# Patient Record
Sex: MALE | Race: BLACK OR AFRICAN AMERICAN | Employment: OTHER | URBAN - METROPOLITAN AREA
[De-identification: names, ages, dates, MRNs, and addresses within clinical notes are randomized per-mention and may not be internally consistent; named-entity substitution may affect disease eponyms.]

---

## 2017-01-05 ENCOUNTER — OFFICE VISIT (OUTPATIENT)
Dept: CARDIOLOGY CLINIC | Age: 62
End: 2017-01-05

## 2017-01-05 VITALS
SYSTOLIC BLOOD PRESSURE: 138 MMHG | HEART RATE: 68 BPM | BODY MASS INDEX: 30.34 KG/M2 | DIASTOLIC BLOOD PRESSURE: 92 MMHG | WEIGHT: 224 LBS | HEIGHT: 72 IN

## 2017-01-05 DIAGNOSIS — Z98.61 HISTORY OF PTCA: Primary | ICD-10-CM

## 2017-01-05 PROCEDURE — 99214 OFFICE O/P EST MOD 30 MIN: CPT | Performed by: INTERNAL MEDICINE

## 2017-01-05 RX ORDER — CLOPIDOGREL BISULFATE 75 MG/1
75 TABLET ORAL DAILY
COMMUNITY
End: 2017-08-07 | Stop reason: SDUPTHER

## 2017-01-05 RX ORDER — METOPROLOL SUCCINATE 25 MG/1
25 TABLET, EXTENDED RELEASE ORAL 2 TIMES DAILY
Status: ON HOLD | COMMUNITY
End: 2018-02-07 | Stop reason: CLARIF

## 2017-01-05 RX ORDER — ATORVASTATIN CALCIUM 80 MG/1
80 TABLET, FILM COATED ORAL DAILY
COMMUNITY
End: 2017-07-26 | Stop reason: SDUPTHER

## 2017-07-26 RX ORDER — ATORVASTATIN CALCIUM 80 MG/1
80 TABLET, FILM COATED ORAL DAILY
Qty: 90 TABLET | Refills: 3 | Status: SHIPPED | OUTPATIENT
Start: 2017-07-26

## 2017-07-26 RX ORDER — ATORVASTATIN CALCIUM 80 MG/1
80 TABLET, FILM COATED ORAL DAILY
Qty: 90 TABLET | Refills: 3 | Status: SHIPPED | OUTPATIENT
Start: 2017-07-26 | End: 2017-07-26 | Stop reason: SDUPTHER

## 2017-08-07 RX ORDER — CLOPIDOGREL BISULFATE 75 MG/1
75 TABLET ORAL DAILY
Qty: 30 TABLET | Refills: 3 | Status: SHIPPED | OUTPATIENT
Start: 2017-08-07 | End: 2018-01-30 | Stop reason: SDUPTHER

## 2017-10-12 ENCOUNTER — OFFICE VISIT (OUTPATIENT)
Dept: CARDIOLOGY CLINIC | Age: 62
End: 2017-10-12

## 2017-10-12 VITALS
HEIGHT: 72 IN | HEART RATE: 68 BPM | DIASTOLIC BLOOD PRESSURE: 80 MMHG | BODY MASS INDEX: 28.44 KG/M2 | SYSTOLIC BLOOD PRESSURE: 112 MMHG | WEIGHT: 210 LBS

## 2017-10-12 DIAGNOSIS — Z98.61 HISTORY OF PTCA: Primary | ICD-10-CM

## 2017-10-12 DIAGNOSIS — I25.119 CORONARY ARTERY DISEASE INVOLVING NATIVE CORONARY ARTERY OF NATIVE HEART WITH ANGINA PECTORIS (HCC): ICD-10-CM

## 2017-10-12 PROCEDURE — G8598 ASA/ANTIPLAT THER USED: HCPCS | Performed by: INTERNAL MEDICINE

## 2017-10-12 PROCEDURE — 99214 OFFICE O/P EST MOD 30 MIN: CPT | Performed by: INTERNAL MEDICINE

## 2017-10-12 PROCEDURE — 3017F COLORECTAL CA SCREEN DOC REV: CPT | Performed by: INTERNAL MEDICINE

## 2017-10-12 PROCEDURE — 1036F TOBACCO NON-USER: CPT | Performed by: INTERNAL MEDICINE

## 2017-10-12 PROCEDURE — G8484 FLU IMMUNIZE NO ADMIN: HCPCS | Performed by: INTERNAL MEDICINE

## 2017-10-12 PROCEDURE — G8427 DOCREV CUR MEDS BY ELIG CLIN: HCPCS | Performed by: INTERNAL MEDICINE

## 2017-10-12 PROCEDURE — G8419 CALC BMI OUT NRM PARAM NOF/U: HCPCS | Performed by: INTERNAL MEDICINE

## 2017-10-12 NOTE — PATIENT INSTRUCTIONS
Please remember to bring all medication bottles or a medication list with you to your appointment. If you have any questions, please call our office at [phone].

## 2017-10-12 NOTE — PROGRESS NOTES
allergies     H/O Doppler ultrasound 07/11/2016    carotid - normal    HTN (hypertension)     Hyperlipidemia     IBS (irritable bowel syndrome)     Mental disability     OCD (obsessive compulsive disorder)     S/P PTCA (percutaneous transluminal coronary angioplasty) 06/16/2016    Cx OM stent    Seizures ([address]      Past Surgical History:   Procedure Laterality Date    JOINT REPLACEMENT      hip replacement left    PTCA  06/16/2016    Cx OM stent     History reviewed. No pertinent family history. Social History   Substance Use Topics    Smoking status: Never Smoker    Smokeless tobacco: Never Used    Alcohol use No        Review of Systems:   All 14 systems reviewed, all are negative   Objective:      Physical Exam:  /80   Pulse 68   Ht 6' (1.829 m)   Wt 210 lb (95.3 kg)   BMI 28.48 kg/m²   Wt Readings from Last 3 Encounters:   10/12/17 210 lb (95.3 kg)   01/05/17 224 lb (101.6 kg)   10/14/16 215 lb (97.5 kg)     Body mass index is 28.48 kg/m². GENERAL - Alert, oriented, pleasant, in no apparent distress. Head unremarkable  Eyes  Not injected conjunctiva  ENT  normal mucosa  Neck - Supple. No jugular venous distention noted. No carotid bruits. Cardiovascular  Normal S1 and S2 without obvious murmur or gallop. Extremities - No cyanosis, clubbing, or significant edema. Pulmonary  No respiratory distress. No wheezes or rales. Pulses: Bilateral radial and pedal pulses normal  Abdomen  no tenderness  Musculoskeletal  normal strength  Neurologic    There are  no gross focal neurologic abnormalities.   Skin-  No rash  Affect; normal mood    DATA:  No results found for: CKTOTAL, CKMB, CKMBINDEX, TROPONINI  BNP:  No results found for: BNP  PT/INR:  No results found for: PTINR  Lab Results   Component Value Date    LABA1C 6.3 06/17/2016     Lab Results   Component Value Date    CHOL 181 04/04/2014    TRIG 103 04/04/2014    HDL 44 (L) 04/04/2014    LDLCALC 116 (H) 04/04/2014 LDLDIRECT 166 (H) 02/28/2011     Lab Results   Component Value Date    ALT 23 07/10/2016    AST 19 07/10/2016     TSH:  No results found for: TSH      QUALITY MEASURES:  CAD:  Yes   CHOL LOWERING:  Yes- if No Why  ANTIPLATELET:  Yes - if No why  BETA BLOCKER    yes,   IF  NO WHY  SMOKING HISTORY no COUNSELLED no  ATRIAL FIBRILLATIONno ANTICOAG: no    Assessment/ Plan:     Patient seen , interviewed and examined                 -     CORONARY ARTERY DISEASE: Patient  currently as per anginal classification has   No symptoms           - changes in  treatment:   no  All CAD tests available in records reviewed. -  LIPID MANAGEMENT:  Available lipid  lab data reviewed  and patient was given dietary advice. NCEP- ATP III guidelines reviewed with patient. -   Changes  in medicines made: No            -  Hypertension: Patients blood pressure is stable. Patient is advised about low sodium diet. Present medical regimen will not be changed.

## 2018-01-24 ENCOUNTER — HOSPITAL ENCOUNTER (OUTPATIENT)
Dept: GENERAL RADIOLOGY | Age: 63
Discharge: HOME | End: 2018-01-24
Attending: PSYCHIATRY & NEUROLOGY | Admitting: PSYCHIATRY & NEUROLOGY

## 2018-01-24 LAB
BACTERIA: ABNORMAL /HPF
BILIRUBIN URINE: NEGATIVE MG/DL
BLOOD, URINE: NEGATIVE
CLARITY: CLEAR
COLOR: ABNORMAL
GLUCOSE, URINE: NEGATIVE MG/DL
KETONES, URINE: NEGATIVE MG/DL
LEUKOCYTE ESTERASE, URINE: NEGATIVE
MUCUS: ABNORMAL HPF
NITRITE URINE, QUANTITATIVE: NEGATIVE
PH, URINE: 5 (ref 5–8)
PROTEIN UA: NEGATIVE MG/DL
RBC URINE: <1 /HPF (ref 0–3)
SPECIFIC GRAVITY UA: 1.01 (ref 1–1.03)
SQUAMOUS EPITHELIAL: <1 /HPF
TRICHOMONAS: ABNORMAL /HPF
UROBILINOGEN, URINE: NORMAL MG/DL (ref 0.2–1)
WBC UA: 1 /HPF (ref 0–2)

## 2018-01-26 LAB
CULTURE: NORMAL
REPORT STATUS: NORMAL
REQUEST PROBLEM: NORMAL
SPECIMEN: NORMAL
TOTAL COLONY COUNT: NORMAL

## 2018-01-31 RX ORDER — CLOPIDOGREL BISULFATE 75 MG/1
75 TABLET ORAL DAILY
Qty: 30 TABLET | Refills: 6 | Status: ON HOLD | OUTPATIENT
Start: 2018-01-31 | End: 2018-03-05 | Stop reason: HOSPADM

## 2018-02-04 PROBLEM — R27.0 ATAXIA: Status: ACTIVE | Noted: 2018-02-04

## 2018-02-06 PROBLEM — I69.90 LATE EFFECTS OF CVA (CEREBROVASCULAR ACCIDENT): Status: ACTIVE | Noted: 2018-02-06

## 2018-02-07 PROBLEM — K59.04 CHRONIC IDIOPATHIC CONSTIPATION: Status: ACTIVE | Noted: 2018-02-07

## 2018-02-07 PROBLEM — R26.9 GAIT DISTURBANCE: Status: ACTIVE | Noted: 2018-02-07

## 2018-02-07 PROBLEM — I69.90 LATE EFFECTS OF CVA (CEREBROVASCULAR ACCIDENT): Status: ACTIVE | Noted: 2018-02-07

## 2018-02-07 PROBLEM — G40.909 SEIZURE DISORDER (HCC): Status: ACTIVE | Noted: 2018-02-07

## 2018-02-20 PROBLEM — R47.1 DYSARTHRIA: Status: ACTIVE | Noted: 2018-02-20

## 2018-02-21 PROBLEM — G62.1 ALCOHOL-INDUCED POLYNEUROPATHY (HCC): Status: ACTIVE | Noted: 2018-02-21

## 2018-03-02 PROBLEM — R27.8 DYSMETRIA: Status: ACTIVE | Noted: 2018-03-02

## 2018-03-02 PROBLEM — E11.649 HYPOGLYCEMIA ASSOCIATED WITH DIABETES (HCC): Status: ACTIVE | Noted: 2018-03-02

## 2018-03-02 PROBLEM — G93.41 ACUTE METABOLIC ENCEPHALOPATHY: Status: ACTIVE | Noted: 2018-02-20

## 2018-07-17 ENCOUNTER — OFFICE VISIT (OUTPATIENT)
Dept: CARDIOLOGY CLINIC | Age: 63
End: 2018-07-17

## 2018-07-17 VITALS
WEIGHT: 215 LBS | HEIGHT: 72 IN | BODY MASS INDEX: 29.12 KG/M2 | HEART RATE: 60 BPM | SYSTOLIC BLOOD PRESSURE: 118 MMHG | DIASTOLIC BLOOD PRESSURE: 76 MMHG

## 2018-07-17 DIAGNOSIS — I10 ESSENTIAL HYPERTENSION: ICD-10-CM

## 2018-07-17 DIAGNOSIS — E78.5 DYSLIPIDEMIA: ICD-10-CM

## 2018-07-17 DIAGNOSIS — I25.110 CORONARY ARTERY DISEASE INVOLVING NATIVE CORONARY ARTERY OF NATIVE HEART WITH UNSTABLE ANGINA PECTORIS (HCC): Primary | ICD-10-CM

## 2018-07-17 PROCEDURE — 99213 OFFICE O/P EST LOW 20 MIN: CPT | Performed by: NURSE PRACTITIONER

## 2018-07-17 NOTE — PROGRESS NOTES
 PTCA  06/16/2016    Cx OM stent     Family History   Problem Relation Age of Onset   [facility] Cancer Mother     No Known Problems Father         strange to father    Cancer Sister      Social History   Substance Use Topics    Smoking status: Never Smoker    Smokeless tobacco: Never Used    Alcohol use No        Review of Systems:   · Constitutional: No Fever or Weight Loss   · Eyes: No Decreased Vision  · ENT: No Headaches, Hearing Loss or Vertigo  · Cardiovascular: as per note above   · Respiratory: No cough or wheezing and as per note above. · Gastrointestinal: No abdominal pain, appetite loss, blood in stools, constipation, diarrhea or heartburn  · Genitourinary: No dysuria, trouble voiding, or hematuria  · Musculoskeletal:  None  · Integumentary: No rash or pruritis  · Neurological: No TIA or stroke symptoms  · Psychiatric: No anxiety or depression  · Endocrine: No malaise, fatigue or temperature intolerance  · Hematologic/Lymphatic: No bleeding problems, blood clots or swollen lymph nodes  · Allergic/Immunologic: No nasal congestion or hives    Objective:       /76   Pulse 60   Ht 6' (1.829 m)   Wt 215 lb (97.5 kg)   BMI 29.16 kg/m²   Wt Readings from Last 3 Encounters:   07/17/18 215 lb (97.5 kg)   03/05/18 200 lb 14.4 oz (91.1 kg)   02/21/18 200 lb (90.7 kg)       General Appearance:  No distress, conversant  Constitutional:  Well developed, Well nourished, No acute distress, Non-toxic appearance. HENT:  Normocephalic, Atraumatic, Bilateral external ears normal, Oropharynx moist, No oral exudates, Nose normal. Neck- Normal range of motion, No tenderness, Supple, No stridor,no apical-carotid delay  Eyes:  PERRL, EOMI, Conjunctiva normal, No discharge. Respiratory:  Normal breath sounds, No respiratory distress, No wheezing, No chest tenderness. ,no use of accessory muscles, NO crackles  Cardiovascular: (PMI) apex non displaced,no lifts no thrills, S1 S2  No murmur   GI:  Bowel sounds normal,

## 2019-03-06 ENCOUNTER — HOSPITAL ENCOUNTER (OUTPATIENT)
Age: 64
Discharge: HOME | End: 2019-03-06
Payer: MEDICARE

## 2019-03-06 ENCOUNTER — HOSPITAL ENCOUNTER (OUTPATIENT)
Dept: GENERAL RADIOLOGY | Age: 64
Discharge: HOME | End: 2019-03-06
Payer: MEDICARE

## 2019-03-06 DIAGNOSIS — M25.511 RIGHT SHOULDER PAIN, UNSPECIFIED CHRONICITY: ICD-10-CM

## 2019-03-06 PROCEDURE — 73030 X-RAY EXAM OF SHOULDER: CPT

## 2019-03-21 ENCOUNTER — HOSPITAL ENCOUNTER (OUTPATIENT)
Age: 64
Setting detail: SPECIMEN
Discharge: HOME | End: 2019-03-21
Payer: MEDICARE

## 2019-03-21 LAB
ALBUMIN SERPL-MCNC: 4.8 GM/DL (ref 3.4–5)
ALP BLD-CCNC: 39 IU/L (ref 40–128)
ALT SERPL-CCNC: 51 U/L (ref 10–40)
ANION GAP SERPL CALCULATED.3IONS-SCNC: 11 MMOL/L (ref 4–16)
AST SERPL-CCNC: 33 IU/L (ref 15–37)
BILIRUB SERPL-MCNC: 0.4 MG/DL (ref 0–1)
BUN BLDV-MCNC: 10 MG/DL (ref 6–23)
CALCIUM SERPL-MCNC: 9.7 MG/DL (ref 8.3–10.6)
CHLORIDE BLD-SCNC: 99 MMOL/L (ref 99–110)
CHOLESTEROL: 110 MG/DL
CO2: 29 MMOL/L (ref 21–32)
CREAT SERPL-MCNC: 0.9 MG/DL (ref 0.9–1.3)
CREATININE URINE: 155.2 MG/DL (ref 39–259)
ESTIMATED AVERAGE GLUCOSE: 243 MG/DL
GFR AFRICAN AMERICAN: >60 ML/MIN/1.73M2
GFR NON-AFRICAN AMERICAN: >60 ML/MIN/1.73M2
GLUCOSE BLD-MCNC: 196 MG/DL (ref 70–99)
HBA1C MFR BLD: 10.1 % (ref 4.2–6.3)
HDLC SERPL-MCNC: 43 MG/DL
LDL CHOLESTEROL DIRECT: 60 MG/DL
MICROALBUMIN/CREAT 24H UR: 3.8 MG/DL
MICROALBUMIN/CREAT UR-RTO: 24.5 MG/G CREAT (ref 0–30)
POTASSIUM SERPL-SCNC: 4.9 MMOL/L (ref 3.5–5.1)
SODIUM BLD-SCNC: 139 MMOL/L (ref 135–145)
TOTAL PROTEIN: 6.5 GM/DL (ref 6.4–8.2)
TRIGL SERPL-MCNC: 111 MG/DL

## 2019-03-21 PROCEDURE — 83036 HEMOGLOBIN GLYCOSYLATED A1C: CPT

## 2019-03-21 PROCEDURE — 80053 COMPREHEN METABOLIC PANEL: CPT

## 2019-03-21 PROCEDURE — 80061 LIPID PANEL: CPT

## 2019-03-21 PROCEDURE — 82043 UR ALBUMIN QUANTITATIVE: CPT

## 2019-03-21 PROCEDURE — 83721 ASSAY OF BLOOD LIPOPROTEIN: CPT

## 2019-03-21 PROCEDURE — 82570 ASSAY OF URINE CREATININE: CPT

## 2019-05-09 ENCOUNTER — OFFICE VISIT (OUTPATIENT)
Dept: CARDIOLOGY CLINIC | Age: 64
End: 2019-05-09
Payer: MEDICARE

## 2019-05-09 VITALS
HEART RATE: 90 BPM | WEIGHT: 205.2 LBS | SYSTOLIC BLOOD PRESSURE: 124 MMHG | HEIGHT: 75 IN | DIASTOLIC BLOOD PRESSURE: 82 MMHG | BODY MASS INDEX: 25.51 KG/M2

## 2019-05-09 DIAGNOSIS — I10 ESSENTIAL HYPERTENSION: ICD-10-CM

## 2019-05-09 DIAGNOSIS — I25.110 CORONARY ARTERY DISEASE INVOLVING NATIVE CORONARY ARTERY OF NATIVE HEART WITH UNSTABLE ANGINA PECTORIS (HCC): Primary | ICD-10-CM

## 2019-05-09 DIAGNOSIS — E78.5 DYSLIPIDEMIA: ICD-10-CM

## 2019-05-09 PROCEDURE — 99213 OFFICE O/P EST LOW 20 MIN: CPT | Performed by: NURSE PRACTITIONER

## 2019-05-09 PROCEDURE — 3017F COLORECTAL CA SCREEN DOC REV: CPT | Performed by: NURSE PRACTITIONER

## 2019-05-09 PROCEDURE — G8419 CALC BMI OUT NRM PARAM NOF/U: HCPCS | Performed by: NURSE PRACTITIONER

## 2019-05-09 PROCEDURE — G8598 ASA/ANTIPLAT THER USED: HCPCS | Performed by: NURSE PRACTITIONER

## 2019-05-09 PROCEDURE — G8427 DOCREV CUR MEDS BY ELIG CLIN: HCPCS | Performed by: NURSE PRACTITIONER

## 2019-05-09 PROCEDURE — 1036F TOBACCO NON-USER: CPT | Performed by: NURSE PRACTITIONER

## 2019-05-09 RX ORDER — LACOSAMIDE 200 MG/1
200 TABLET ORAL
COMMUNITY
End: 2019-06-28

## 2019-05-09 RX ORDER — LOPERAMIDE HYDROCHLORIDE 2 MG/1
2 CAPSULE ORAL EVERY 6 HOURS PRN
Status: ON HOLD | COMMUNITY
End: 2020-02-25 | Stop reason: HOSPADM

## 2019-05-09 NOTE — PROGRESS NOTES
daily      atorvastatin (LIPITOR) 80 MG tablet Take 1 tablet by mouth daily 90 tablet 3    OLANZapine (ZYPREXA) 7.5 MG tablet Take 7.5 mg by mouth nightly      ranitidine (ZANTAC) 150 MG tablet Take 150 mg by mouth nightly      lacosamide (VIMPAT) 200 MG tablet Take 1 tablet by mouth 2 times daily for 60 days. 60 tablet 1     No current facility-administered medications for this visit. Allergies: Patient has no known allergies.   Past Medical History:   Diagnosis Date    Alcohol-induced polyneuropathy ([address] 2/21/2018    CAD (coronary artery disease)     Dementia     DM2 (diabetes mellitus, type 2) (HCC) unknown    Environmental allergies     H/O Doppler ultrasound 07/11/2016    carotid - normal    HTN (hypertension)     Hyperlipidemia     IBS (irritable bowel syndrome)     Mental disability     OCD (obsessive compulsive disorder)     S/P PTCA (percutaneous transluminal coronary angioplasty) 06/16/2016    Cx OM stent    Seizures (HCC)      Past Surgical History:   Procedure Laterality Date    CARDIAC SURGERY      JOINT REPLACEMENT      hip replacement left    PTCA  06/16/2016    Cx OM stent     Family History   Problem Relation Age of Onset    Cancer Mother     No Known Problems Father         strange to father    Cancer Sister      Social History     Tobacco Use    Smoking status: Never Smoker    Smokeless tobacco: Never Used   Substance Use Topics    Alcohol use: No        Review of Systems - History obtained from the patient  General: negative for - fatigue, malaise, night sweats, weight gain  Psychological: negative for - anxiety, depression, sleep disturbances  Ophthalmic: negative for - blurry vision, loss of vision  ENT: negative for - headaches, vertigo, visual changes  Hematological and Lymphatic: negative for - bleeding problems, blood clots, bruising, fatigue or pallor  Endocrine: negative for - malaise/lethargy, palpitations, unexpected weight changes  Respiratory: negative Component Value Date    CHOL 110 03/21/2019    TRIG 111 03/21/2019    HDL 43 03/21/2019    LDLCALC 116 (H) 04/04/2014    LDLDIRECT 60 03/21/2019     Lab Results   Component Value Date    ALT 51 (H) 03/21/2019    AST 33 03/21/2019     TSH:  No results found for: TSH      Assessment/ Plan:     CAD     Stable    continue with medications    Last stress test 7/8/2016 stent LAD    HTN     Controlled   To cont same medications   advised low salt diet    Last echo 2/6/2018 showed Left ventricular function is normal , EF is estimated at 50 %.  Mild concentric left ventricular hypertrophy.   Right ventricular systolic pressure of 19 mm Hg.   No significant valvular regurgitation noted.   No evidence of any pericardial effusion. Hyperlipidemia  Lipid panel reviewed  Patient is at goal   Patient is on a Statin      Patient seen, interviewed and examined. Testing was reviewed. Patient is encouraged to exercise even a brisk walk for 30 minutes at least 3 to 4 times a week. Lifestyle and risk factor modificatons discussed. Various goals are discussed and questions answered. Continue current medications. Appropriate prescriptions are addressed. Questions answered and patient verbalizes understanding. Call for any problems, questions, or concerns.     Pt is to follow up in 9 months for Cardiac management    Electronically signed by JOSE Hernandez CNP on 5/9/2019 at 9:55 AM

## 2019-05-20 ENCOUNTER — OFFICE VISIT (OUTPATIENT)
Dept: FAMILY MEDICINE CLINIC | Age: 64
End: 2019-05-20
Payer: MEDICARE

## 2019-05-20 ENCOUNTER — TELEPHONE (OUTPATIENT)
Dept: FAMILY MEDICINE CLINIC | Age: 64
End: 2019-05-20

## 2019-05-20 VITALS
WEIGHT: 201.2 LBS | HEART RATE: 90 BPM | DIASTOLIC BLOOD PRESSURE: 60 MMHG | OXYGEN SATURATION: 97 % | SYSTOLIC BLOOD PRESSURE: 102 MMHG | BODY MASS INDEX: 25.15 KG/M2

## 2019-05-20 DIAGNOSIS — H61.21 RIGHT EAR IMPACTED CERUMEN: Primary | ICD-10-CM

## 2019-05-20 PROCEDURE — G8419 CALC BMI OUT NRM PARAM NOF/U: HCPCS | Performed by: NURSE PRACTITIONER

## 2019-05-20 PROCEDURE — G8598 ASA/ANTIPLAT THER USED: HCPCS | Performed by: NURSE PRACTITIONER

## 2019-05-20 PROCEDURE — G8427 DOCREV CUR MEDS BY ELIG CLIN: HCPCS | Performed by: NURSE PRACTITIONER

## 2019-05-20 PROCEDURE — 3017F COLORECTAL CA SCREEN DOC REV: CPT | Performed by: NURSE PRACTITIONER

## 2019-05-20 PROCEDURE — 99212 OFFICE O/P EST SF 10 MIN: CPT | Performed by: NURSE PRACTITIONER

## 2019-05-20 PROCEDURE — 1036F TOBACCO NON-USER: CPT | Performed by: NURSE PRACTITIONER

## 2019-05-20 RX ORDER — IBUPROFEN 200 MG
200 TABLET ORAL EVERY 6 HOURS PRN
Status: ON HOLD | COMMUNITY
End: 2020-01-31 | Stop reason: HOSPADM

## 2019-05-20 ASSESSMENT — PATIENT HEALTH QUESTIONNAIRE - PHQ9: DEPRESSION UNABLE TO ASSESS: FUNCTIONAL CAPACITY MOTIVATION LIMITS ACCURACY

## 2019-05-20 ASSESSMENT — ENCOUNTER SYMPTOMS
SORE THROAT: 0
ABDOMINAL PAIN: 0
COUGH: 0
RHINORRHEA: 0
VOMITING: 0
DIARRHEA: 0

## 2019-05-20 NOTE — PROGRESS NOTES
Marc San   60 y.o.  male  [id number]      Chief Complaint   Patient presents with    Otalgia     Right one, patient states it also needs irrigated. Subjective:  64 y.o.male is here for a follow up. He has the following chronic/acute medical problems:  Patient Active Problem List   Diagnosis    Obsessive-compulsive disorder    IBS (irritable bowel syndrome)    Seizures ([address]    Mental disability    Uncontrolled type 2 diabetes mellitus with diabetic polyneuropathy, without long-term current use of insulin ([address]    Coronary artery disease involving native coronary artery of native heart with unstable angina pectoris ([address]    Essential hypertension    Dyslipidemia    Ataxia    Late effects of CVA (cerebrovascular accident)    Chronic idiopathic constipation    Gait disturbance    Late effects of CVA (cerebrovascular accident)    Seizure disorder ([address]    Acute metabolic encephalopathy    Alcohol-induced polyneuropathy ([address]    Peripheral polyneuropathy    Hypoglycemia associated with diabetes ([address]    Dysmetria       Otalgia    There is pain in the right ear. This is a new problem. The current episode started today. The problem occurs constantly. The problem has been unchanged. There has been no fever. The pain is moderate. Pertinent negatives include no abdominal pain, coughing, diarrhea, ear discharge, headaches, hearing loss, neck pain, rash, rhinorrhea, sore throat or vomiting. He has tried nothing for the symptoms. Review of Systems   Constitutional: Negative for appetite change, chills, fatigue and fever. HENT: Positive for ear pain. Negative for congestion, ear discharge, hearing loss, postnasal drip, rhinorrhea, sinus pressure, sinus pain, sneezing and sore throat. Respiratory: Negative for cough, chest tightness, shortness of breath and wheezing. Cardiovascular: Negative for chest pain and palpitations.    Gastrointestinal: Negative for abdominal pain, diarrhea, nausea family,surgical history reviewed today. Objective:  /60   Pulse 90   Wt 201 lb 3.2 oz (91.3 kg)   SpO2 97%   BMI 25.15 kg/m²   BP Readings from Last 3 Encounters:   05/20/19 102/60   05/09/19 124/82   07/17/18 118/76     Wt Readings from Last 3 Encounters:   05/20/19 201 lb 3.2 oz (91.3 kg)   05/09/19 205 lb 3.2 oz (93.1 kg)   07/17/18 215 lb (97.5 kg)         Physical Exam   Constitutional: He is oriented to person, place, and time. He appears well-developed and well-nourished. HENT:   Head: Normocephalic. Left Ear: Tympanic membrane, external ear and ear canal normal.   Cerumen impaction noted in left ear. Neck: Neck supple. Cardiovascular: Normal rate, regular rhythm and normal heart sounds. Pulmonary/Chest: Effort normal and breath sounds normal.   Neurological: He is alert and oriented to person, place, and time. Skin: Skin is warm and dry. Psychiatric: He has a normal mood and affect.  His behavior is normal.       Lab Results   Component Value Date    WBC 4.4 03/03/2018    HGB 15.5 03/03/2018    HCT 46.6 03/03/2018    MCV 91.9 03/03/2018     03/03/2018     Lab Results   Component Value Date     03/21/2019    K 4.9 03/21/2019    CL 99 03/21/2019    CO2 29 03/21/2019    BUN 10 03/21/2019    CREATININE 0.9 03/21/2019    GLUCOSE 196 (H) 03/21/2019    CALCIUM 9.7 03/21/2019    PROT 6.5 03/21/2019    LABALBU 4.8 03/21/2019    BILITOT 0.4 03/21/2019    ALKPHOS 39 (L) 03/21/2019    AST 33 03/21/2019    ALT 51 (H) 03/21/2019    LABGLOM >60 03/21/2019    GFRAA >60 03/21/2019     Lab Results   Component Value Date    CHOL 110 03/21/2019    CHOL 105 02/21/2018    CHOL 94 02/05/2018     Lab Results   Component Value Date    TRIG 111 03/21/2019    TRIG 89 02/21/2018    TRIG 83 02/05/2018     Lab Results   Component Value Date    HDL 43 03/21/2019    HDL 41 02/21/2018    HDL 40 (L) 02/05/2018     Lab Results   Component Value Date    LDLCALC 116 (H) 04/04/2014    LDLCALC 124 (H) 02/18/2012     Lab Results   Component Value Date    LABA1C 10.1 (H) 03/21/2019     Lab Results   Component Value Date    TSHHS 4.900 (H) 02/20/2018         ASSESSMENT/PLAN:      1. Right ear impacted cerumen  Right ear irrigated. Free of cerumen. Tolerated well. Will prescribe cortisporin to be used in the next three days to reduce the swelling and redness of the right ear. - EAR CERUMEN REMOVAL  - neomycin-polymyxin-hydrocortisone (CORTISPORIN) 3.5-13194-2 otic solution; Place 3 drops in ear(s) 3 times daily for 3 days  Dispense: 1 each; Refill: 0            Medications Discontinued During This Encounter   Medication Reason    hydrocortisone 2.5 % cream Therapy completed           Care discussed with patient. Questions answered. Patient verbalizes understanding and agrees with plan. After visit summary provided. Advised to call for any problems, questions, or concerns. Return if symptoms worsen or fail to improve.                                              Signed:  JOSE Grimm CNP  05/21/19  7:20 PM

## 2019-05-21 ENCOUNTER — TELEPHONE (OUTPATIENT)
Dept: FAMILY MEDICINE CLINIC | Age: 64
End: 2019-05-21

## 2019-05-21 ASSESSMENT — ENCOUNTER SYMPTOMS
SINUS PAIN: 0
NAUSEA: 0
CHEST TIGHTNESS: 0
WHEEZING: 0
SINUS PRESSURE: 0
SHORTNESS OF BREATH: 0

## 2019-05-21 NOTE — TELEPHONE ENCOUNTER
Left VM for Jose Noriega at [phone] from [facility] that signed form is ready for p/u. It's in the folder at the . Also scanned to media mgr.

## 2019-06-05 DIAGNOSIS — N39.41 URGE INCONTINENCE OF URINE: ICD-10-CM

## 2019-06-05 DIAGNOSIS — F70 MILD MENTAL HANDICAP: ICD-10-CM

## 2019-06-05 DIAGNOSIS — R42 DIZZINESS: ICD-10-CM

## 2019-06-05 DIAGNOSIS — M87.00 AVASCULAR NECROSIS OF BONE (HCC): ICD-10-CM

## 2019-06-05 DIAGNOSIS — R29.6 RECURRENT FALLS: ICD-10-CM

## 2019-06-05 RX ORDER — LORATADINE 10 MG/1
10 TABLET ORAL
COMMUNITY
End: 2019-06-28

## 2019-06-05 RX ORDER — MONTELUKAST SODIUM 10 MG/1
10 TABLET ORAL
COMMUNITY
End: 2019-06-28

## 2019-06-05 RX ORDER — OXYBUTYNIN CHLORIDE 5 MG/1
5 TABLET ORAL 3 TIMES DAILY
COMMUNITY
End: 2019-06-28

## 2019-06-05 RX ORDER — MECLIZINE HCL 12.5 MG/1
12.5 TABLET ORAL 2 TIMES DAILY
COMMUNITY
End: 2019-12-17

## 2019-06-24 ENCOUNTER — TELEPHONE (OUTPATIENT)
Dept: FAMILY MEDICINE CLINIC | Age: 64
End: 2019-06-24

## 2019-06-26 ENCOUNTER — TELEPHONE (OUTPATIENT)
Dept: FAMILY MEDICINE CLINIC | Age: 64
End: 2019-06-26

## 2019-06-26 NOTE — TELEPHONE ENCOUNTER
Called and spoke with home manager and informed her per Sussy Hong I put Coleman Montgomery in tomorrow 6/26 @ 2:15pm.

## 2019-06-26 NOTE — TELEPHONE ENCOUNTER
----- Message from Andreia Numbers sent at 6/26/2019 11:31 AM EDT -----  Contact: Raymon Robert - home manager [phone]  Kristal Armendariz missed his appt on Monday because they took him to the wrong doctor. Is there anyway they can bring him with John Numbers on Thursday?  He has an appt at 1:00

## 2019-06-27 ENCOUNTER — TELEPHONE (OUTPATIENT)
Dept: FAMILY MEDICINE CLINIC | Age: 64
End: 2019-06-27

## 2019-06-27 NOTE — TELEPHONE ENCOUNTER
Spoke with home manager zohra . Re: med not rx'd by dr Gladys Bowen.  Zohra stated they have a new pharmacy & she will take care of this

## 2019-06-27 NOTE — TELEPHONE ENCOUNTER
----- Message from Akila Starkey sent at 6/27/2019  1:51 PM EDT -----  Contact: [facility]  VIMPAT 200 MG 1 TAB BID  [facility]  [phone]

## 2019-06-28 ENCOUNTER — OFFICE VISIT (OUTPATIENT)
Dept: FAMILY MEDICINE CLINIC | Age: 64
End: 2019-06-28
Payer: MEDICARE

## 2019-06-28 VITALS
HEART RATE: 84 BPM | DIASTOLIC BLOOD PRESSURE: 62 MMHG | WEIGHT: 214 LBS | HEIGHT: 71 IN | BODY MASS INDEX: 29.96 KG/M2 | SYSTOLIC BLOOD PRESSURE: 100 MMHG

## 2019-06-28 DIAGNOSIS — I10 ESSENTIAL HYPERTENSION: ICD-10-CM

## 2019-06-28 DIAGNOSIS — R63.5 WEIGHT GAIN: ICD-10-CM

## 2019-06-28 DIAGNOSIS — G31.9 CEREBRAL ATROPHY (HCC): ICD-10-CM

## 2019-06-28 DIAGNOSIS — G40.909 SEIZURE DISORDER (HCC): ICD-10-CM

## 2019-06-28 DIAGNOSIS — E11.9 TYPE 2 DIABETES MELLITUS WITHOUT COMPLICATION, WITHOUT LONG-TERM CURRENT USE OF INSULIN (HCC): ICD-10-CM

## 2019-06-28 LAB
A/G RATIO: 2.5 (ref 1.1–2.2)
ALBUMIN SERPL-MCNC: 5 G/DL (ref 3.4–5)
ALP BLD-CCNC: 39 U/L (ref 40–129)
ALT SERPL-CCNC: 53 U/L (ref 10–40)
ANION GAP SERPL CALCULATED.3IONS-SCNC: 15 MMOL/L (ref 3–16)
AST SERPL-CCNC: 41 U/L (ref 15–37)
BILIRUB SERPL-MCNC: 0.4 MG/DL (ref 0–1)
BUN BLDV-MCNC: 8 MG/DL (ref 7–20)
CALCIUM SERPL-MCNC: 9.9 MG/DL (ref 8.3–10.6)
CHLORIDE BLD-SCNC: 103 MMOL/L (ref 99–110)
CO2: 23 MMOL/L (ref 21–32)
CREAT SERPL-MCNC: 0.8 MG/DL (ref 0.8–1.3)
GFR AFRICAN AMERICAN: >60
GFR NON-AFRICAN AMERICAN: >60
GLOBULIN: 2 G/DL
GLUCOSE BLD-MCNC: 147 MG/DL (ref 70–99)
POTASSIUM SERPL-SCNC: 4.3 MMOL/L (ref 3.5–5.1)
SODIUM BLD-SCNC: 141 MMOL/L (ref 136–145)
TOTAL PROTEIN: 7 G/DL (ref 6.4–8.2)

## 2019-06-28 PROCEDURE — 99214 OFFICE O/P EST MOD 30 MIN: CPT | Performed by: FAMILY MEDICINE

## 2019-06-28 ASSESSMENT — ENCOUNTER SYMPTOMS
CHEST TIGHTNESS: 0
CONSTIPATION: 0
RESPIRATORY NEGATIVE: 1
SORE THROAT: 0
RHINORRHEA: 0
EYES NEGATIVE: 1
DIARRHEA: 0
ABDOMINAL PAIN: 0
COUGH: 0
SHORTNESS OF BREATH: 0
GASTROINTESTINAL NEGATIVE: 1
SINUS PRESSURE: 0
ALLERGIC/IMMUNOLOGIC NEGATIVE: 1

## 2019-06-28 NOTE — PROGRESS NOTES
6/28/2019    Shemar Farzana    Chief Complaint   Patient presents with    3 Month Follow-Up     - no c/o       HPI  History was obtained from patient & caregiver Libby Jane. Vane Saenz is a 60 y.o. male who presents today to follow up after 3 months. Blood pressure was a little low today, he is very tired in office, actually dozed off a bit during conversation. He quickly snaps back when spoken to. Blood sugar log not brought today, caregiver forgot it but states his home readings have been 120-160. His last A1C was at 10.1 which is above goal.    Weight gain of 13 lbs in the last 3-4 months. He states he sleeps good at night time, Libby Franciscodonta says he is usually in bed by 10 pm. But he falls asleep all throughout the daytime. He does experience dizziness often. Vane Serum complain of this again. Vane Saenz complains of this at every office visit. It does not stick with him that this is a chronic illness that unfortunately will never improve. Denies any seizures recently, last one was the end of April while getting off the bus walking into the house. He was able to sit, rest and come out of it fine. Otherwise doing pretty well. Goes to [facility] daily until 3:15 pm.    He needs a new toilet seat at home, it had broken yesterday and could be hazardous. Refill of test strips sent today. REVIEW OF SYMPTOMS    Review of Systems   Constitutional: Positive for fatigue. Negative for chills and fever. HENT: Negative. Negative for rhinorrhea, sinus pressure and sore throat. Eyes: Negative. Respiratory: Negative. Negative for cough, chest tightness and shortness of breath. Cardiovascular: Negative. Gastrointestinal: Negative. Negative for abdominal pain, constipation and diarrhea. Endocrine: Negative. Genitourinary: Negative. Negative for dysuria and frequency. Musculoskeletal: Negative. Negative for myalgias. Skin: Negative. Allergic/Immunologic: Negative. Neurological: Negative. Hematological: Negative. Psychiatric/Behavioral: Negative.         PAST MEDICAL HISTORY  Past Medical History:   Diagnosis Date    Avascular necrosis of bone (HCC)     right hip    CAD (coronary artery disease)     Coronary atherosclerosis     Dementia     Dementia     Dizziness     cerebelar atrophy    DM2 (diabetes mellitus, type 2) ([facility]) unknown    Environmental allergies     Essential hypertension     H/O Doppler ultrasound 07/11/2016    carotid - normal    HTN (hypertension)     Hyperlipidemia     IBS (irritable bowel syndrome)     IBS (irritable bowel syndrome)     Mental disability     Mild mental handicap     OCD (obsessive compulsive disorder)     Recurrent falls     S/P PTCA (percutaneous transluminal coronary angioplasty) 06/16/2016    Cx OM stent    Seizure disorder ([facility])     Seizures ([address]     Urge incontinence of urine     Wrist joint pain     chronic left       FAMILY HISTORY  Family History   Problem Relation Age of Onset    Cancer Mother     No Known Problems Father         strange to father    Cancer Sister        SOCIAL HISTORY  Social History     Socioeconomic History    Marital status: Single     Spouse name: None    Number of children: None    Years of education: None    Highest education level: None   Occupational History    None   Social Needs    Financial resource strain: None    Food insecurity:     Worry: None     Inability: None    Transportation needs:     Medical: None     Non-medical: None   Tobacco Use    Smoking status: Never Smoker    Smokeless tobacco: Never Used   Substance and Sexual Activity    Alcohol use: No    Drug use: No    Sexual activity: Not Currently     Partners: Female   Lifestyle    Physical activity:     Days per week: None     Minutes per session: None    Stress: None   Relationships    Social connections:     Talks on phone: None     Gets together: None     Attends Caodaism service: None     Active member of club or organization: None     Attends meetings of clubs or organizations: None     Relationship status: None    Intimate partner violence:     Fear of current or ex partner: None     Emotionally abused: None     Physically abused: None     Forced sexual activity: None   Other Topics Concern    None   Social History Narrative    None        SURGICAL HISTORY  Past Surgical History:   Procedure Laterality Date    CARDIAC SURGERY      JOINT REPLACEMENT      hip replacement left    ORIF FEMUR DECOMPRESSION  06/2007    left     PTCA  06/16/2016    Cx OM stent       CURRENT MEDICATIONS  Current Outpatient Medications   Medication Sig Dispense Refill    meclizine (ANTIVERT) 12.5 MG tablet Take 12.5 mg by mouth 2 times daily      lamoTRIgine (LAMICTAL) 200 MG tablet Take 200 mg by mouth daily       hydrocortisone 2.5 % ointment Apply topically 2 times daily Apply topically 2 times daily.  ibuprofen (ADVIL;MOTRIN) 200 MG tablet Take 200 mg by mouth every 6 hours as needed for Pain      aspirin 81 MG tablet Take 81 mg by mouth daily      metFORMIN (GLUCOPHAGE) 500 MG tablet Take 500 mg by mouth 2 times daily (with meals)      loperamide (IMODIUM) 2 MG capsule Take 2 mg by mouth 4 times daily as needed for Diarrhea      midodrine (PROAMATINE) 10 MG tablet Take 1 tablet by mouth 2 times daily (with meals) 90 tablet 3    lacosamide (VIMPAT) 200 MG tablet Take 1 tablet by mouth 2 times daily for 60 days.  60 tablet 1    lamoTRIgine (LAMICTAL) 25 MG tablet Take 10 tablets by mouth 2 times daily (Patient taking differently: 300 mg Take 1 tab by mouth at bedtime) 30 tablet 3    dipyridamole-aspirin (AGGRENOX)  MG per extended release capsule Take 1 capsule by mouth 2 times daily 60 capsule 3    acetaminophen (TYLENOL) 500 MG tablet Take 1 tablet by mouth every 4 hours as needed for Pain or Fever 120 tablet 3    docusate sodium (COLACE) 100 MG capsule Take 200 mg by mouth daily       atorvastatin (LIPITOR) 80 MG visit. Otherwise controlled. Continue same meds. 4. Cerebral atrophy  The etiology to his chronic dizziness. Whether from cerebral palsy or prior trauma is unknown known. 5. Weight gain  Staff states it is due to overfeeding. Discussed that she work with the staff members to feed appropriately. Started weekly weights. Goal of losing 10 pounds in 3 months. Return in about 3 months (around 9/28/2019). Electronically signed by Nathaniel Daly on 6/28/2019      Scribe Authentication Statement  Mini GARZA, scribed portions of this documentation for and in the presence of Shaista Keating MD on 6/28/19 at 8:55 AM.     IShaista MD, personally performed the service described in this documentation as scribed by Mini Brand MA in my presence and it is both accurate and complete.

## 2019-06-29 LAB
ESTIMATED AVERAGE GLUCOSE: 174.3 MG/DL
HBA1C MFR BLD: 7.7 %

## 2019-07-01 ENCOUNTER — TELEPHONE (OUTPATIENT)
Dept: FAMILY MEDICINE CLINIC | Age: 64
End: 2019-07-01

## 2019-07-01 NOTE — TELEPHONE ENCOUNTER
----- Message from Myron Block MD sent at 7/1/2019  9:27 AM EDT -----  Call CSS staff. Good job although [employer] was up, he has improved his diabetes control from an A1c of 10 down to 7.7. This is still above goal.  Damon Toledo needs to do better but wanted to let you know of the improvement.

## 2019-07-01 NOTE — TELEPHONE ENCOUNTER
----- Message from Kelly Hernandes MD sent at 7/1/2019  9:27 AM EDT -----  Call CSS staff. Good job although [employer] was up, he has improved his diabetes control from an A1c of 10 down to 7.7. This is still above goal.  Kristen Gamez needs to do better but wanted to let you know of the improvement.

## 2019-07-01 NOTE — TELEPHONE ENCOUNTER
----- Message from Brooke Raymond sent at 7/1/2019  8:37 AM EDT -----  Contact: [address] [place] [address]    [phone]  FOR DR. BACK-    REPORTNG :      PATIENT HAD A 30 SECOND SEIZURE YESTERDAY MORNING

## 2019-07-03 ENCOUNTER — TELEPHONE (OUTPATIENT)
Dept: FAMILY MEDICINE CLINIC | Age: 64
End: 2019-07-03

## 2019-07-03 NOTE — TELEPHONE ENCOUNTER
----- Message from Janene Yarbrough sent at 7/2/2019  4:10 PM EDT -----  Contact: JACQUELIN ALVAREZING - [place] [facility]      [phone]  [employer]ANCE [employer] REQUIRES THE OFFICE VISIT NOTES REGARDING THE RAISED TOILET SEAT. PLEASE FAX TO:  [phone]. ([employer])    PLEASE CALL HER WHEN THIS IS DONE.

## 2019-07-24 ENCOUNTER — TELEPHONE (OUTPATIENT)
Dept: FAMILY MEDICINE CLINIC | Age: 64
End: 2019-07-24

## 2019-08-07 ENCOUNTER — TELEPHONE (OUTPATIENT)
Dept: FAMILY MEDICINE CLINIC | Age: 64
End: 2019-08-07

## 2019-08-07 NOTE — TELEPHONE ENCOUNTER
----- Message from Celestino Disla sent at 8/6/2019  8:20 AM EDT -----  Contact: heydi andersony [phone]  Metformin 500 mg 2 bid needs to be sent to [facility] please do no send to [facility]

## 2019-08-15 DIAGNOSIS — G40.909 SEIZURE DISORDER (HCC): Primary | ICD-10-CM

## 2019-08-15 RX ORDER — LACOSAMIDE 200 MG/1
200 TABLET ORAL 2 TIMES DAILY
Qty: 60 TABLET | Refills: 2 | Status: SHIPPED | OUTPATIENT
Start: 2019-08-15 | End: 2019-08-28 | Stop reason: SDUPTHER

## 2019-08-28 ENCOUNTER — TELEPHONE (OUTPATIENT)
Dept: FAMILY MEDICINE CLINIC | Age: 64
End: 2019-08-28

## 2019-08-28 DIAGNOSIS — G40.909 SEIZURE DISORDER (HCC): ICD-10-CM

## 2019-08-28 RX ORDER — LACOSAMIDE 200 MG/1
200 TABLET ORAL 2 TIMES DAILY
Qty: 62 TABLET | Refills: 2 | Status: SHIPPED | OUTPATIENT
Start: 2019-08-28 | End: 2020-01-09

## 2019-08-28 NOTE — TELEPHONE ENCOUNTER
----- Message from Jeaneth Chan sent at 8/27/2019  1:15 PM EDT -----  Contact: Forrest Jones - [facility]     [phone]  LAST MONTH, PATIENT  STARTED RECEIVING PRESCRIPTIONS FROM A NEW PHARMACY---PATIENT HAS BEEN SHORTED MEDICINE AMOUNT---THE SCRIPT WAS  WRITTEN FOR 30 DAYS, SO IF A MONTH HAS 31 DAYS IN IT, THE PRESCRIPTION RUNS OUT. PLEASE HELP STRAIGHTEN THIS OUT WITH PHARMACY.     MED REFILL:    VIMPAT 200 MG    PHARMACY:  [facility]                          [phone]

## 2019-08-30 ENCOUNTER — TELEPHONE (OUTPATIENT)
Dept: SLEEP CENTER | Age: 64
End: 2019-08-30

## 2019-09-03 ENCOUNTER — HOSPITAL ENCOUNTER (OUTPATIENT)
Dept: CT IMAGING | Age: 64
Discharge: HOME | End: 2019-09-03
Payer: MEDICARE

## 2019-09-03 DIAGNOSIS — F70 MILD INTELLECTUAL DISABILITIES: ICD-10-CM

## 2019-09-03 DIAGNOSIS — G31.89 INFANTILE NEUROAXONAL DYSTROPHY (HCC): ICD-10-CM

## 2019-09-03 PROCEDURE — 70450 CT HEAD/BRAIN W/O DYE: CPT

## 2019-09-05 ENCOUNTER — OFFICE VISIT (OUTPATIENT)
Dept: FAMILY MEDICINE CLINIC | Age: 64
End: 2019-09-05
Payer: MEDICARE

## 2019-09-05 VITALS
DIASTOLIC BLOOD PRESSURE: 68 MMHG | HEART RATE: 95 BPM | WEIGHT: 218.8 LBS | BODY MASS INDEX: 30.63 KG/M2 | HEIGHT: 71 IN | OXYGEN SATURATION: 97 % | TEMPERATURE: 97.5 F | SYSTOLIC BLOOD PRESSURE: 126 MMHG

## 2019-09-05 DIAGNOSIS — L73.9 FOLLICULITIS: Primary | ICD-10-CM

## 2019-09-05 PROCEDURE — G8598 ASA/ANTIPLAT THER USED: HCPCS | Performed by: PHYSICIAN ASSISTANT

## 2019-09-05 PROCEDURE — 3017F COLORECTAL CA SCREEN DOC REV: CPT | Performed by: PHYSICIAN ASSISTANT

## 2019-09-05 PROCEDURE — 99213 OFFICE O/P EST LOW 20 MIN: CPT | Performed by: PHYSICIAN ASSISTANT

## 2019-09-05 PROCEDURE — 1036F TOBACCO NON-USER: CPT | Performed by: PHYSICIAN ASSISTANT

## 2019-09-05 PROCEDURE — G8417 CALC BMI ABV UP PARAM F/U: HCPCS | Performed by: PHYSICIAN ASSISTANT

## 2019-09-05 PROCEDURE — G8427 DOCREV CUR MEDS BY ELIG CLIN: HCPCS | Performed by: PHYSICIAN ASSISTANT

## 2019-09-05 RX ORDER — MUPIROCIN CALCIUM 20 MG/G
CREAM TOPICAL
Qty: 1 TUBE | Refills: 1 | Status: SHIPPED | OUTPATIENT
Start: 2019-09-05 | End: 2019-10-05

## 2019-09-09 ENCOUNTER — TELEPHONE (OUTPATIENT)
Dept: FAMILY MEDICINE CLINIC | Age: 64
End: 2019-09-09

## 2019-09-09 NOTE — TELEPHONE ENCOUNTER
PREV MESSAGE. SPOKE WITH [facility] TO ADVISE BIB TALLEY TO USE OINTMENT. WG/SPARKLE (E) BACTROBAN 2% OINTMENT TID TO GROIN X 2 WEEKS #1/1.     Electronically signed by Monica Morton MA on 9/9/2019 at 2:25 PM

## 2019-09-25 ENCOUNTER — HOSPITAL ENCOUNTER (OUTPATIENT)
Dept: SLEEP CENTER | Age: 64
Discharge: HOME | End: 2019-09-25
Payer: MEDICARE

## 2019-09-25 DIAGNOSIS — G47.33 OBSTRUCTIVE SLEEP APNEA (ADULT) (PEDIATRIC): Primary | ICD-10-CM

## 2019-09-25 DIAGNOSIS — G47.10 HYPERSOMNIA, UNSPECIFIED: ICD-10-CM

## 2019-09-25 PROCEDURE — 95811 POLYSOM 6/>YRS CPAP 4/> PARM: CPT

## 2019-10-04 LAB — STATUS: NORMAL

## 2019-10-18 ENCOUNTER — OFFICE VISIT (OUTPATIENT)
Dept: FAMILY MEDICINE CLINIC | Age: 64
End: 2019-10-18
Payer: MEDICARE

## 2019-10-18 VITALS
HEART RATE: 88 BPM | WEIGHT: 224 LBS | HEIGHT: 71 IN | SYSTOLIC BLOOD PRESSURE: 120 MMHG | BODY MASS INDEX: 31.36 KG/M2 | DIASTOLIC BLOOD PRESSURE: 86 MMHG

## 2019-10-18 DIAGNOSIS — I10 ESSENTIAL HYPERTENSION: ICD-10-CM

## 2019-10-18 DIAGNOSIS — G40.909 SEIZURE DISORDER (HCC): ICD-10-CM

## 2019-10-18 DIAGNOSIS — E78.5 DYSLIPIDEMIA: ICD-10-CM

## 2019-10-18 LAB
A/G RATIO: 3.7 (ref 1.1–2.2)
ALBUMIN SERPL-MCNC: 5.6 G/DL (ref 3.4–5)
ALP BLD-CCNC: 43 U/L (ref 40–129)
ALT SERPL-CCNC: 65 U/L (ref 10–40)
ANION GAP SERPL CALCULATED.3IONS-SCNC: 19 MMOL/L (ref 3–16)
AST SERPL-CCNC: 55 U/L (ref 15–37)
BASOPHILS ABSOLUTE: 0 K/UL (ref 0–0.2)
BASOPHILS RELATIVE PERCENT: 0.5 %
BILIRUB SERPL-MCNC: 0.3 MG/DL (ref 0–1)
BUN BLDV-MCNC: 10 MG/DL (ref 7–20)
CALCIUM SERPL-MCNC: 10.4 MG/DL (ref 8.3–10.6)
CHLORIDE BLD-SCNC: 101 MMOL/L (ref 99–110)
CO2: 23 MMOL/L (ref 21–32)
CREAT SERPL-MCNC: 0.9 MG/DL (ref 0.8–1.3)
EOSINOPHILS ABSOLUTE: 0 K/UL (ref 0–0.6)
EOSINOPHILS RELATIVE PERCENT: 0.3 %
GFR AFRICAN AMERICAN: >60
GFR NON-AFRICAN AMERICAN: >60
GLOBULIN: 1.5 G/DL
GLUCOSE BLD-MCNC: 128 MG/DL (ref 70–99)
HCT VFR BLD CALC: 46.8 % (ref 40.5–52.5)
HEMOGLOBIN: 15.8 G/DL (ref 13.5–17.5)
LYMPHOCYTES ABSOLUTE: 1.4 K/UL (ref 1–5.1)
LYMPHOCYTES RELATIVE PERCENT: 25.4 %
MCH RBC QN AUTO: 31.5 PG (ref 26–34)
MCHC RBC AUTO-ENTMCNC: 33.7 G/DL (ref 31–36)
MCV RBC AUTO: 93.3 FL (ref 80–100)
MONOCYTES ABSOLUTE: 0.5 K/UL (ref 0–1.3)
MONOCYTES RELATIVE PERCENT: 9.7 %
NEUTROPHILS ABSOLUTE: 3.6 K/UL (ref 1.7–7.7)
NEUTROPHILS RELATIVE PERCENT: 64.1 %
PDW BLD-RTO: 14 % (ref 12.4–15.4)
PLATELET # BLD: 214 K/UL (ref 135–450)
PMV BLD AUTO: 10.4 FL (ref 5–10.5)
POTASSIUM SERPL-SCNC: 5.2 MMOL/L (ref 3.5–5.1)
RBC # BLD: 5.02 M/UL (ref 4.2–5.9)
SODIUM BLD-SCNC: 143 MMOL/L (ref 136–145)
TOTAL PROTEIN: 7.1 G/DL (ref 6.4–8.2)
WBC # BLD: 5.5 K/UL (ref 4–11)

## 2019-10-18 PROCEDURE — G8598 ASA/ANTIPLAT THER USED: HCPCS | Performed by: FAMILY MEDICINE

## 2019-10-18 PROCEDURE — G8417 CALC BMI ABV UP PARAM F/U: HCPCS | Performed by: FAMILY MEDICINE

## 2019-10-18 PROCEDURE — 3017F COLORECTAL CA SCREEN DOC REV: CPT | Performed by: FAMILY MEDICINE

## 2019-10-18 PROCEDURE — 1036F TOBACCO NON-USER: CPT | Performed by: FAMILY MEDICINE

## 2019-10-18 PROCEDURE — G8484 FLU IMMUNIZE NO ADMIN: HCPCS | Performed by: FAMILY MEDICINE

## 2019-10-18 PROCEDURE — 2022F DILAT RTA XM EVC RTNOPTHY: CPT | Performed by: FAMILY MEDICINE

## 2019-10-18 PROCEDURE — 99214 OFFICE O/P EST MOD 30 MIN: CPT | Performed by: FAMILY MEDICINE

## 2019-10-18 PROCEDURE — G8427 DOCREV CUR MEDS BY ELIG CLIN: HCPCS | Performed by: FAMILY MEDICINE

## 2019-10-18 ASSESSMENT — ENCOUNTER SYMPTOMS
DIARRHEA: 0
RHINORRHEA: 0
ABDOMINAL PAIN: 0
CHEST TIGHTNESS: 0
SORE THROAT: 0
COUGH: 0
SHORTNESS OF BREATH: 0
CONSTIPATION: 0
SINUS PRESSURE: 0

## 2019-10-18 ASSESSMENT — PATIENT HEALTH QUESTIONNAIRE - PHQ9
1. LITTLE INTEREST OR PLEASURE IN DOING THINGS: 0
SUM OF ALL RESPONSES TO PHQ QUESTIONS 1-9: 0
2. FEELING DOWN, DEPRESSED OR HOPELESS: 0
SUM OF ALL RESPONSES TO PHQ QUESTIONS 1-9: 0
SUM OF ALL RESPONSES TO PHQ9 QUESTIONS 1 & 2: 0

## 2019-10-19 LAB
ESTIMATED AVERAGE GLUCOSE: 177.2 MG/DL
HBA1C MFR BLD: 7.8 %

## 2019-10-21 DIAGNOSIS — E87.5 HYPERKALEMIA: ICD-10-CM

## 2019-10-21 DIAGNOSIS — R79.89 ELEVATED LFTS: Primary | ICD-10-CM

## 2019-10-28 ENCOUNTER — TELEPHONE (OUTPATIENT)
Dept: FAMILY MEDICINE CLINIC | Age: 64
End: 2019-10-28

## 2019-11-07 ENCOUNTER — HOSPITAL ENCOUNTER (OUTPATIENT)
Age: 64
Setting detail: SPECIMEN
Discharge: HOME | End: 2019-11-07
Payer: MEDICARE

## 2019-11-07 DIAGNOSIS — R79.89 ELEVATED LFTS: Primary | ICD-10-CM

## 2019-11-07 LAB
ALBUMIN SERPL-MCNC: 4.7 GM/DL (ref 3.4–5)
ALP BLD-CCNC: 46 IU/L (ref 40–128)
ALT SERPL-CCNC: 70 U/L (ref 10–40)
ANION GAP SERPL CALCULATED.3IONS-SCNC: 15 MMOL/L (ref 4–16)
AST SERPL-CCNC: 69 IU/L (ref 15–37)
BILIRUB SERPL-MCNC: 0.4 MG/DL (ref 0–1)
BUN BLDV-MCNC: 8 MG/DL (ref 6–23)
CALCIUM SERPL-MCNC: 10.2 MG/DL (ref 8.3–10.6)
CHLORIDE BLD-SCNC: 97 MMOL/L (ref 99–110)
CO2: 28 MMOL/L (ref 21–32)
CREAT SERPL-MCNC: 1 MG/DL (ref 0.9–1.3)
GFR AFRICAN AMERICAN: >60 ML/MIN/1.73M2
GFR NON-AFRICAN AMERICAN: >60 ML/MIN/1.73M2
GLUCOSE BLD-MCNC: 159 MG/DL (ref 70–99)
POTASSIUM SERPL-SCNC: 4.8 MMOL/L (ref 3.5–5.1)
SODIUM BLD-SCNC: 140 MMOL/L (ref 135–145)
TOTAL PROTEIN: 6.6 GM/DL (ref 6.4–8.2)

## 2019-11-07 PROCEDURE — 80053 COMPREHEN METABOLIC PANEL: CPT

## 2019-12-05 ENCOUNTER — HOSPITAL ENCOUNTER (OUTPATIENT)
Age: 64
Setting detail: SPECIMEN
Discharge: HOME | End: 2019-12-05
Payer: MEDICARE

## 2019-12-05 LAB
ALBUMIN SERPL-MCNC: 4.7 GM/DL (ref 3.4–5)
ALP BLD-CCNC: 42 IU/L (ref 40–128)
ALT SERPL-CCNC: 77 U/L (ref 10–40)
ANION GAP SERPL CALCULATED.3IONS-SCNC: 17 MMOL/L (ref 4–16)
AST SERPL-CCNC: 55 IU/L (ref 15–37)
BILIRUB SERPL-MCNC: 0.4 MG/DL (ref 0–1)
BUN BLDV-MCNC: 8 MG/DL (ref 6–23)
CALCIUM SERPL-MCNC: 10 MG/DL (ref 8.3–10.6)
CHLORIDE BLD-SCNC: 97 MMOL/L (ref 99–110)
CO2: 22 MMOL/L (ref 21–32)
CREAT SERPL-MCNC: 0.9 MG/DL (ref 0.9–1.3)
GFR AFRICAN AMERICAN: >60 ML/MIN/1.73M2
GFR NON-AFRICAN AMERICAN: >60 ML/MIN/1.73M2
GLUCOSE BLD-MCNC: 280 MG/DL (ref 70–99)
POTASSIUM SERPL-SCNC: 4.9 MMOL/L (ref 3.5–5.1)
SODIUM BLD-SCNC: 136 MMOL/L (ref 135–145)
TOTAL PROTEIN: 6.8 GM/DL (ref 6.4–8.2)

## 2019-12-05 PROCEDURE — 80053 COMPREHEN METABOLIC PANEL: CPT

## 2019-12-11 ENCOUNTER — TELEPHONE (OUTPATIENT)
Dept: FAMILY MEDICINE CLINIC | Age: 64
End: 2019-12-11

## 2019-12-11 RX ORDER — RANITIDINE 150 MG/1
150 TABLET ORAL NIGHTLY
Qty: 60 TABLET | Refills: 3 | Status: SHIPPED | OUTPATIENT
Start: 2019-12-11 | End: 2019-12-13

## 2019-12-13 RX ORDER — FAMOTIDINE 20 MG/1
20 TABLET, FILM COATED ORAL NIGHTLY
Qty: 30 TABLET | Refills: 3 | Status: SHIPPED | OUTPATIENT
Start: 2019-12-13 | End: 2019-12-17

## 2019-12-13 NOTE — TELEPHONE ENCOUNTER
Requested Prescriptions     Signed Prescriptions Disp Refills    metFORMIN (GLUCOPHAGE) 500 MG tablet 120 tablet 3     Sig: Take 2 tablets by mouth 2 times daily (with meals)     Authorizing Provider: Jojo Henry     Ordering User: ISRA Saravia    famotidine (PEPCID) 20 MG tablet 30 tablet 3     Sig: Take 1 tablet by mouth nightly     Authorizing Provider: Jojo Henry     Ordering User: ISRA SAXENA     Please advise for neck pain. Spoke with  kentrell. Not necessarily looking for a narcotic. If possible stronger topical pain reliever. Please return to [facility].

## 2019-12-13 NOTE — TELEPHONE ENCOUNTER
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 Can they get an order to use something other than bengay. (it doesn't work)    [phone]    [employer]

## 2019-12-17 ENCOUNTER — APPOINTMENT (OUTPATIENT)
Dept: CT IMAGING | Age: 64
DRG: 637 | End: 2019-12-17
Payer: MEDICARE

## 2019-12-17 ENCOUNTER — APPOINTMENT (OUTPATIENT)
Dept: GENERAL RADIOLOGY | Age: 64
DRG: 637 | End: 2019-12-17
Payer: MEDICARE

## 2019-12-17 ENCOUNTER — HOSPITAL ENCOUNTER (INPATIENT)
Age: 64
LOS: 5 days | Discharge: HOME HEALTH | DRG: 637 | End: 2019-12-22
Attending: EMERGENCY MEDICINE | Admitting: STUDENT IN AN ORGANIZED HEALTH CARE EDUCATION/TRAINING PROGRAM
Payer: MEDICARE

## 2019-12-17 DIAGNOSIS — R79.89 ELEVATED LACTIC ACID LEVEL: ICD-10-CM

## 2019-12-17 DIAGNOSIS — R41.82 ALTERED MENTAL STATUS, UNSPECIFIED ALTERED MENTAL STATUS TYPE: Primary | ICD-10-CM

## 2019-12-17 LAB
ALBUMIN SERPL-MCNC: 4.7 GM/DL (ref 3.4–5)
ALP BLD-CCNC: 42 IU/L (ref 40–129)
ALT SERPL-CCNC: 92 U/L (ref 10–40)
AMMONIA: 52 UMOL/L (ref 16–60)
ANION GAP SERPL CALCULATED.3IONS-SCNC: 16 MMOL/L (ref 4–16)
APTT: 26.7 SECONDS (ref 25.1–37.1)
AST SERPL-CCNC: 60 IU/L (ref 15–37)
BACTERIA: NEGATIVE /HPF
BASOPHILS ABSOLUTE: 0 K/CU MM
BASOPHILS RELATIVE PERCENT: 0.2 % (ref 0–1)
BILIRUB SERPL-MCNC: 0.5 MG/DL (ref 0–1)
BILIRUBIN URINE: NEGATIVE MG/DL
BLOOD, URINE: ABNORMAL
BUN BLDV-MCNC: 9 MG/DL (ref 6–23)
CALCIUM SERPL-MCNC: 9.6 MG/DL (ref 8.3–10.6)
CHLORIDE BLD-SCNC: 96 MMOL/L (ref 99–110)
CHP ED QC CHECK: NORMAL
CLARITY: CLEAR
CO2: 23 MMOL/L (ref 21–32)
COLOR: YELLOW
CREAT SERPL-MCNC: 0.9 MG/DL (ref 0.9–1.3)
DIFFERENTIAL TYPE: ABNORMAL
EOSINOPHILS ABSOLUTE: 0 K/CU MM
EOSINOPHILS RELATIVE PERCENT: 0 % (ref 0–3)
GFR AFRICAN AMERICAN: >60 ML/MIN/1.73M2
GFR NON-AFRICAN AMERICAN: >60 ML/MIN/1.73M2
GLUCOSE BLD-MCNC: 149 MG/DL (ref 70–99)
GLUCOSE BLD-MCNC: 169 MG/DL (ref 70–99)
GLUCOSE BLD-MCNC: 172 MG/DL
GLUCOSE BLD-MCNC: 172 MG/DL (ref 70–99)
GLUCOSE BLD-MCNC: 192 MG/DL (ref 70–99)
GLUCOSE, URINE: NEGATIVE MG/DL
HCT VFR BLD CALC: 52.3 % (ref 42–52)
HEMOGLOBIN: 17.3 GM/DL (ref 13.5–18)
IMMATURE NEUTROPHIL %: 0.3 % (ref 0–0.43)
INR BLD: 0.98 INDEX
KETONES, URINE: ABNORMAL MG/DL
LACTATE: 3 MMOL/L (ref 0.4–2)
LACTATE: ABNORMAL MMOL/L (ref 0.4–2)
LEUKOCYTE ESTERASE, URINE: ABNORMAL
LYMPHOCYTES ABSOLUTE: 1.1 K/CU MM
LYMPHOCYTES RELATIVE PERCENT: 17.6 % (ref 24–44)
MCH RBC QN AUTO: 31.1 PG (ref 27–31)
MCHC RBC AUTO-ENTMCNC: 33.1 % (ref 32–36)
MCV RBC AUTO: 93.9 FL (ref 78–100)
MONOCYTES ABSOLUTE: 0.6 K/CU MM
MONOCYTES RELATIVE PERCENT: 9.4 % (ref 0–4)
MUCUS: ABNORMAL HPF
NITRITE URINE, QUANTITATIVE: NEGATIVE
NON SQUAM EPI CELLS: <1 /HPF
NUCLEATED RBC %: 0 %
PDW BLD-RTO: 12.4 % (ref 11.7–14.9)
PH, URINE: 6 (ref 5–8)
PLATELET # BLD: 258 K/CU MM (ref 140–440)
PMV BLD AUTO: 10.8 FL (ref 7.5–11.1)
POTASSIUM SERPL-SCNC: 4.3 MMOL/L (ref 3.5–5.1)
PROTEIN UA: 30 MG/DL
PROTHROMBIN TIME: 11.8 SECONDS (ref 11.7–14.5)
RBC # BLD: 5.57 M/CU MM (ref 4.6–6.2)
RBC URINE: 23 /HPF (ref 0–3)
REASON FOR REJECTION: NORMAL
REJECTED TEST: NORMAL
SEGMENTED NEUTROPHILS ABSOLUTE COUNT: 4.5 K/CU MM
SEGMENTED NEUTROPHILS RELATIVE PERCENT: 72.5 % (ref 36–66)
SODIUM BLD-SCNC: 135 MMOL/L (ref 135–145)
SPECIFIC GRAVITY UA: 1.05 (ref 1–1.03)
SPECIFIC GRAVITY UA: ABNORMAL (ref 1–1.03)
TOTAL IMMATURE NEUTOROPHIL: 0.02 K/CU MM
TOTAL NUCLEATED RBC: 0 K/CU MM
TOTAL PROTEIN: 7.6 GM/DL (ref 6.4–8.2)
TRICHOMONAS: ABNORMAL /HPF
TROPONIN T: <0.01 NG/ML
UROBILINOGEN, URINE: 1 MG/DL (ref 0.2–1)
WBC # BLD: 6.2 K/CU MM (ref 4–10.5)
WBC UA: 2 /HPF (ref 0–2)

## 2019-12-17 PROCEDURE — 2580000003 HC RX 258: Performed by: NURSE PRACTITIONER

## 2019-12-17 PROCEDURE — 85025 COMPLETE CBC W/AUTO DIFF WBC: CPT

## 2019-12-17 PROCEDURE — 70450 CT HEAD/BRAIN W/O DYE: CPT

## 2019-12-17 PROCEDURE — 6370000000 HC RX 637 (ALT 250 FOR IP): Performed by: NURSE PRACTITIONER

## 2019-12-17 PROCEDURE — 82962 GLUCOSE BLOOD TEST: CPT

## 2019-12-17 PROCEDURE — 6360000002 HC RX W HCPCS: Performed by: EMERGENCY MEDICINE

## 2019-12-17 PROCEDURE — 71045 X-RAY EXAM CHEST 1 VIEW: CPT

## 2019-12-17 PROCEDURE — 1200000000 HC SEMI PRIVATE

## 2019-12-17 PROCEDURE — 99285 EMERGENCY DEPT VISIT HI MDM: CPT

## 2019-12-17 PROCEDURE — 2580000003 HC RX 258: Performed by: EMERGENCY MEDICINE

## 2019-12-17 PROCEDURE — 82140 ASSAY OF AMMONIA: CPT

## 2019-12-17 PROCEDURE — 70496 CT ANGIOGRAPHY HEAD: CPT

## 2019-12-17 PROCEDURE — 6360000004 HC RX CONTRAST MEDICATION: Performed by: EMERGENCY MEDICINE

## 2019-12-17 PROCEDURE — 81001 URINALYSIS AUTO W/SCOPE: CPT

## 2019-12-17 PROCEDURE — 83605 ASSAY OF LACTIC ACID: CPT

## 2019-12-17 PROCEDURE — 96365 THER/PROPH/DIAG IV INF INIT: CPT

## 2019-12-17 PROCEDURE — 6360000002 HC RX W HCPCS: Performed by: NURSE PRACTITIONER

## 2019-12-17 PROCEDURE — 80053 COMPREHEN METABOLIC PANEL: CPT

## 2019-12-17 PROCEDURE — 93005 ELECTROCARDIOGRAM TRACING: CPT | Performed by: EMERGENCY MEDICINE

## 2019-12-17 PROCEDURE — 85610 PROTHROMBIN TIME: CPT

## 2019-12-17 PROCEDURE — 36415 COLL VENOUS BLD VENIPUNCTURE: CPT

## 2019-12-17 PROCEDURE — 85730 THROMBOPLASTIN TIME PARTIAL: CPT

## 2019-12-17 PROCEDURE — 84484 ASSAY OF TROPONIN QUANT: CPT

## 2019-12-17 RX ORDER — ASPIRIN 81 MG/1
81 TABLET ORAL DAILY
Status: DISCONTINUED | OUTPATIENT
Start: 2019-12-17 | End: 2019-12-22 | Stop reason: HOSPADM

## 2019-12-17 RX ORDER — DOCUSATE SODIUM 100 MG/1
200 CAPSULE, LIQUID FILLED ORAL DAILY
Status: DISCONTINUED | OUTPATIENT
Start: 2019-12-17 | End: 2019-12-22 | Stop reason: HOSPADM

## 2019-12-17 RX ORDER — RANITIDINE 150 MG/1
150 TABLET ORAL 2 TIMES DAILY
COMMUNITY
End: 2020-01-13 | Stop reason: SINTOL

## 2019-12-17 RX ORDER — ATORVASTATIN CALCIUM 40 MG/1
80 TABLET, FILM COATED ORAL DAILY
Status: DISCONTINUED | OUTPATIENT
Start: 2019-12-17 | End: 2019-12-22 | Stop reason: HOSPADM

## 2019-12-17 RX ORDER — LAMOTRIGINE 100 MG/1
250 TABLET ORAL 2 TIMES DAILY
Status: DISCONTINUED | OUTPATIENT
Start: 2019-12-17 | End: 2019-12-22 | Stop reason: HOSPADM

## 2019-12-17 RX ORDER — MIDODRINE HYDROCHLORIDE 5 MG/1
5 TABLET ORAL 2 TIMES DAILY
Status: ON HOLD | COMMUNITY
End: 2020-01-31 | Stop reason: HOSPADM

## 2019-12-17 RX ORDER — ASPIRIN AND DIPYRIDAMOLE 25; 200 MG/1; MG/1
1 CAPSULE, EXTENDED RELEASE ORAL NIGHTLY
COMMUNITY

## 2019-12-17 RX ORDER — PANTOPRAZOLE SODIUM 40 MG/1
40 TABLET, DELAYED RELEASE ORAL
Status: DISCONTINUED | OUTPATIENT
Start: 2019-12-18 | End: 2019-12-22 | Stop reason: HOSPADM

## 2019-12-17 RX ORDER — SODIUM CHLORIDE 0.9 % (FLUSH) 0.9 %
10 SYRINGE (ML) INJECTION PRN
Status: DISCONTINUED | OUTPATIENT
Start: 2019-12-17 | End: 2019-12-17 | Stop reason: SDUPTHER

## 2019-12-17 RX ORDER — 0.9 % SODIUM CHLORIDE 0.9 %
1000 INTRAVENOUS SOLUTION INTRAVENOUS ONCE
Status: COMPLETED | OUTPATIENT
Start: 2019-12-17 | End: 2019-12-17

## 2019-12-17 RX ORDER — DEXTROSE MONOHYDRATE 50 MG/ML
100 INJECTION, SOLUTION INTRAVENOUS PRN
Status: DISCONTINUED | OUTPATIENT
Start: 2019-12-17 | End: 2019-12-22 | Stop reason: HOSPADM

## 2019-12-17 RX ORDER — ASPIRIN AND DIPYRIDAMOLE 25; 200 MG/1; MG/1
1 CAPSULE, EXTENDED RELEASE ORAL DAILY
Status: DISCONTINUED | OUTPATIENT
Start: 2019-12-17 | End: 2019-12-20

## 2019-12-17 RX ORDER — ACETAMINOPHEN 500 MG
1000 TABLET ORAL EVERY 4 HOURS PRN
Status: ON HOLD | COMMUNITY
End: 2020-02-25 | Stop reason: HOSPADM

## 2019-12-17 RX ORDER — 0.9 % SODIUM CHLORIDE 0.9 %
10 VIAL (ML) INJECTION
Status: COMPLETED | OUTPATIENT
Start: 2019-12-17 | End: 2019-12-17

## 2019-12-17 RX ORDER — DEXTROSE MONOHYDRATE 25 G/50ML
12.5 INJECTION, SOLUTION INTRAVENOUS PRN
Status: DISCONTINUED | OUTPATIENT
Start: 2019-12-17 | End: 2019-12-22 | Stop reason: HOSPADM

## 2019-12-17 RX ORDER — SODIUM CHLORIDE 0.9 % (FLUSH) 0.9 %
10 SYRINGE (ML) INJECTION PRN
Status: DISCONTINUED | OUTPATIENT
Start: 2019-12-17 | End: 2019-12-22 | Stop reason: HOSPADM

## 2019-12-17 RX ORDER — SODIUM CHLORIDE 0.9 % (FLUSH) 0.9 %
10 SYRINGE (ML) INJECTION EVERY 12 HOURS SCHEDULED
Status: DISCONTINUED | OUTPATIENT
Start: 2019-12-17 | End: 2019-12-22 | Stop reason: HOSPADM

## 2019-12-17 RX ORDER — NICOTINE POLACRILEX 4 MG
15 LOZENGE BUCCAL PRN
Status: DISCONTINUED | OUTPATIENT
Start: 2019-12-17 | End: 2019-12-22 | Stop reason: HOSPADM

## 2019-12-17 RX ORDER — SODIUM CHLORIDE 0.9 % (FLUSH) 0.9 %
10 SYRINGE (ML) INJECTION EVERY 12 HOURS SCHEDULED
Status: DISCONTINUED | OUTPATIENT
Start: 2019-12-17 | End: 2019-12-17 | Stop reason: SDUPTHER

## 2019-12-17 RX ORDER — LAMOTRIGINE 100 MG/1
250 TABLET ORAL 2 TIMES DAILY
Status: ON HOLD | COMMUNITY
End: 2020-02-25 | Stop reason: HOSPADM

## 2019-12-17 RX ORDER — LACOSAMIDE 100 MG/1
200 TABLET ORAL 2 TIMES DAILY
Status: DISCONTINUED | OUTPATIENT
Start: 2019-12-17 | End: 2019-12-22 | Stop reason: HOSPADM

## 2019-12-17 RX ORDER — MIDODRINE HYDROCHLORIDE 5 MG/1
5 TABLET ORAL 2 TIMES DAILY
Status: DISCONTINUED | OUTPATIENT
Start: 2019-12-17 | End: 2019-12-22 | Stop reason: HOSPADM

## 2019-12-17 RX ORDER — ONDANSETRON 2 MG/ML
4 INJECTION INTRAMUSCULAR; INTRAVENOUS EVERY 6 HOURS PRN
Status: DISCONTINUED | OUTPATIENT
Start: 2019-12-17 | End: 2019-12-22 | Stop reason: HOSPADM

## 2019-12-17 RX ORDER — SODIUM CHLORIDE 9 MG/ML
INJECTION, SOLUTION INTRAVENOUS CONTINUOUS
Status: DISPENSED | OUTPATIENT
Start: 2019-12-17 | End: 2019-12-18

## 2019-12-17 RX ORDER — ONDANSETRON 2 MG/ML
4 INJECTION INTRAMUSCULAR; INTRAVENOUS EVERY 6 HOURS PRN
Status: DISCONTINUED | OUTPATIENT
Start: 2019-12-17 | End: 2019-12-17 | Stop reason: SDUPTHER

## 2019-12-17 RX ADMIN — VANCOMYCIN HYDROCHLORIDE 2000 MG: 1 INJECTION, POWDER, LYOPHILIZED, FOR SOLUTION INTRAVENOUS at 15:24

## 2019-12-17 RX ADMIN — MIDODRINE HYDROCHLORIDE 5 MG: 5 TABLET ORAL at 22:41

## 2019-12-17 RX ADMIN — SODIUM CHLORIDE 1000 ML: 9 INJECTION, SOLUTION INTRAVENOUS at 14:53

## 2019-12-17 RX ADMIN — ENOXAPARIN SODIUM 40 MG: 40 INJECTION SUBCUTANEOUS at 22:41

## 2019-12-17 RX ADMIN — SODIUM CHLORIDE: 9 INJECTION, SOLUTION INTRAVENOUS at 18:31

## 2019-12-17 RX ADMIN — LACOSAMIDE 200 MG: 100 TABLET, FILM COATED ORAL at 22:40

## 2019-12-17 RX ADMIN — Medication 10 ML: at 10:58

## 2019-12-17 RX ADMIN — Medication 10 ML: at 22:42

## 2019-12-17 RX ADMIN — LAMOTRIGINE 250 MG: 100 TABLET ORAL at 22:41

## 2019-12-17 RX ADMIN — CEFEPIME HYDROCHLORIDE 2 G: 2 INJECTION, POWDER, FOR SOLUTION INTRAVENOUS at 13:24

## 2019-12-17 RX ADMIN — IOPAMIDOL 75 ML: 755 INJECTION, SOLUTION INTRAVENOUS at 10:58

## 2019-12-18 ENCOUNTER — APPOINTMENT (OUTPATIENT)
Dept: MRI IMAGING | Age: 64
DRG: 637 | End: 2019-12-18
Payer: MEDICARE

## 2019-12-18 LAB
ALBUMIN SERPL-MCNC: 4.5 GM/DL (ref 3.4–5)
ALP BLD-CCNC: 35 IU/L (ref 40–129)
ALT SERPL-CCNC: 89 U/L (ref 10–40)
ANION GAP SERPL CALCULATED.3IONS-SCNC: 11 MMOL/L (ref 4–16)
AST SERPL-CCNC: 69 IU/L (ref 15–37)
BASOPHILS ABSOLUTE: 0 K/CU MM
BASOPHILS RELATIVE PERCENT: 0 % (ref 0–1)
BILIRUB SERPL-MCNC: 0.6 MG/DL (ref 0–1)
BILIRUBIN DIRECT: 0.2 MG/DL (ref 0–0.3)
BILIRUBIN, INDIRECT: 0.4 MG/DL (ref 0–0.7)
BUN BLDV-MCNC: 8 MG/DL (ref 6–23)
CALCIUM SERPL-MCNC: 9.1 MG/DL (ref 8.3–10.6)
CHLORIDE BLD-SCNC: 101 MMOL/L (ref 99–110)
CHOLESTEROL: 235 MG/DL
CO2: 24 MMOL/L (ref 21–32)
CREAT SERPL-MCNC: 1 MG/DL (ref 0.9–1.3)
DIFFERENTIAL TYPE: ABNORMAL
EOSINOPHILS ABSOLUTE: 0 K/CU MM
EOSINOPHILS RELATIVE PERCENT: 0.2 % (ref 0–3)
ESTIMATED AVERAGE GLUCOSE: 177 MG/DL
GFR AFRICAN AMERICAN: >60 ML/MIN/1.73M2
GFR NON-AFRICAN AMERICAN: >60 ML/MIN/1.73M2
GLUCOSE BLD-MCNC: 132 MG/DL (ref 70–99)
GLUCOSE BLD-MCNC: 146 MG/DL (ref 70–99)
GLUCOSE BLD-MCNC: 156 MG/DL (ref 70–99)
GLUCOSE BLD-MCNC: 157 MG/DL (ref 70–99)
GLUCOSE BLD-MCNC: 173 MG/DL (ref 70–99)
HBA1C MFR BLD: 7.8 % (ref 4.2–6.3)
HCT VFR BLD CALC: 48.4 % (ref 42–52)
HDLC SERPL-MCNC: 47 MG/DL
HEMOGLOBIN: 15.8 GM/DL (ref 13.5–18)
IMMATURE NEUTROPHIL %: 0.2 % (ref 0–0.43)
LACTATE: 1.3 MMOL/L (ref 0.4–2)
LDL CHOLESTEROL DIRECT: 172 MG/DL
LYMPHOCYTES ABSOLUTE: 1.4 K/CU MM
LYMPHOCYTES RELATIVE PERCENT: 27 % (ref 24–44)
MCH RBC QN AUTO: 30.7 PG (ref 27–31)
MCHC RBC AUTO-ENTMCNC: 32.6 % (ref 32–36)
MCV RBC AUTO: 94 FL (ref 78–100)
MONOCYTES ABSOLUTE: 0.7 K/CU MM
MONOCYTES RELATIVE PERCENT: 13 % (ref 0–4)
NUCLEATED RBC %: 0 %
PDW BLD-RTO: 12.7 % (ref 11.7–14.9)
PLATELET # BLD: 233 K/CU MM (ref 140–440)
PMV BLD AUTO: 10.7 FL (ref 7.5–11.1)
POTASSIUM SERPL-SCNC: 4.8 MMOL/L (ref 3.5–5.1)
RBC # BLD: 5.15 M/CU MM (ref 4.6–6.2)
SEGMENTED NEUTROPHILS ABSOLUTE COUNT: 3 K/CU MM
SEGMENTED NEUTROPHILS RELATIVE PERCENT: 59.6 % (ref 36–66)
SODIUM BLD-SCNC: 136 MMOL/L (ref 135–145)
TOTAL IMMATURE NEUTOROPHIL: 0.01 K/CU MM
TOTAL NUCLEATED RBC: 0 K/CU MM
TOTAL PROTEIN: 6.5 GM/DL (ref 6.4–8.2)
TRIGL SERPL-MCNC: 116 MG/DL
WBC # BLD: 5.1 K/CU MM (ref 4–10.5)

## 2019-12-18 PROCEDURE — 80175 DRUG SCREEN QUAN LAMOTRIGINE: CPT

## 2019-12-18 PROCEDURE — 80053 COMPREHEN METABOLIC PANEL: CPT

## 2019-12-18 PROCEDURE — 80061 LIPID PANEL: CPT

## 2019-12-18 PROCEDURE — G0480 DRUG TEST DEF 1-7 CLASSES: HCPCS

## 2019-12-18 PROCEDURE — 36415 COLL VENOUS BLD VENIPUNCTURE: CPT

## 2019-12-18 PROCEDURE — 6360000002 HC RX W HCPCS: Performed by: NURSE PRACTITIONER

## 2019-12-18 PROCEDURE — 2580000003 HC RX 258: Performed by: NURSE PRACTITIONER

## 2019-12-18 PROCEDURE — 82962 GLUCOSE BLOOD TEST: CPT

## 2019-12-18 PROCEDURE — 94761 N-INVAS EAR/PLS OXIMETRY MLT: CPT

## 2019-12-18 PROCEDURE — 6370000000 HC RX 637 (ALT 250 FOR IP): Performed by: PSYCHIATRY & NEUROLOGY

## 2019-12-18 PROCEDURE — 1200000000 HC SEMI PRIVATE

## 2019-12-18 PROCEDURE — 85025 COMPLETE CBC W/AUTO DIFF WBC: CPT

## 2019-12-18 PROCEDURE — 6370000000 HC RX 637 (ALT 250 FOR IP): Performed by: NURSE PRACTITIONER

## 2019-12-18 PROCEDURE — 92610 EVALUATE SWALLOWING FUNCTION: CPT | Performed by: SPEECH-LANGUAGE PATHOLOGIST

## 2019-12-18 PROCEDURE — 83036 HEMOGLOBIN GLYCOSYLATED A1C: CPT

## 2019-12-18 PROCEDURE — 97162 PT EVAL MOD COMPLEX 30 MIN: CPT

## 2019-12-18 PROCEDURE — 93010 ELECTROCARDIOGRAM REPORT: CPT | Performed by: INTERNAL MEDICINE

## 2019-12-18 PROCEDURE — 83605 ASSAY OF LACTIC ACID: CPT

## 2019-12-18 PROCEDURE — 82248 BILIRUBIN DIRECT: CPT

## 2019-12-18 PROCEDURE — 83721 ASSAY OF BLOOD LIPOPROTEIN: CPT

## 2019-12-18 RX ORDER — LORAZEPAM 2 MG/ML
1 INJECTION INTRAMUSCULAR ONCE
Status: COMPLETED | OUTPATIENT
Start: 2019-12-18 | End: 2019-12-18

## 2019-12-18 RX ORDER — DONEPEZIL HYDROCHLORIDE 5 MG/1
5 TABLET, FILM COATED ORAL NIGHTLY
Status: DISCONTINUED | OUTPATIENT
Start: 2019-12-18 | End: 2019-12-20

## 2019-12-18 RX ORDER — DULOXETIN HYDROCHLORIDE 20 MG/1
20 CAPSULE, DELAYED RELEASE ORAL DAILY
Status: DISCONTINUED | OUTPATIENT
Start: 2019-12-18 | End: 2019-12-20

## 2019-12-18 RX ADMIN — LACOSAMIDE 200 MG: 100 TABLET, FILM COATED ORAL at 21:34

## 2019-12-18 RX ADMIN — INSULIN LISPRO 1 UNITS: 100 INJECTION, SOLUTION INTRAVENOUS; SUBCUTANEOUS at 12:06

## 2019-12-18 RX ADMIN — PANTOPRAZOLE SODIUM 40 MG: 40 TABLET, DELAYED RELEASE ORAL at 06:40

## 2019-12-18 RX ADMIN — DULOXETINE HYDROCHLORIDE 20 MG: 20 CAPSULE, DELAYED RELEASE ORAL at 17:26

## 2019-12-18 RX ADMIN — DOCUSATE SODIUM 200 MG: 100 CAPSULE, LIQUID FILLED ORAL at 08:41

## 2019-12-18 RX ADMIN — LORAZEPAM 1 MG: 2 INJECTION INTRAMUSCULAR; INTRAVENOUS at 12:33

## 2019-12-18 RX ADMIN — DONEPEZIL HYDROCHLORIDE 5 MG: 5 TABLET, FILM COATED ORAL at 23:22

## 2019-12-18 RX ADMIN — ASPIRIN AND DIPYRIDAMOLE 1 CAPSULE: 25; 200 CAPSULE, EXTENDED RELEASE ORAL at 08:40

## 2019-12-18 RX ADMIN — ATORVASTATIN CALCIUM 80 MG: 40 TABLET, FILM COATED ORAL at 08:40

## 2019-12-18 RX ADMIN — ASPIRIN 81 MG: 81 TABLET, COATED ORAL at 08:41

## 2019-12-18 RX ADMIN — Medication 10 ML: at 21:34

## 2019-12-18 RX ADMIN — INSULIN LISPRO 1 UNITS: 100 INJECTION, SOLUTION INTRAVENOUS; SUBCUTANEOUS at 17:26

## 2019-12-18 RX ADMIN — OLANZAPINE 7.5 MG: 5 TABLET, FILM COATED ORAL at 08:42

## 2019-12-18 RX ADMIN — LACOSAMIDE 200 MG: 100 TABLET, FILM COATED ORAL at 08:41

## 2019-12-18 RX ADMIN — LAMOTRIGINE 250 MG: 100 TABLET ORAL at 08:40

## 2019-12-18 RX ADMIN — ENOXAPARIN SODIUM 40 MG: 40 INJECTION SUBCUTANEOUS at 08:41

## 2019-12-18 RX ADMIN — INSULIN LISPRO 1 UNITS: 100 INJECTION, SOLUTION INTRAVENOUS; SUBCUTANEOUS at 08:32

## 2019-12-18 RX ADMIN — LAMOTRIGINE 250 MG: 100 TABLET ORAL at 21:34

## 2019-12-18 RX ADMIN — SODIUM CHLORIDE: 9 INJECTION, SOLUTION INTRAVENOUS at 06:42

## 2019-12-18 ASSESSMENT — PAIN SCALES - GENERAL
PAINLEVEL_OUTOF10: 0
PAINLEVEL_OUTOF10: 0

## 2019-12-19 LAB
FOLATE: 18.2 NG/ML (ref 3.1–17.5)
GLUCOSE BLD-MCNC: 166 MG/DL (ref 70–99)
GLUCOSE BLD-MCNC: 171 MG/DL (ref 70–99)
GLUCOSE BLD-MCNC: 189 MG/DL (ref 70–99)
GLUCOSE BLD-MCNC: 200 MG/DL (ref 70–99)
LAMOTRIGINE LEVEL: 11.2 UG/ML (ref 2.5–15)
LAMOTRIGINE LEVEL: NORMAL UG/ML (ref 2.5–15)
TSH HIGH SENSITIVITY: 1.91 UIU/ML (ref 0.27–4.2)
VITAMIN B-12: 481.9 PG/ML (ref 211–911)

## 2019-12-19 PROCEDURE — 2580000003 HC RX 258: Performed by: NURSE PRACTITIONER

## 2019-12-19 PROCEDURE — 6370000000 HC RX 637 (ALT 250 FOR IP): Performed by: PSYCHIATRY & NEUROLOGY

## 2019-12-19 PROCEDURE — 82746 ASSAY OF FOLIC ACID SERUM: CPT

## 2019-12-19 PROCEDURE — 6360000002 HC RX W HCPCS: Performed by: NURSE PRACTITIONER

## 2019-12-19 PROCEDURE — 90686 IIV4 VACC NO PRSV 0.5 ML IM: CPT | Performed by: STUDENT IN AN ORGANIZED HEALTH CARE EDUCATION/TRAINING PROGRAM

## 2019-12-19 PROCEDURE — 1200000000 HC SEMI PRIVATE

## 2019-12-19 PROCEDURE — G0008 ADMIN INFLUENZA VIRUS VAC: HCPCS | Performed by: STUDENT IN AN ORGANIZED HEALTH CARE EDUCATION/TRAINING PROGRAM

## 2019-12-19 PROCEDURE — 6370000000 HC RX 637 (ALT 250 FOR IP): Performed by: NURSE PRACTITIONER

## 2019-12-19 PROCEDURE — 84443 ASSAY THYROID STIM HORMONE: CPT

## 2019-12-19 PROCEDURE — 36415 COLL VENOUS BLD VENIPUNCTURE: CPT

## 2019-12-19 PROCEDURE — 82607 VITAMIN B-12: CPT

## 2019-12-19 PROCEDURE — 82962 GLUCOSE BLOOD TEST: CPT

## 2019-12-19 PROCEDURE — 6360000002 HC RX W HCPCS: Performed by: STUDENT IN AN ORGANIZED HEALTH CARE EDUCATION/TRAINING PROGRAM

## 2019-12-19 RX ORDER — HYDRALAZINE HYDROCHLORIDE 25 MG/1
25 TABLET, FILM COATED ORAL EVERY 6 HOURS PRN
Status: DISCONTINUED | OUTPATIENT
Start: 2019-12-19 | End: 2019-12-22 | Stop reason: HOSPADM

## 2019-12-19 RX ORDER — LISINOPRIL 10 MG/1
10 TABLET ORAL DAILY
Status: DISCONTINUED | OUTPATIENT
Start: 2019-12-19 | End: 2019-12-22 | Stop reason: HOSPADM

## 2019-12-19 RX ADMIN — LAMOTRIGINE 250 MG: 100 TABLET ORAL at 10:28

## 2019-12-19 RX ADMIN — MIDODRINE HYDROCHLORIDE 5 MG: 5 TABLET ORAL at 21:28

## 2019-12-19 RX ADMIN — INFLUENZA A VIRUS A/BRISBANE/02/2018 IVR-190 (H1N1) ANTIGEN (PROPIOLACTONE INACTIVATED), INFLUENZA A VIRUS A/KANSAS/14/2017 X-327 (H3N2) ANTIGEN (PROPIOLACTONE INACTIVATED), INFLUENZA B VIRUS B/MARYLAND/15/2016 ANTIGEN (PROPIOLACTONE INACTIVATED), INFLUENZA B VIRUS B/PHUKET/3073/2013 BVR-1B ANTIGEN (PROPIOLACTONE INACTIVATED) 0.5 ML: 15; 15; 15; 15 INJECTION, SUSPENSION INTRAMUSCULAR at 10:41

## 2019-12-19 RX ADMIN — INSULIN LISPRO 1 UNITS: 100 INJECTION, SOLUTION INTRAVENOUS; SUBCUTANEOUS at 16:54

## 2019-12-19 RX ADMIN — LISINOPRIL 10 MG: 10 TABLET ORAL at 07:04

## 2019-12-19 RX ADMIN — LAMOTRIGINE 250 MG: 100 TABLET ORAL at 21:27

## 2019-12-19 RX ADMIN — DOCUSATE SODIUM 200 MG: 100 CAPSULE, LIQUID FILLED ORAL at 10:27

## 2019-12-19 RX ADMIN — ENOXAPARIN SODIUM 40 MG: 40 INJECTION SUBCUTANEOUS at 10:28

## 2019-12-19 RX ADMIN — INSULIN LISPRO 1 UNITS: 100 INJECTION, SOLUTION INTRAVENOUS; SUBCUTANEOUS at 10:32

## 2019-12-19 RX ADMIN — DULOXETINE HYDROCHLORIDE 20 MG: 20 CAPSULE, DELAYED RELEASE ORAL at 10:28

## 2019-12-19 RX ADMIN — ATORVASTATIN CALCIUM 80 MG: 40 TABLET, FILM COATED ORAL at 10:28

## 2019-12-19 RX ADMIN — LACOSAMIDE 200 MG: 100 TABLET, FILM COATED ORAL at 10:39

## 2019-12-19 RX ADMIN — OLANZAPINE 7.5 MG: 5 TABLET, FILM COATED ORAL at 10:27

## 2019-12-19 RX ADMIN — ASPIRIN 81 MG: 81 TABLET, COATED ORAL at 10:27

## 2019-12-19 RX ADMIN — DONEPEZIL HYDROCHLORIDE 5 MG: 5 TABLET, FILM COATED ORAL at 21:28

## 2019-12-19 RX ADMIN — ASPIRIN AND DIPYRIDAMOLE 1 CAPSULE: 25; 200 CAPSULE, EXTENDED RELEASE ORAL at 10:40

## 2019-12-19 RX ADMIN — Medication 10 ML: at 21:28

## 2019-12-19 RX ADMIN — HYDRALAZINE HYDROCHLORIDE 25 MG: 25 TABLET, FILM COATED ORAL at 03:44

## 2019-12-19 RX ADMIN — PANTOPRAZOLE SODIUM 40 MG: 40 TABLET, DELAYED RELEASE ORAL at 06:26

## 2019-12-19 RX ADMIN — HYDRALAZINE HYDROCHLORIDE 25 MG: 25 TABLET, FILM COATED ORAL at 15:04

## 2019-12-19 RX ADMIN — HYDRALAZINE HYDROCHLORIDE 10 MG: 20 INJECTION INTRAMUSCULAR; INTRAVENOUS at 02:55

## 2019-12-19 RX ADMIN — INSULIN LISPRO 2 UNITS: 100 INJECTION, SOLUTION INTRAVENOUS; SUBCUTANEOUS at 13:45

## 2019-12-19 RX ADMIN — LACOSAMIDE 200 MG: 100 TABLET, FILM COATED ORAL at 21:28

## 2019-12-19 ASSESSMENT — PAIN SCALES - GENERAL: PAINLEVEL_OUTOF10: 0

## 2019-12-20 LAB
ANION GAP SERPL CALCULATED.3IONS-SCNC: 13 MMOL/L (ref 4–16)
BASOPHILS ABSOLUTE: 0 K/CU MM
BASOPHILS RELATIVE PERCENT: 0.1 % (ref 0–1)
BUN BLDV-MCNC: 9 MG/DL (ref 6–23)
CALCIUM SERPL-MCNC: 9.4 MG/DL (ref 8.3–10.6)
CHLORIDE BLD-SCNC: 102 MMOL/L (ref 99–110)
CO2: 21 MMOL/L (ref 21–32)
CREAT SERPL-MCNC: 0.9 MG/DL (ref 0.9–1.3)
DIFFERENTIAL TYPE: ABNORMAL
EOSINOPHILS ABSOLUTE: 0 K/CU MM
EOSINOPHILS RELATIVE PERCENT: 0 % (ref 0–3)
GFR AFRICAN AMERICAN: >60 ML/MIN/1.73M2
GFR NON-AFRICAN AMERICAN: >60 ML/MIN/1.73M2
GLUCOSE BLD-MCNC: 154 MG/DL (ref 70–99)
GLUCOSE BLD-MCNC: 174 MG/DL (ref 70–99)
GLUCOSE BLD-MCNC: 177 MG/DL (ref 70–99)
GLUCOSE BLD-MCNC: 201 MG/DL (ref 70–99)
GLUCOSE BLD-MCNC: 202 MG/DL (ref 70–99)
GLUCOSE BLD-MCNC: 223 MG/DL (ref 70–99)
HCT VFR BLD CALC: 49.7 % (ref 42–52)
HEMOGLOBIN: 16.7 GM/DL (ref 13.5–18)
IMMATURE NEUTROPHIL %: 0.3 % (ref 0–0.43)
LACOSAMIDE: 7.9 UG/ML (ref 5–10)
LACOSAMIDE: NORMAL UG/ML (ref 5–10)
LYMPHOCYTES ABSOLUTE: 1 K/CU MM
LYMPHOCYTES RELATIVE PERCENT: 11 % (ref 24–44)
MCH RBC QN AUTO: 30.9 PG (ref 27–31)
MCHC RBC AUTO-ENTMCNC: 33.6 % (ref 32–36)
MCV RBC AUTO: 91.9 FL (ref 78–100)
MONOCYTES ABSOLUTE: 0.8 K/CU MM
MONOCYTES RELATIVE PERCENT: 8.7 % (ref 0–4)
NUCLEATED RBC %: 0 %
PDW BLD-RTO: 12.8 % (ref 11.7–14.9)
PLATELET # BLD: 252 K/CU MM (ref 140–440)
PMV BLD AUTO: 10.6 FL (ref 7.5–11.1)
POTASSIUM SERPL-SCNC: 4.4 MMOL/L (ref 3.5–5.1)
RBC # BLD: 5.41 M/CU MM (ref 4.6–6.2)
SEGMENTED NEUTROPHILS ABSOLUTE COUNT: 6.9 K/CU MM
SEGMENTED NEUTROPHILS RELATIVE PERCENT: 79.9 % (ref 36–66)
SODIUM BLD-SCNC: 136 MMOL/L (ref 135–145)
TOTAL IMMATURE NEUTOROPHIL: 0.03 K/CU MM
TOTAL NUCLEATED RBC: 0 K/CU MM
WBC # BLD: 8.6 K/CU MM (ref 4–10.5)

## 2019-12-20 PROCEDURE — 1200000000 HC SEMI PRIVATE

## 2019-12-20 PROCEDURE — 6370000000 HC RX 637 (ALT 250 FOR IP): Performed by: STUDENT IN AN ORGANIZED HEALTH CARE EDUCATION/TRAINING PROGRAM

## 2019-12-20 PROCEDURE — 6370000000 HC RX 637 (ALT 250 FOR IP): Performed by: NURSE PRACTITIONER

## 2019-12-20 PROCEDURE — 82962 GLUCOSE BLOOD TEST: CPT

## 2019-12-20 PROCEDURE — 6370000000 HC RX 637 (ALT 250 FOR IP): Performed by: PSYCHIATRY & NEUROLOGY

## 2019-12-20 PROCEDURE — 2580000003 HC RX 258: Performed by: NURSE PRACTITIONER

## 2019-12-20 PROCEDURE — 80048 BASIC METABOLIC PNL TOTAL CA: CPT

## 2019-12-20 PROCEDURE — 85025 COMPLETE CBC W/AUTO DIFF WBC: CPT

## 2019-12-20 PROCEDURE — 36415 COLL VENOUS BLD VENIPUNCTURE: CPT

## 2019-12-20 PROCEDURE — 6360000002 HC RX W HCPCS: Performed by: NURSE PRACTITIONER

## 2019-12-20 RX ORDER — DULOXETIN HYDROCHLORIDE 30 MG/1
30 CAPSULE, DELAYED RELEASE ORAL DAILY
Status: DISCONTINUED | OUTPATIENT
Start: 2019-12-21 | End: 2019-12-22 | Stop reason: HOSPADM

## 2019-12-20 RX ORDER — DONEPEZIL HYDROCHLORIDE 10 MG/1
10 TABLET, FILM COATED ORAL NIGHTLY
Status: DISCONTINUED | OUTPATIENT
Start: 2019-12-20 | End: 2019-12-22 | Stop reason: HOSPADM

## 2019-12-20 RX ORDER — ASPIRIN AND DIPYRIDAMOLE 25; 200 MG/1; MG/1
1 CAPSULE, EXTENDED RELEASE ORAL 2 TIMES DAILY
Status: DISCONTINUED | OUTPATIENT
Start: 2019-12-20 | End: 2019-12-22 | Stop reason: HOSPADM

## 2019-12-20 RX ADMIN — LACOSAMIDE 200 MG: 100 TABLET, FILM COATED ORAL at 09:22

## 2019-12-20 RX ADMIN — ENOXAPARIN SODIUM 40 MG: 40 INJECTION SUBCUTANEOUS at 09:22

## 2019-12-20 RX ADMIN — HYDRALAZINE HYDROCHLORIDE 25 MG: 25 TABLET, FILM COATED ORAL at 12:10

## 2019-12-20 RX ADMIN — INSULIN LISPRO 2 UNITS: 100 INJECTION, SOLUTION INTRAVENOUS; SUBCUTANEOUS at 16:53

## 2019-12-20 RX ADMIN — METOPROLOL TARTRATE 25 MG: 25 TABLET ORAL at 14:34

## 2019-12-20 RX ADMIN — OLANZAPINE 7.5 MG: 5 TABLET, FILM COATED ORAL at 12:09

## 2019-12-20 RX ADMIN — DULOXETINE HYDROCHLORIDE 20 MG: 20 CAPSULE, DELAYED RELEASE ORAL at 09:22

## 2019-12-20 RX ADMIN — LAMOTRIGINE 250 MG: 100 TABLET ORAL at 09:21

## 2019-12-20 RX ADMIN — LACOSAMIDE 200 MG: 100 TABLET, FILM COATED ORAL at 20:19

## 2019-12-20 RX ADMIN — ATORVASTATIN CALCIUM 80 MG: 40 TABLET, FILM COATED ORAL at 09:21

## 2019-12-20 RX ADMIN — LAMOTRIGINE 250 MG: 100 TABLET ORAL at 20:19

## 2019-12-20 RX ADMIN — ASPIRIN AND DIPYRIDAMOLE 1 CAPSULE: 25; 200 CAPSULE, EXTENDED RELEASE ORAL at 20:19

## 2019-12-20 RX ADMIN — METOPROLOL TARTRATE 25 MG: 25 TABLET ORAL at 20:19

## 2019-12-20 RX ADMIN — Medication 10 ML: at 08:00

## 2019-12-20 RX ADMIN — LISINOPRIL 10 MG: 10 TABLET ORAL at 09:21

## 2019-12-20 RX ADMIN — DOCUSATE SODIUM 200 MG: 100 CAPSULE, LIQUID FILLED ORAL at 09:21

## 2019-12-20 RX ADMIN — Medication 10 ML: at 20:20

## 2019-12-20 RX ADMIN — INSULIN LISPRO 1 UNITS: 100 INJECTION, SOLUTION INTRAVENOUS; SUBCUTANEOUS at 09:25

## 2019-12-20 RX ADMIN — MIDODRINE HYDROCHLORIDE 5 MG: 5 TABLET ORAL at 09:22

## 2019-12-20 RX ADMIN — ASPIRIN AND DIPYRIDAMOLE 1 CAPSULE: 25; 200 CAPSULE, EXTENDED RELEASE ORAL at 09:22

## 2019-12-20 RX ADMIN — DONEPEZIL HYDROCHLORIDE 10 MG: 10 TABLET, FILM COATED ORAL at 20:19

## 2019-12-20 RX ADMIN — ASPIRIN 81 MG: 81 TABLET, COATED ORAL at 09:22

## 2019-12-20 RX ADMIN — MIDODRINE HYDROCHLORIDE 5 MG: 5 TABLET ORAL at 17:48

## 2019-12-20 RX ADMIN — PANTOPRAZOLE SODIUM 40 MG: 40 TABLET, DELAYED RELEASE ORAL at 06:34

## 2019-12-20 ASSESSMENT — PAIN SCALES - GENERAL
PAINLEVEL_OUTOF10: 0

## 2019-12-21 LAB
ANION GAP SERPL CALCULATED.3IONS-SCNC: 15 MMOL/L (ref 4–16)
BASOPHILS ABSOLUTE: 0 K/CU MM
BASOPHILS RELATIVE PERCENT: 0.1 % (ref 0–1)
BUN BLDV-MCNC: 20 MG/DL (ref 6–23)
CALCIUM SERPL-MCNC: 10.1 MG/DL (ref 8.3–10.6)
CHLORIDE BLD-SCNC: 103 MMOL/L (ref 99–110)
CO2: 20 MMOL/L (ref 21–32)
CREAT SERPL-MCNC: 1.3 MG/DL (ref 0.9–1.3)
DIFFERENTIAL TYPE: ABNORMAL
EOSINOPHILS ABSOLUTE: 0 K/CU MM
EOSINOPHILS RELATIVE PERCENT: 0.1 % (ref 0–3)
GFR AFRICAN AMERICAN: >60 ML/MIN/1.73M2
GFR NON-AFRICAN AMERICAN: 56 ML/MIN/1.73M2
GLUCOSE BLD-MCNC: 158 MG/DL (ref 70–99)
GLUCOSE BLD-MCNC: 163 MG/DL (ref 70–99)
GLUCOSE BLD-MCNC: 169 MG/DL (ref 70–99)
GLUCOSE BLD-MCNC: 209 MG/DL (ref 70–99)
GLUCOSE BLD-MCNC: 227 MG/DL (ref 70–99)
HCT VFR BLD CALC: 50.5 % (ref 42–52)
HEMOGLOBIN: 16.7 GM/DL (ref 13.5–18)
IMMATURE NEUTROPHIL %: 0.3 % (ref 0–0.43)
LYMPHOCYTES ABSOLUTE: 1.8 K/CU MM
LYMPHOCYTES RELATIVE PERCENT: 17.6 % (ref 24–44)
MCH RBC QN AUTO: 30.8 PG (ref 27–31)
MCHC RBC AUTO-ENTMCNC: 33.1 % (ref 32–36)
MCV RBC AUTO: 93.2 FL (ref 78–100)
MONOCYTES ABSOLUTE: 1.2 K/CU MM
MONOCYTES RELATIVE PERCENT: 11.4 % (ref 0–4)
NUCLEATED RBC %: 0 %
PDW BLD-RTO: 13 % (ref 11.7–14.9)
PLATELET # BLD: 272 K/CU MM (ref 140–440)
PMV BLD AUTO: 10.6 FL (ref 7.5–11.1)
POTASSIUM SERPL-SCNC: 4.5 MMOL/L (ref 3.5–5.1)
RBC # BLD: 5.42 M/CU MM (ref 4.6–6.2)
SEGMENTED NEUTROPHILS ABSOLUTE COUNT: 7.3 K/CU MM
SEGMENTED NEUTROPHILS RELATIVE PERCENT: 70.5 % (ref 36–66)
SODIUM BLD-SCNC: 138 MMOL/L (ref 135–145)
TOTAL IMMATURE NEUTOROPHIL: 0.03 K/CU MM
TOTAL NUCLEATED RBC: 0 K/CU MM
WBC # BLD: 10.3 K/CU MM (ref 4–10.5)

## 2019-12-21 PROCEDURE — 80048 BASIC METABOLIC PNL TOTAL CA: CPT

## 2019-12-21 PROCEDURE — 1200000000 HC SEMI PRIVATE

## 2019-12-21 PROCEDURE — 6370000000 HC RX 637 (ALT 250 FOR IP): Performed by: STUDENT IN AN ORGANIZED HEALTH CARE EDUCATION/TRAINING PROGRAM

## 2019-12-21 PROCEDURE — 6370000000 HC RX 637 (ALT 250 FOR IP): Performed by: NURSE PRACTITIONER

## 2019-12-21 PROCEDURE — 82962 GLUCOSE BLOOD TEST: CPT

## 2019-12-21 PROCEDURE — 2580000003 HC RX 258: Performed by: NURSE PRACTITIONER

## 2019-12-21 PROCEDURE — 94761 N-INVAS EAR/PLS OXIMETRY MLT: CPT

## 2019-12-21 PROCEDURE — 85025 COMPLETE CBC W/AUTO DIFF WBC: CPT

## 2019-12-21 PROCEDURE — 6360000002 HC RX W HCPCS: Performed by: NURSE PRACTITIONER

## 2019-12-21 PROCEDURE — 36415 COLL VENOUS BLD VENIPUNCTURE: CPT

## 2019-12-21 PROCEDURE — 6370000000 HC RX 637 (ALT 250 FOR IP): Performed by: PSYCHIATRY & NEUROLOGY

## 2019-12-21 RX ORDER — LORAZEPAM 1 MG/1
1 TABLET ORAL ONCE
Status: COMPLETED | OUTPATIENT
Start: 2019-12-21 | End: 2019-12-21

## 2019-12-21 RX ORDER — DULOXETIN HYDROCHLORIDE 30 MG/1
30 CAPSULE, DELAYED RELEASE ORAL DAILY
Qty: 30 CAPSULE | Refills: 0 | Status: ON HOLD | OUTPATIENT
Start: 2019-12-21 | End: 2020-02-25 | Stop reason: HOSPADM

## 2019-12-21 RX ORDER — DONEPEZIL HYDROCHLORIDE 10 MG/1
10 TABLET, FILM COATED ORAL NIGHTLY
Qty: 30 TABLET | Refills: 0 | Status: ON HOLD | OUTPATIENT
Start: 2019-12-21 | End: 2020-01-31 | Stop reason: HOSPADM

## 2019-12-21 RX ORDER — LISINOPRIL 10 MG/1
10 TABLET ORAL DAILY
Qty: 30 TABLET | Refills: 0 | Status: SHIPPED | OUTPATIENT
Start: 2019-12-21

## 2019-12-21 RX ADMIN — METOPROLOL TARTRATE 25 MG: 25 TABLET ORAL at 11:31

## 2019-12-21 RX ADMIN — DOCUSATE SODIUM 200 MG: 100 CAPSULE, LIQUID FILLED ORAL at 11:31

## 2019-12-21 RX ADMIN — MIDODRINE HYDROCHLORIDE 5 MG: 5 TABLET ORAL at 11:30

## 2019-12-21 RX ADMIN — LACOSAMIDE 200 MG: 100 TABLET, FILM COATED ORAL at 11:30

## 2019-12-21 RX ADMIN — ASPIRIN AND DIPYRIDAMOLE 1 CAPSULE: 25; 200 CAPSULE, EXTENDED RELEASE ORAL at 20:44

## 2019-12-21 RX ADMIN — METOPROLOL TARTRATE 25 MG: 25 TABLET ORAL at 20:44

## 2019-12-21 RX ADMIN — LAMOTRIGINE 250 MG: 100 TABLET ORAL at 11:29

## 2019-12-21 RX ADMIN — DULOXETINE HYDROCHLORIDE 30 MG: 30 CAPSULE, DELAYED RELEASE ORAL at 11:30

## 2019-12-21 RX ADMIN — MIDODRINE HYDROCHLORIDE 5 MG: 5 TABLET ORAL at 20:44

## 2019-12-21 RX ADMIN — Medication 10 ML: at 11:32

## 2019-12-21 RX ADMIN — ASPIRIN 81 MG: 81 TABLET, COATED ORAL at 11:29

## 2019-12-21 RX ADMIN — PANTOPRAZOLE SODIUM 40 MG: 40 TABLET, DELAYED RELEASE ORAL at 05:24

## 2019-12-21 RX ADMIN — DONEPEZIL HYDROCHLORIDE 10 MG: 10 TABLET, FILM COATED ORAL at 20:44

## 2019-12-21 RX ADMIN — INSULIN LISPRO 2 UNITS: 100 INJECTION, SOLUTION INTRAVENOUS; SUBCUTANEOUS at 13:09

## 2019-12-21 RX ADMIN — OLANZAPINE 7.5 MG: 5 TABLET, FILM COATED ORAL at 11:31

## 2019-12-21 RX ADMIN — INSULIN LISPRO 1 UNITS: 100 INJECTION, SOLUTION INTRAVENOUS; SUBCUTANEOUS at 18:14

## 2019-12-21 RX ADMIN — LACOSAMIDE 200 MG: 100 TABLET, FILM COATED ORAL at 20:44

## 2019-12-21 RX ADMIN — Medication 10 ML: at 20:44

## 2019-12-21 RX ADMIN — LORAZEPAM 1 MG: 1 TABLET ORAL at 01:15

## 2019-12-21 RX ADMIN — ATORVASTATIN CALCIUM 80 MG: 40 TABLET, FILM COATED ORAL at 11:29

## 2019-12-21 RX ADMIN — LAMOTRIGINE 250 MG: 100 TABLET ORAL at 20:43

## 2019-12-21 RX ADMIN — ASPIRIN AND DIPYRIDAMOLE 1 CAPSULE: 25; 200 CAPSULE, EXTENDED RELEASE ORAL at 11:29

## 2019-12-21 RX ADMIN — ENOXAPARIN SODIUM 40 MG: 40 INJECTION SUBCUTANEOUS at 11:29

## 2019-12-21 RX ADMIN — LISINOPRIL 10 MG: 10 TABLET ORAL at 11:30

## 2019-12-21 ASSESSMENT — PAIN SCALES - GENERAL
PAINLEVEL_OUTOF10: 0

## 2019-12-21 ASSESSMENT — PAIN SCALES - WONG BAKER: WONGBAKER_NUMERICALRESPONSE: 0

## 2019-12-22 ENCOUNTER — APPOINTMENT (OUTPATIENT)
Dept: MRI IMAGING | Age: 64
DRG: 637 | End: 2019-12-22
Payer: MEDICARE

## 2019-12-22 VITALS
HEIGHT: 70 IN | OXYGEN SATURATION: 95 % | TEMPERATURE: 98.2 F | RESPIRATION RATE: 19 BRPM | SYSTOLIC BLOOD PRESSURE: 124 MMHG | BODY MASS INDEX: 29.2 KG/M2 | WEIGHT: 204 LBS | DIASTOLIC BLOOD PRESSURE: 64 MMHG | HEART RATE: 85 BPM

## 2019-12-22 LAB
BASOPHILS ABSOLUTE: 0 K/CU MM
BASOPHILS RELATIVE PERCENT: 0.1 % (ref 0–1)
DIFFERENTIAL TYPE: ABNORMAL
EOSINOPHILS ABSOLUTE: 0 K/CU MM
EOSINOPHILS RELATIVE PERCENT: 0.4 % (ref 0–3)
GLUCOSE BLD-MCNC: 116 MG/DL (ref 70–99)
GLUCOSE BLD-MCNC: 151 MG/DL (ref 70–99)
HCT VFR BLD CALC: 53.7 % (ref 42–52)
HEMOGLOBIN: 17.3 GM/DL (ref 13.5–18)
IMMATURE NEUTROPHIL %: 0.4 % (ref 0–0.43)
LYMPHOCYTES ABSOLUTE: 1.7 K/CU MM
LYMPHOCYTES RELATIVE PERCENT: 20.6 % (ref 24–44)
MCH RBC QN AUTO: 30.9 PG (ref 27–31)
MCHC RBC AUTO-ENTMCNC: 32.2 % (ref 32–36)
MCV RBC AUTO: 95.9 FL (ref 78–100)
MONOCYTES ABSOLUTE: 0.9 K/CU MM
MONOCYTES RELATIVE PERCENT: 11.5 % (ref 0–4)
NUCLEATED RBC %: 1.4 %
PDW BLD-RTO: 13.1 % (ref 11.7–14.9)
PLATELET # BLD: 243 K/CU MM (ref 140–440)
PMV BLD AUTO: 10.7 FL (ref 7.5–11.1)
RBC # BLD: 5.6 M/CU MM (ref 4.6–6.2)
SEGMENTED NEUTROPHILS ABSOLUTE COUNT: 5.4 K/CU MM
SEGMENTED NEUTROPHILS RELATIVE PERCENT: 67 % (ref 36–66)
TOTAL IMMATURE NEUTOROPHIL: 0.03 K/CU MM
TOTAL NUCLEATED RBC: 0.1 K/CU MM
WBC # BLD: 8.1 K/CU MM (ref 4–10.5)

## 2019-12-22 PROCEDURE — 6370000000 HC RX 637 (ALT 250 FOR IP): Performed by: NURSE PRACTITIONER

## 2019-12-22 PROCEDURE — 6370000000 HC RX 637 (ALT 250 FOR IP): Performed by: PSYCHIATRY & NEUROLOGY

## 2019-12-22 PROCEDURE — 6360000002 HC RX W HCPCS: Performed by: NURSE PRACTITIONER

## 2019-12-22 PROCEDURE — 94761 N-INVAS EAR/PLS OXIMETRY MLT: CPT

## 2019-12-22 PROCEDURE — 82962 GLUCOSE BLOOD TEST: CPT

## 2019-12-22 PROCEDURE — 70551 MRI BRAIN STEM W/O DYE: CPT

## 2019-12-22 PROCEDURE — 85025 COMPLETE CBC W/AUTO DIFF WBC: CPT

## 2019-12-22 PROCEDURE — 36415 COLL VENOUS BLD VENIPUNCTURE: CPT

## 2019-12-22 PROCEDURE — 6370000000 HC RX 637 (ALT 250 FOR IP): Performed by: STUDENT IN AN ORGANIZED HEALTH CARE EDUCATION/TRAINING PROGRAM

## 2019-12-22 RX ORDER — DIAZEPAM 5 MG/1
5 TABLET ORAL ONCE
Status: COMPLETED | OUTPATIENT
Start: 2019-12-22 | End: 2019-12-22

## 2019-12-22 RX ADMIN — DOCUSATE SODIUM 200 MG: 100 CAPSULE, LIQUID FILLED ORAL at 11:14

## 2019-12-22 RX ADMIN — MIDODRINE HYDROCHLORIDE 5 MG: 5 TABLET ORAL at 11:15

## 2019-12-22 RX ADMIN — ENOXAPARIN SODIUM 40 MG: 40 INJECTION SUBCUTANEOUS at 11:16

## 2019-12-22 RX ADMIN — OLANZAPINE 7.5 MG: 5 TABLET, FILM COATED ORAL at 11:16

## 2019-12-22 RX ADMIN — METOPROLOL TARTRATE 25 MG: 25 TABLET ORAL at 11:15

## 2019-12-22 RX ADMIN — ASPIRIN AND DIPYRIDAMOLE 1 CAPSULE: 25; 200 CAPSULE, EXTENDED RELEASE ORAL at 11:16

## 2019-12-22 RX ADMIN — ASPIRIN 81 MG: 81 TABLET, COATED ORAL at 11:14

## 2019-12-22 RX ADMIN — DIAZEPAM 5 MG: 5 TABLET ORAL at 10:28

## 2019-12-22 RX ADMIN — DIAZEPAM 5 MG: 5 TABLET ORAL at 12:10

## 2019-12-22 RX ADMIN — ATORVASTATIN CALCIUM 80 MG: 40 TABLET, FILM COATED ORAL at 11:14

## 2019-12-22 RX ADMIN — LACOSAMIDE 200 MG: 100 TABLET, FILM COATED ORAL at 11:16

## 2019-12-22 RX ADMIN — LAMOTRIGINE 250 MG: 100 TABLET ORAL at 11:15

## 2019-12-22 RX ADMIN — DULOXETINE HYDROCHLORIDE 30 MG: 30 CAPSULE, DELAYED RELEASE ORAL at 11:14

## 2019-12-22 RX ADMIN — LISINOPRIL 10 MG: 10 TABLET ORAL at 11:14

## 2019-12-22 ASSESSMENT — PAIN SCALES - GENERAL
PAINLEVEL_OUTOF10: 0
PAINLEVEL_OUTOF10: 0

## 2019-12-23 ENCOUNTER — TELEPHONE (OUTPATIENT)
Dept: FAMILY MEDICINE CLINIC | Age: 64
End: 2019-12-23

## 2019-12-27 LAB
EKG ATRIAL RATE: 102 BPM
EKG DIAGNOSIS: NORMAL
EKG P AXIS: 46 DEGREES
EKG P-R INTERVAL: 178 MS
EKG Q-T INTERVAL: 318 MS
EKG QRS DURATION: 80 MS
EKG QTC CALCULATION (BAZETT): 414 MS
EKG R AXIS: 56 DEGREES
EKG T AXIS: 63 DEGREES
EKG VENTRICULAR RATE: 102 BPM

## 2020-01-07 ENCOUNTER — APPOINTMENT (OUTPATIENT)
Dept: GENERAL RADIOLOGY | Age: 65
End: 2020-01-07
Payer: MEDICARE

## 2020-01-07 ENCOUNTER — HOSPITAL ENCOUNTER (EMERGENCY)
Age: 65
Discharge: HOME | End: 2020-01-07
Attending: EMERGENCY MEDICINE
Payer: MEDICARE

## 2020-01-07 ENCOUNTER — APPOINTMENT (OUTPATIENT)
Dept: CT IMAGING | Age: 65
End: 2020-01-07
Payer: MEDICARE

## 2020-01-07 VITALS
RESPIRATION RATE: 18 BRPM | WEIGHT: 204 LBS | HEART RATE: 81 BPM | HEIGHT: 72 IN | OXYGEN SATURATION: 98 % | SYSTOLIC BLOOD PRESSURE: 127 MMHG | BODY MASS INDEX: 27.63 KG/M2 | DIASTOLIC BLOOD PRESSURE: 78 MMHG | TEMPERATURE: 98.2 F

## 2020-01-07 PROCEDURE — 99284 EMERGENCY DEPT VISIT MOD MDM: CPT

## 2020-01-07 PROCEDURE — 72170 X-RAY EXAM OF PELVIS: CPT

## 2020-01-07 PROCEDURE — 70450 CT HEAD/BRAIN W/O DYE: CPT

## 2020-01-07 PROCEDURE — 72125 CT NECK SPINE W/O DYE: CPT

## 2020-01-07 PROCEDURE — 71045 X-RAY EXAM CHEST 1 VIEW: CPT

## 2020-01-07 ASSESSMENT — ENCOUNTER SYMPTOMS
COUGH: 0
NAUSEA: 0
SHORTNESS OF BREATH: 0
SORE THROAT: 0
BACK PAIN: 0
EYE PAIN: 0
VOMITING: 0
EYE DISCHARGE: 0
RHINORRHEA: 0
ABDOMINAL PAIN: 0

## 2020-01-07 ASSESSMENT — PAIN SCALES - GENERAL: PAINLEVEL_OUTOF10: 0

## 2020-01-07 ASSESSMENT — PAIN DESCRIPTION - LOCATION: LOCATION: HEAD

## 2020-01-07 ASSESSMENT — PAIN DESCRIPTION - PAIN TYPE: TYPE: ACUTE PAIN

## 2020-01-07 NOTE — LETTER
[facility] Emergency Department  [address] [place] [id number]  Phone: [phone]  Fax: [phone]             January 7, 2020    Patient: Dinah Cosby   YOB: 1955   Date of Visit: 1/7/2020       To Whom It May Concern:    Dinah Cosby was seen and treated in our emergency department on 1/7/2020. He may return to work on 01/08/2020.       Sincerely,             Signature:__________________________________

## 2020-01-07 NOTE — ED PROVIDER NOTES
[facility]      [place] Name: Rand Mcintyre  MRN: [id number]  [place] 1955  Date of evaluation: 1/7/2020  Provider: Lurdes Murrieta MD    CHIEF COMPLAINT       Chief Complaint   Patient presents with    Fall     hit the head happened 10am, had an episode of seizure dt hx seizures,          HISTORY OF PRESENT ILLNESS      Rand Mcintyre is a 60 y.o. male who presents to the emergency department  for   Chief Complaint   Patient presents with    Fall     hit the head happened 10am, had an episode of seizure dt hx seizures,        60 yom with h/o gait instability, seizure disorder presents after a fall. He does use a gait belt and assisted walking devices at baseline. He does present to the emergency department with a caretaker. Reportedly he was going to hang up a coat when a mechanical fall and fell to the ground. He did strike his head. He did reportedly have a short seizure after the fall. He does have a history of seizure disorder and is on multiple medications. He does endorse compliance with his medications. In the emergency department, he endorses a bit of posterior head pain. Denies any neck pain. He has ambulated since the fall. He is alert and oriented answer questions appropriately. He does present to the emergency department with his caretaker states that he is at his neurological baseline. No chest pain or abdominal pain. No cough, shortness of breath or sputum production. Nursing Notes, Triage Notes & Vital Signs were reviewed. REVIEW OF SYSTEMS    (2-9 systems for level 4, 10 or more for level 5)     Review of Systems   Constitutional: Negative for chills and fever. HENT: Negative for congestion, rhinorrhea and sore throat. Eyes: Negative for pain and discharge. Respiratory: Negative for cough and shortness of breath.     Cardiovascular: Negative for chest pain and palpitations. Gastrointestinal: Negative for abdominal pain, nausea and vomiting. Endocrine: Negative for polydipsia and polyuria. Genitourinary: Negative for dysuria and flank pain. Musculoskeletal: Negative for back pain and neck pain. Skin: Negative for pallor and wound. Neurological: Positive for headaches. Psychiatric/Behavioral: Negative for confusion. Except as noted above the remainder of the review of systems was reviewed and negative. PAST MEDICAL HISTORY     Past Medical History:   Diagnosis Date    Avascular necrosis of bone (HCC)     right hip    CAD (coronary artery disease)     Coronary atherosclerosis     Dementia     Dementia (HCC)     Dizziness     cerebelar atrophy    Environmental allergies     Essential hypertension     H/O Doppler ultrasound 07/11/2016    carotid - normal    IBS (irritable bowel syndrome)     Mental disability     Recurrent falls     S/P PTCA (percutaneous transluminal coronary angioplasty) 06/16/2016    Cx OM stent    Seizure disorder (HCC)     Urge incontinence of urine     Wrist joint pain     chronic left       Prior to Admission medications    Medication Sig Start Date End Date Taking?  Authorizing Provider   DULoxetine (CYMBALTA) 30 MG extended release capsule Take 1 capsule by mouth daily 12/21/19   Chiquis Denson DO   lisinopril (PRINIVIL;ZESTRIL) 10 MG tablet Take 1 tablet by mouth daily 12/21/19   Chiquis Denson DO   donepezil (ARICEPT) 10 MG tablet Take 1 tablet by mouth nightly 12/21/19   Allyson Murillo DO   aspirin-dipyridamole (AGGRENOX)  MG per extended release capsule Take 1 capsule by mouth daily    Historical Provider, MD   midodrine (PROAMATINE) 5 MG tablet Take 5 mg by mouth 2 times daily    Historical Provider, MD   ranitidine (RANITIDINE 150 MAX STRENGTH) 150 MG tablet Take 150 mg by mouth 2 times daily    Historical Provider, MD   acetaminophen (TYLENOL) 500 MG tablet Take 1,000 mg by mouth every 4 morning Historical Med             ALLERGIES     Patient has no known allergies. FAMILY HISTORY       Family History   Problem Relation Age of Onset   Ray Brewster Cancer Mother     No Known Problems Father         strange to father    Cancer Sister           SOCIAL HISTORY       Social History     Socioeconomic History    Marital status: Single     Spouse name: None    Number of children: None    Years of education: None    Highest education level: None   Occupational History    None   Social Needs    Financial resource strain: None    Food insecurity:     Worry: None     Inability: None    Transportation needs:     Medical: None     Non-medical: None   Tobacco Use    Smoking status: Never Smoker    Smokeless tobacco: Never Used   Substance and Sexual Activity    Alcohol use: No    Drug use: No    Sexual activity: Not Currently     Partners: Female   Lifestyle    Physical activity:     Days per week: None     Minutes per session: None    Stress: None   Relationships    Social connections:     Talks on phone: None     Gets together: None     Attends Baptist service: None     Active member of club or organization: None     Attends meetings of clubs or organizations: None     Relationship status: None    Intimate partner violence:     Fear of current or ex partner: None     Emotionally abused: None     Physically abused: None     Forced sexual activity: None   Other Topics Concern    None   Social History Narrative    None       SCREENINGS    [place] Coma Scale  Eye Opening: Spontaneous  Best Verbal Response: Oriented  Best Motor Response: Obeys commands  [place] Coma Scale Score: 15          PHYSICAL EXAM    (up to 7 for level 4, 8 or more for level 5)     ED Triage Vitals [01/07/20 1042]   BP Temp Temp Source Pulse Resp SpO2 Height Weight   127/78 98.2 °F (36.8 °C) Oral 81 18 98 % 6' (1.829 m) 204 lb (92.5 kg)       Physical Exam  Vitals signs reviewed.    Constitutional:       Appearance: He is not ill-appearing or toxic-appearing. HENT:      Head: Normocephalic and atraumatic. Nose: Nose normal. No congestion or rhinorrhea. Mouth/Throat:      Mouth: Mucous membranes are moist.      Pharynx: No oropharyngeal exudate or posterior oropharyngeal erythema. Eyes:      General:         Right eye: No discharge. Left eye: No discharge. Extraocular Movements: Extraocular movements intact. Pupils: Pupils are equal, round, and reactive to light. Neck:      Musculoskeletal: Normal range of motion. No neck rigidity. Cardiovascular:      Rate and Rhythm: Normal rate. Heart sounds: No friction rub. No gallop. Pulmonary:      Comments: Lungs CTAB  No retractions, no increased work of breathing  Abdominal:      Palpations: Abdomen is soft. Tenderness: There is no tenderness. There is no guarding. Comments: Abdomen soft, non-tender, non-peritoneal  No guarding on abdominal exam   Musculoskeletal:         General: No tenderness or signs of injury. Right lower leg: No edema. Left lower leg: No edema. Comments: Pelvis stable  No extremity deformity   Lymphadenopathy:      Cervical: No cervical adenopathy. Skin:     General: Skin is warm. Findings: No erythema or rash. Neurological:      Mental Status: He is alert. Mental status is at baseline. Comments: Speech clear, face symmetric, moving all extremities spontaneously, following commands         DIAGNOSTIC RESULTS     Labs Reviewed - No data to display         RADIOLOGY:     Non-plain film images such as CT, Ultrasound and MRI are read by the radiologist. Plain radiographic images are visualized and preliminarily interpreted by the emergency physician. Interpretation per the Radiologist below, if available at the time of this note:    XR PELVIS (1-2 VIEWS)   Final Result   No acute osseous abnormality in the pelvis. Postsurgical changes of bilateral hip arthroplasties.       Mild is discharged home in stable condition. CONSULTS:  None    PROCEDURES:  None performed unless otherwise noted below     Procedures        FINAL IMPRESSION      1. Fall, initial encounter          DISPOSITION/PLAN   DISPOSITION Decision To Discharge 01/07/2020 12:32:36 PM      PATIENT REFERRED TO:  Varghese Dominguez MD  [facility]  [phone]    Schedule an appointment as soon as possible for a visit         DISCHARGE MEDICATIONS:  Discharge Medication List as of 1/7/2020 12:39 PM          ED Provider Disposition Time  DISPOSITION Decision To Discharge 01/07/2020 12:32:36 PM      The Patient was instructed to read the package inserts with any medication that was prescribed. Major potential reactions and medication interactions were discussed. The Patient understands that there are numerous possible adverse reactions not covered. The patient was also instructed to arrange follow-up with his or her primary care provider for review of any pending labwork or incidental findings on any radiology results that were obtained. All efforts were made to discuss any incidental findings that require further monitoring. Controlled Substances Monitoring:     No flowsheet data found.     (Please note that portions of this note were completed with a voice recognition program.  Efforts were made to edit the dictations but occasionally words are mis-transcribed.)    Rina Madrid MD (electronically signed)  Attending Emergency Physician          Rina Madrid MD  01/07/20 2984

## 2020-01-07 NOTE — ED NOTES
Verbal and written discharge instructions given. Patient and caregiver verbalized understanding and denies further questions or concerns.      Timmy Mcgraw RN  01/07/20 5447

## 2020-01-07 NOTE — ED TRIAGE NOTES
Pt to ed with squad from work, had an episode of fall and states that hit the back of his head and had brief about 1 min of seizure per people's information from the work @ AROUND 10 AM this morning. Pt denies LOC, headaches, dizziness, confusion at this time. Denies taking anticoagulants. Per EMS Pt has hx of seizures which lasts about 8 min and takes meds for seizure. Also pt uses walker and has unstable balance for the baseline per home manager.

## 2020-01-09 RX ORDER — LACOSAMIDE 200 MG/1
TABLET, FILM COATED ORAL
Qty: 60 TABLET | Refills: 2 | Status: ON HOLD | OUTPATIENT
Start: 2020-01-09 | End: 2020-02-25 | Stop reason: HOSPADM

## 2020-01-10 ENCOUNTER — HOSPITAL ENCOUNTER (OUTPATIENT)
Age: 65
Setting detail: SPECIMEN
Discharge: HOME | End: 2020-01-10
Payer: MEDICARE

## 2020-01-10 LAB
ALBUMIN SERPL-MCNC: 4.8 GM/DL (ref 3.4–5)
ALP BLD-CCNC: 49 IU/L (ref 40–129)
ALT SERPL-CCNC: 66 U/L (ref 10–40)
ANION GAP SERPL CALCULATED.3IONS-SCNC: 13 MMOL/L (ref 4–16)
AST SERPL-CCNC: 40 IU/L (ref 15–37)
BILIRUB SERPL-MCNC: 0.4 MG/DL (ref 0–1)
BUN BLDV-MCNC: 8 MG/DL (ref 6–23)
CALCIUM SERPL-MCNC: 9.7 MG/DL (ref 8.3–10.6)
CHLORIDE BLD-SCNC: 97 MMOL/L (ref 99–110)
CO2: 26 MMOL/L (ref 21–32)
CREAT SERPL-MCNC: 1 MG/DL (ref 0.9–1.3)
GFR AFRICAN AMERICAN: >60 ML/MIN/1.73M2
GFR NON-AFRICAN AMERICAN: >60 ML/MIN/1.73M2
GLUCOSE BLD-MCNC: 117 MG/DL (ref 70–99)
POTASSIUM SERPL-SCNC: 4.8 MMOL/L (ref 3.5–5.1)
SODIUM BLD-SCNC: 136 MMOL/L (ref 135–145)
TOTAL PROTEIN: 7.1 GM/DL (ref 6.4–8.2)

## 2020-01-10 PROCEDURE — 80053 COMPREHEN METABOLIC PANEL: CPT

## 2020-01-13 ENCOUNTER — OFFICE VISIT (OUTPATIENT)
Dept: FAMILY MEDICINE CLINIC | Age: 65
End: 2020-01-13
Payer: MEDICARE

## 2020-01-13 VITALS
OXYGEN SATURATION: 98 % | HEIGHT: 71 IN | HEART RATE: 90 BPM | DIASTOLIC BLOOD PRESSURE: 72 MMHG | BODY MASS INDEX: 29.26 KG/M2 | SYSTOLIC BLOOD PRESSURE: 122 MMHG | WEIGHT: 209 LBS

## 2020-01-13 DIAGNOSIS — G93.41 ACUTE METABOLIC ENCEPHALOPATHY: ICD-10-CM

## 2020-01-13 LAB
A/G RATIO: 1.8 (ref 1.1–2.2)
ALBUMIN SERPL-MCNC: 4.4 G/DL (ref 3.4–5)
ALP BLD-CCNC: 40 U/L (ref 40–129)
ALT SERPL-CCNC: 55 U/L (ref 10–40)
ANION GAP SERPL CALCULATED.3IONS-SCNC: 16 MMOL/L (ref 3–16)
AST SERPL-CCNC: 33 U/L (ref 15–37)
BASOPHILS ABSOLUTE: 0 K/UL (ref 0–0.2)
BASOPHILS RELATIVE PERCENT: 0.4 %
BILIRUB SERPL-MCNC: <0.2 MG/DL (ref 0–1)
BUN BLDV-MCNC: 8 MG/DL (ref 7–20)
CALCIUM SERPL-MCNC: 9.5 MG/DL (ref 8.3–10.6)
CHLORIDE BLD-SCNC: 99 MMOL/L (ref 99–110)
CO2: 25 MMOL/L (ref 21–32)
CREAT SERPL-MCNC: 1 MG/DL (ref 0.8–1.3)
EOSINOPHILS ABSOLUTE: 0 K/UL (ref 0–0.6)
EOSINOPHILS RELATIVE PERCENT: 0.3 %
GFR AFRICAN AMERICAN: >60
GFR NON-AFRICAN AMERICAN: >60
GLOBULIN: 2.4 G/DL
GLUCOSE BLD-MCNC: 111 MG/DL (ref 70–99)
HCT VFR BLD CALC: 43.1 % (ref 40.5–52.5)
HEMOGLOBIN: 14.8 G/DL (ref 13.5–17.5)
LYMPHOCYTES ABSOLUTE: 1.6 K/UL (ref 1–5.1)
LYMPHOCYTES RELATIVE PERCENT: 35.1 %
MCH RBC QN AUTO: 32.2 PG (ref 26–34)
MCHC RBC AUTO-ENTMCNC: 34.2 G/DL (ref 31–36)
MCV RBC AUTO: 94 FL (ref 80–100)
MONOCYTES ABSOLUTE: 0.5 K/UL (ref 0–1.3)
MONOCYTES RELATIVE PERCENT: 11.2 %
NEUTROPHILS ABSOLUTE: 2.4 K/UL (ref 1.7–7.7)
NEUTROPHILS RELATIVE PERCENT: 53 %
PDW BLD-RTO: 13.3 % (ref 12.4–15.4)
PLATELET # BLD: 224 K/UL (ref 135–450)
PMV BLD AUTO: 9.5 FL (ref 5–10.5)
POTASSIUM SERPL-SCNC: 4.3 MMOL/L (ref 3.5–5.1)
RBC # BLD: 4.59 M/UL (ref 4.2–5.9)
SODIUM BLD-SCNC: 140 MMOL/L (ref 136–145)
TOTAL PROTEIN: 6.8 G/DL (ref 6.4–8.2)
WBC # BLD: 4.5 K/UL (ref 4–11)

## 2020-01-13 PROCEDURE — G8427 DOCREV CUR MEDS BY ELIG CLIN: HCPCS | Performed by: FAMILY MEDICINE

## 2020-01-13 PROCEDURE — G8482 FLU IMMUNIZE ORDER/ADMIN: HCPCS | Performed by: FAMILY MEDICINE

## 2020-01-13 PROCEDURE — 3017F COLORECTAL CA SCREEN DOC REV: CPT | Performed by: FAMILY MEDICINE

## 2020-01-13 PROCEDURE — G8417 CALC BMI ABV UP PARAM F/U: HCPCS | Performed by: FAMILY MEDICINE

## 2020-01-13 PROCEDURE — 1036F TOBACCO NON-USER: CPT | Performed by: FAMILY MEDICINE

## 2020-01-13 PROCEDURE — 99214 OFFICE O/P EST MOD 30 MIN: CPT | Performed by: FAMILY MEDICINE

## 2020-01-13 PROCEDURE — 1111F DSCHRG MED/CURRENT MED MERGE: CPT | Performed by: FAMILY MEDICINE

## 2020-01-13 RX ORDER — NAPROXEN 500 MG/1
500 TABLET ORAL DAILY
Status: ON HOLD | COMMUNITY
End: 2020-01-31 | Stop reason: HOSPADM

## 2020-01-13 RX ORDER — TAMSULOSIN HYDROCHLORIDE 0.4 MG/1
0.4 CAPSULE ORAL DAILY
COMMUNITY
End: 2020-01-28

## 2020-01-13 RX ORDER — ARIPIPRAZOLE 20 MG/1
20 TABLET ORAL DAILY
COMMUNITY
End: 2020-01-28

## 2020-01-13 RX ORDER — CETIRIZINE HYDROCHLORIDE 10 MG/1
10 TABLET ORAL DAILY
COMMUNITY
End: 2020-01-28

## 2020-01-13 RX ORDER — POTASSIUM CHLORIDE 1.5 G/1.77G
20 POWDER, FOR SOLUTION ORAL 2 TIMES DAILY
COMMUNITY
End: 2020-01-28

## 2020-01-13 RX ORDER — PRAVASTATIN SODIUM 10 MG
10 TABLET ORAL DAILY
COMMUNITY
End: 2020-01-28

## 2020-01-13 RX ORDER — PHENYTOIN SODIUM 100 MG/1
100 CAPSULE, EXTENDED RELEASE ORAL 2 TIMES DAILY
COMMUNITY
End: 2020-01-28

## 2020-01-13 RX ORDER — DIVALPROEX SODIUM 500 MG/1
500 TABLET, EXTENDED RELEASE ORAL 2 TIMES DAILY
COMMUNITY
End: 2020-01-28

## 2020-01-13 RX ORDER — LANOLIN ALCOHOL/MO/W.PET/CERES
3 CREAM (GRAM) TOPICAL DAILY
COMMUNITY

## 2020-01-13 RX ORDER — METOPROLOL SUCCINATE 100 MG/1
100 TABLET, EXTENDED RELEASE ORAL DAILY
COMMUNITY
End: 2020-01-28

## 2020-01-13 RX ORDER — CLONAZEPAM 1 MG/1
1 TABLET ORAL 2 TIMES DAILY PRN
COMMUNITY
End: 2020-01-28

## 2020-01-13 RX ORDER — LEVOTHYROXINE SODIUM 0.07 MG/1
75 TABLET ORAL DAILY
COMMUNITY
End: 2020-01-28

## 2020-01-13 ASSESSMENT — ENCOUNTER SYMPTOMS
COUGH: 0
SHORTNESS OF BREATH: 0

## 2020-01-13 NOTE — PROGRESS NOTES
1/13/2020     Mark Preston is a 60 y.o. male who presents today with:  Chief Complaint   Patient presents with    Follow-Up from Hospital     f/u 1/7/20-1/13/20 [facility] ed patient had a fall at work hit the back of his head had a one minute seizure pt has a hx of seizures . patient is unstable on his feet and uses a walker. patient's caregiver states they didn't adjust his medications or do any testing . patients caregiver is stating the patient has had numerous falls since his hospital stay at [address] [place] [address],[address] in december. caregiver. current pharm is chris rush    . /72 (Site: Right Upper Arm, Position: Sitting, Cuff Size: Large Adult)   Pulse 90   Ht 5' 11\" (1.803 m)   Wt 209 lb (94.8 kg)   SpO2 98%   BMI 29.15 kg/m²     HPI  History was obtained from the pt and aid. He was in the hospital in December for [facility]. He was admitted to the hospital and was treated for encephalopathy, HTN, and low blood sugar. He was discharged home with [facility]. He was supposed to follow up with Neurology, and that was not conveyed to the aide. He has not followed up with them yet. They are not sure what medications he is supposed to be on, as his Aricept, lisinopril, and Cymbalta had been held since his hospitalization as they did not know what Dr. Cooper Junior wanted restarted. Pt was in the ED on 1/7/20 s/p seizure and fall. Aide notes that the pt had a seizure and then fell and hit his head. [facility] Aide states that he needs almost constant attention. Aide notes that the pt has 3 mediations that were prescribed, but that he has not been given, including Aricept. He is not sleeping well at night and is up and down all night. He is on lamictal and vimpat for his seizures. Dr. Jennifer Wagner is retiring and they would like to see a different neurologist that is not Dr. Cash English. Will refer to [facility] for neurological disorders for further evaluation and management of his seizures.     Vanda Flores was evaluated today and a DME order None    Transportation needs:     Medical: None     Non-medical: None   Tobacco Use    Smoking status: Never Smoker    Smokeless tobacco: Never Used   Substance and Sexual Activity    Alcohol use: No    Drug use: No    Sexual activity: Not Currently     Partners: Female   Lifestyle    Physical activity:     Days per week: None     Minutes per session: None    Stress: None   Relationships    Social connections:     Talks on phone: None     Gets together: None     Attends Taoist service: None     Active member of club or organization: None     Attends meetings of clubs or organizations: None     Relationship status: None    Intimate partner violence:     Fear of current or ex partner: None     Emotionally abused: None     Physically abused: None     Forced sexual activity: None   Other Topics Concern    None   Social History Narrative    None        SURGICAL HISTORY  Past Surgical History:   Procedure Laterality Date    CARDIAC SURGERY      JOINT REPLACEMENT      hip replacement left    ORIF FEMUR DECOMPRESSION  06/2007    left     PTCA  06/16/2016    Cx OM stent       CURRENT MEDICATIONS  Current Outpatient Medications   Medication Sig Dispense Refill    ARIPiprazole (ABILIFY) 20 MG tablet Take 20 mg by mouth daily      cetirizine (ZYRTEC) 10 MG tablet Take 10 mg by mouth daily      clonazePAM (KLONOPIN) 1 MG tablet Take 1 mg by mouth 2 times daily as needed.       divalproex (DEPAKOTE ER) 500 MG extended release tablet Take 500 mg by mouth 2 times daily      levothyroxine (SYNTHROID) 75 MCG tablet Take 75 mcg by mouth Daily      melatonin 3 MG TABS tablet Take 3 mg by mouth daily      metoprolol succinate (TOPROL XL) 100 MG extended release tablet Take 100 mg by mouth daily      naproxen (NAPROSYN) 500 MG tablet Take 500 mg by mouth daily      phenytoin (DILANTIN) 100 MG ER capsule Take 100 mg by mouth 2 times daily      potassium chloride (KLOR-CON) 20 MEQ packet Take 20 mEq by mouth 2 times daily      sertraline (ZOLOFT) 50 MG tablet Take 50 mg by mouth 2 times daily      tamsulosin (FLOMAX) 0.4 MG capsule Take 0.4 mg by mouth daily      pravastatin (PRAVACHOL) 10 MG tablet Take 10 mg by mouth daily      VIMPAT 200 MG tablet TAKE ONE (1) TABLET BY MOUTH TWO TIMES DAILY 60 tablet 2    DULoxetine (CYMBALTA) 30 MG extended release capsule Take 1 capsule by mouth daily 30 capsule 0    lisinopril (PRINIVIL;ZESTRIL) 10 MG tablet Take 1 tablet by mouth daily 30 tablet 0    donepezil (ARICEPT) 10 MG tablet Take 1 tablet by mouth nightly 30 tablet 0    aspirin-dipyridamole (AGGRENOX)  MG per extended release capsule Take 1 capsule by mouth daily      midodrine (PROAMATINE) 5 MG tablet Take 5 mg by mouth 2 times daily      ranitidine (RANITIDINE 150 MAX STRENGTH) 150 MG tablet Take 150 mg by mouth 2 times daily      acetaminophen (TYLENOL) 500 MG tablet Take 1,000 mg by mouth every 4 hours as needed for Pain or Fever      lamoTRIgine (LAMICTAL) 100 MG tablet Take 250 mg by mouth 2 times daily      metFORMIN (GLUCOPHAGE) 500 MG tablet Take 2 tablets by mouth 2 times daily (with meals) 120 tablet 3    ibuprofen (ADVIL;MOTRIN) 200 MG tablet Take 200 mg by mouth every 6 hours as needed for Pain      aspirin 81 MG tablet Take 81 mg by mouth daily      loperamide (IMODIUM) 2 MG capsule Take 2 mg by mouth every 6 hours as needed for Diarrhea       docusate sodium (COLACE) 100 MG capsule Take 200 mg by mouth daily       atorvastatin (LIPITOR) 80 MG tablet Take 1 tablet by mouth daily 90 tablet 3    OLANZapine (ZYPREXA) 7.5 MG tablet Take 7.5 mg by mouth every morning        No current facility-administered medications for this visit. ALLERGIES  No Known Allergies    PHYSICAL EXAM    Physical Exam  Vitals signs and nursing note reviewed. Constitutional:       General: He is not in acute distress. Appearance: He is well-developed. He is not diaphoretic.    HENT:      Head: have lab work completed today and medication dosages may be changed based on the lab results. Patient will be updated on lab results once they are completed and notified of any medication changes if necessary. Patient will be referred to [facility] for neurological disorders because his neurologist is retiring.  - Urine Culture; Future  - Urinalysis with Microscopic  - External Referral To Neurology    They are to bring the patient's blood pressures with them in a log to his next appointment so we can address whether he needs to be restarted on lisinopril or not. Is to call if his blood pressures are elevated over 130/80 in the meantime we will restart sooner if necessary. Patient is to follow-up in approximately 4 weeks, unless otherwise needed in that time. Pt is to call with any questions, concerns, or increase in symptoms. Pt verbalized understanding of and agreement with current plan of care. Electronically signed by YOSVANY Arenas on 1/13/2020      Please note that this chart was generated using dragon dictation software. Although every effort was made to ensure the accuracy of this automated transcription, some errors in transcription may have occurred.

## 2020-01-14 ENCOUNTER — TELEPHONE (OUTPATIENT)
Dept: FAMILY MEDICINE CLINIC | Age: 65
End: 2020-01-14

## 2020-01-14 NOTE — TELEPHONE ENCOUNTER
Spoke with [facility] pharmacist at 242pm regard. Message below. Pharmacist needs the packs back from the patient's home as soon as the receive those back they will be happy to fill his meds.   Electronically signed by Bobby Felipe LPN on 7/27/6999 at 7:86 PM

## 2020-01-14 NOTE — TELEPHONE ENCOUNTER
Spoke with zohra womack's care giver at 246pm regard. Message below per Kathy Cooper at [facility] . Zohra voiced understanding and is taking care of his medication refills.   Electronically signed by Rishabh Wilde LPN on 0/52/9688 at 7:79 PM

## 2020-01-28 ENCOUNTER — APPOINTMENT (OUTPATIENT)
Dept: CT IMAGING | Age: 65
DRG: 093 | End: 2020-01-28
Payer: MEDICARE

## 2020-01-28 ENCOUNTER — APPOINTMENT (OUTPATIENT)
Dept: GENERAL RADIOLOGY | Age: 65
DRG: 093 | End: 2020-01-28
Payer: MEDICARE

## 2020-01-28 ENCOUNTER — HOSPITAL ENCOUNTER (INPATIENT)
Age: 65
LOS: 3 days | Discharge: NURSING, REHAB OR LONG-TERM CARE | DRG: 093 | End: 2020-01-31
Attending: EMERGENCY MEDICINE | Admitting: INTERNAL MEDICINE
Payer: MEDICARE

## 2020-01-28 ENCOUNTER — APPOINTMENT (OUTPATIENT)
Dept: MRI IMAGING | Age: 65
DRG: 093 | End: 2020-01-28
Payer: MEDICARE

## 2020-01-28 LAB
ACETAMINOPHEN LEVEL: <5 UG/ML (ref 15–30)
ALBUMIN SERPL-MCNC: 4.6 GM/DL (ref 3.4–5)
ALCOHOL SCREEN SERUM: NORMAL %WT/VOL
ALP BLD-CCNC: 45 IU/L (ref 40–129)
ALT SERPL-CCNC: 62 U/L (ref 10–40)
AMPHETAMINES: NEGATIVE
ANION GAP SERPL CALCULATED.3IONS-SCNC: 15 MMOL/L (ref 4–16)
APTT: 24.2 SECONDS (ref 25.1–37.1)
AST SERPL-CCNC: 35 IU/L (ref 15–37)
BACTERIA: ABNORMAL /HPF
BARBITURATE SCREEN URINE: NEGATIVE
BASE EXCESS MIXED: ABNORMAL (ref 0–1.2)
BASOPHILS ABSOLUTE: 0 K/CU MM
BASOPHILS RELATIVE PERCENT: 0.2 % (ref 0–1)
BENZODIAZEPINE SCREEN, URINE: NEGATIVE
BILIRUB SERPL-MCNC: 0.5 MG/DL (ref 0–1)
BILIRUBIN URINE: NEGATIVE MG/DL
BLOOD, URINE: NEGATIVE
BUN BLDV-MCNC: 7 MG/DL (ref 6–23)
CALCIUM SERPL-MCNC: 9.7 MG/DL (ref 8.3–10.6)
CANNABINOID SCREEN URINE: NEGATIVE
CHLORIDE BLD-SCNC: 96 MMOL/L (ref 99–110)
CLARITY: ABNORMAL
CO2: 24 MMOL/L (ref 21–32)
COCAINE METABOLITE: NEGATIVE
COLOR: YELLOW
COMMENT: ABNORMAL
CREAT SERPL-MCNC: 0.8 MG/DL (ref 0.9–1.3)
DIFFERENTIAL TYPE: ABNORMAL
DOSE AMOUNT: ABNORMAL
DOSE TIME: ABNORMAL
EOSINOPHILS ABSOLUTE: 0 K/CU MM
EOSINOPHILS RELATIVE PERCENT: 0 % (ref 0–3)
FOLATE: 16 NG/ML (ref 3.1–17.5)
GFR AFRICAN AMERICAN: >60 ML/MIN/1.73M2
GFR NON-AFRICAN AMERICAN: >60 ML/MIN/1.73M2
GLUCOSE BLD-MCNC: 130 MG/DL (ref 70–99)
GLUCOSE BLD-MCNC: 140 MG/DL (ref 70–99)
GLUCOSE BLD-MCNC: 141 MG/DL (ref 70–99)
GLUCOSE, URINE: 50 MG/DL
HCO3 VENOUS: 30.9 MMOL/L (ref 19–25)
HCT VFR BLD CALC: 50.5 % (ref 42–52)
HEMOGLOBIN: 16.9 GM/DL (ref 13.5–18)
HIGH SENSITIVE C-REACTIVE PROTEIN: 3 MG/L
IMMATURE NEUTROPHIL %: 0.2 % (ref 0–0.43)
INR BLD: 1.02 INDEX
KETONES, URINE: ABNORMAL MG/DL
LACTATE: 1.9 MMOL/L (ref 0.4–2)
LACTATE: ABNORMAL MMOL/L (ref 0.4–2)
LEUKOCYTE ESTERASE, URINE: NEGATIVE
LIPASE: 92 IU/L (ref 13–60)
LYMPHOCYTES ABSOLUTE: 1.2 K/CU MM
LYMPHOCYTES RELATIVE PERCENT: 20.6 % (ref 24–44)
MAGNESIUM: 2 MG/DL (ref 1.8–2.4)
MCH RBC QN AUTO: 31 PG (ref 27–31)
MCHC RBC AUTO-ENTMCNC: 33.5 % (ref 32–36)
MCV RBC AUTO: 92.5 FL (ref 78–100)
MONOCYTES ABSOLUTE: 0.5 K/CU MM
MONOCYTES RELATIVE PERCENT: 8.9 % (ref 0–4)
MUCUS: ABNORMAL HPF
NITRITE URINE, QUANTITATIVE: NEGATIVE
NUCLEATED RBC %: 0 %
O2 SAT, VEN: 65 % (ref 50–70)
OPIATES, URINE: NEGATIVE
OXYCODONE: ABNORMAL
PCO2, VEN: 56 MMHG (ref 38–52)
PDW BLD-RTO: 12.6 % (ref 11.7–14.9)
PH VENOUS: 7.35 (ref 7.32–7.42)
PH, URINE: 5 (ref 5–8)
PHENCYCLIDINE, URINE: ABNORMAL
PHENYTOIN LEVEL: <0.8 UG/ML (ref 10–20)
PLATELET # BLD: 294 K/CU MM (ref 140–440)
PMV BLD AUTO: 10.5 FL (ref 7.5–11.1)
PO2, VEN: 33 MMHG (ref 28–48)
POTASSIUM SERPL-SCNC: 4.5 MMOL/L (ref 3.5–5.1)
PRO-BNP: 126 PG/ML
PROCALCITONIN: 0.08
PROTEIN UA: 100 MG/DL
PROTHROMBIN TIME: 12.4 SECONDS (ref 11.7–14.5)
RBC # BLD: 5.46 M/CU MM (ref 4.6–6.2)
RBC URINE: <1 /HPF (ref 0–3)
REASON FOR REJECTION: NORMAL
REJECTED TEST: NORMAL
REJECTED TEST: NORMAL
SALICYLATE LEVEL: <0.3 MG/DL (ref 15–30)
SEGMENTED NEUTROPHILS ABSOLUTE COUNT: 4 K/CU MM
SEGMENTED NEUTROPHILS RELATIVE PERCENT: 70.1 % (ref 36–66)
SODIUM BLD-SCNC: 135 MMOL/L (ref 135–145)
SPECIFIC GRAVITY UA: 1.02 (ref 1–1.03)
SQUAMOUS EPITHELIAL: 1 /HPF
TOTAL CK: 101 IU/L (ref 38–174)
TOTAL IMMATURE NEUTOROPHIL: 0.01 K/CU MM
TOTAL NUCLEATED RBC: 0 K/CU MM
TOTAL PROTEIN: 7.2 GM/DL (ref 6.4–8.2)
TRANSITIONAL EPITHELIAL: <1 /HPF
TRICHOMONAS: ABNORMAL /HPF
TROPONIN T: <0.01 NG/ML
TSH HIGH SENSITIVITY: 1.5 UIU/ML (ref 0.27–4.2)
UROBILINOGEN, URINE: 1 MG/DL (ref 0.2–1)
VITAMIN B-12: 471.4 PG/ML (ref 211–911)
WBC # BLD: 5.7 K/CU MM (ref 4–10.5)
WBC UA: ABNORMAL /HPF (ref 0–2)

## 2020-01-28 PROCEDURE — 80185 ASSAY OF PHENYTOIN TOTAL: CPT

## 2020-01-28 PROCEDURE — 82607 VITAMIN B-12: CPT

## 2020-01-28 PROCEDURE — 85610 PROTHROMBIN TIME: CPT

## 2020-01-28 PROCEDURE — 6370000000 HC RX 637 (ALT 250 FOR IP): Performed by: INTERNAL MEDICINE

## 2020-01-28 PROCEDURE — 83690 ASSAY OF LIPASE: CPT

## 2020-01-28 PROCEDURE — 70551 MRI BRAIN STEM W/O DYE: CPT

## 2020-01-28 PROCEDURE — 82746 ASSAY OF FOLIC ACID SERUM: CPT

## 2020-01-28 PROCEDURE — G0480 DRUG TEST DEF 1-7 CLASSES: HCPCS

## 2020-01-28 PROCEDURE — 70450 CT HEAD/BRAIN W/O DYE: CPT

## 2020-01-28 PROCEDURE — 1200000000 HC SEMI PRIVATE

## 2020-01-28 PROCEDURE — 92610 EVALUATE SWALLOWING FUNCTION: CPT | Performed by: SPEECH-LANGUAGE PATHOLOGIST

## 2020-01-28 PROCEDURE — 71045 X-RAY EXAM CHEST 1 VIEW: CPT

## 2020-01-28 PROCEDURE — 36415 COLL VENOUS BLD VENIPUNCTURE: CPT

## 2020-01-28 PROCEDURE — 84484 ASSAY OF TROPONIN QUANT: CPT

## 2020-01-28 PROCEDURE — 96374 THER/PROPH/DIAG INJ IV PUSH: CPT

## 2020-01-28 PROCEDURE — 82805 BLOOD GASES W/O2 SATURATION: CPT

## 2020-01-28 PROCEDURE — 82550 ASSAY OF CK (CPK): CPT

## 2020-01-28 PROCEDURE — 74176 CT ABD & PELVIS W/O CONTRAST: CPT

## 2020-01-28 PROCEDURE — 85730 THROMBOPLASTIN TIME PARTIAL: CPT

## 2020-01-28 PROCEDURE — 86141 C-REACTIVE PROTEIN HS: CPT

## 2020-01-28 PROCEDURE — 82962 GLUCOSE BLOOD TEST: CPT

## 2020-01-28 PROCEDURE — 80053 COMPREHEN METABOLIC PANEL: CPT

## 2020-01-28 PROCEDURE — 93005 ELECTROCARDIOGRAM TRACING: CPT | Performed by: EMERGENCY MEDICINE

## 2020-01-28 PROCEDURE — 83880 ASSAY OF NATRIURETIC PEPTIDE: CPT

## 2020-01-28 PROCEDURE — 99285 EMERGENCY DEPT VISIT HI MDM: CPT

## 2020-01-28 PROCEDURE — 80307 DRUG TEST PRSMV CHEM ANLYZR: CPT

## 2020-01-28 PROCEDURE — 83735 ASSAY OF MAGNESIUM: CPT

## 2020-01-28 PROCEDURE — 84443 ASSAY THYROID STIM HORMONE: CPT

## 2020-01-28 PROCEDURE — 85025 COMPLETE CBC W/AUTO DIFF WBC: CPT

## 2020-01-28 PROCEDURE — 81001 URINALYSIS AUTO W/SCOPE: CPT

## 2020-01-28 PROCEDURE — 84145 PROCALCITONIN (PCT): CPT

## 2020-01-28 PROCEDURE — 94761 N-INVAS EAR/PLS OXIMETRY MLT: CPT

## 2020-01-28 PROCEDURE — 83605 ASSAY OF LACTIC ACID: CPT

## 2020-01-28 PROCEDURE — 93010 ELECTROCARDIOGRAM REPORT: CPT | Performed by: INTERNAL MEDICINE

## 2020-01-28 RX ORDER — DIVALPROEX SODIUM 500 MG/1
500 TABLET, EXTENDED RELEASE ORAL 2 TIMES DAILY
Status: DISCONTINUED | OUTPATIENT
Start: 2020-01-28 | End: 2020-01-28

## 2020-01-28 RX ORDER — DEXTROSE MONOHYDRATE 25 G/50ML
12.5 INJECTION, SOLUTION INTRAVENOUS PRN
Status: DISCONTINUED | OUTPATIENT
Start: 2020-01-28 | End: 2020-01-31 | Stop reason: HOSPADM

## 2020-01-28 RX ORDER — ASPIRIN AND DIPYRIDAMOLE 25; 200 MG/1; MG/1
1 CAPSULE, EXTENDED RELEASE ORAL DAILY
Status: DISCONTINUED | OUTPATIENT
Start: 2020-01-28 | End: 2020-01-31 | Stop reason: HOSPADM

## 2020-01-28 RX ORDER — NICOTINE POLACRILEX 4 MG
15 LOZENGE BUCCAL PRN
Status: DISCONTINUED | OUTPATIENT
Start: 2020-01-28 | End: 2020-01-31 | Stop reason: HOSPADM

## 2020-01-28 RX ORDER — LABETALOL 20 MG/4 ML (5 MG/ML) INTRAVENOUS SYRINGE
10 ONCE
Status: DISCONTINUED | OUTPATIENT
Start: 2020-01-28 | End: 2020-01-31 | Stop reason: HOSPADM

## 2020-01-28 RX ORDER — LACOSAMIDE 100 MG/1
200 TABLET ORAL 2 TIMES DAILY
Status: DISCONTINUED | OUTPATIENT
Start: 2020-01-28 | End: 2020-01-31 | Stop reason: HOSPADM

## 2020-01-28 RX ORDER — LEVOTHYROXINE SODIUM 0.07 MG/1
75 TABLET ORAL DAILY
Status: DISCONTINUED | OUTPATIENT
Start: 2020-01-28 | End: 2020-01-31 | Stop reason: HOSPADM

## 2020-01-28 RX ORDER — ATORVASTATIN CALCIUM 40 MG/1
80 TABLET, FILM COATED ORAL DAILY
Status: DISCONTINUED | OUTPATIENT
Start: 2020-01-28 | End: 2020-01-31 | Stop reason: HOSPADM

## 2020-01-28 RX ORDER — TAMSULOSIN HYDROCHLORIDE 0.4 MG/1
0.4 CAPSULE ORAL DAILY
Status: DISCONTINUED | OUTPATIENT
Start: 2020-01-28 | End: 2020-01-31 | Stop reason: HOSPADM

## 2020-01-28 RX ORDER — METOPROLOL SUCCINATE 100 MG/1
100 TABLET, EXTENDED RELEASE ORAL DAILY
Status: DISCONTINUED | OUTPATIENT
Start: 2020-01-28 | End: 2020-01-31 | Stop reason: HOSPADM

## 2020-01-28 RX ORDER — DEXTROSE MONOHYDRATE 50 MG/ML
100 INJECTION, SOLUTION INTRAVENOUS PRN
Status: DISCONTINUED | OUTPATIENT
Start: 2020-01-28 | End: 2020-01-31 | Stop reason: HOSPADM

## 2020-01-28 RX ORDER — LISINOPRIL 10 MG/1
10 TABLET ORAL DAILY
Status: DISCONTINUED | OUTPATIENT
Start: 2020-01-28 | End: 2020-01-31 | Stop reason: HOSPADM

## 2020-01-28 RX ORDER — DULOXETIN HYDROCHLORIDE 30 MG/1
30 CAPSULE, DELAYED RELEASE ORAL DAILY
Status: DISCONTINUED | OUTPATIENT
Start: 2020-01-28 | End: 2020-01-31 | Stop reason: HOSPADM

## 2020-01-28 RX ORDER — LANOLIN ALCOHOL/MO/W.PET/CERES
3 CREAM (GRAM) TOPICAL NIGHTLY
Status: DISCONTINUED | OUTPATIENT
Start: 2020-01-28 | End: 2020-01-31 | Stop reason: HOSPADM

## 2020-01-28 RX ORDER — HYDRALAZINE HYDROCHLORIDE 20 MG/ML
10 INJECTION INTRAMUSCULAR; INTRAVENOUS EVERY 6 HOURS PRN
Status: DISCONTINUED | OUTPATIENT
Start: 2020-01-28 | End: 2020-01-31 | Stop reason: HOSPADM

## 2020-01-28 RX ORDER — FAMOTIDINE 20 MG/1
20 TABLET, FILM COATED ORAL NIGHTLY
Status: ON HOLD | COMMUNITY
End: 2020-02-25 | Stop reason: HOSPADM

## 2020-01-28 RX ADMIN — LACOSAMIDE 200 MG: 100 TABLET, FILM COATED ORAL at 22:15

## 2020-01-28 RX ADMIN — MELATONIN 3 MG: at 22:15

## 2020-01-28 RX ADMIN — LAMOTRIGINE 250 MG: 100 TABLET ORAL at 17:46

## 2020-01-28 RX ADMIN — LEVOTHYROXINE SODIUM 75 MCG: 0.07 TABLET ORAL at 17:45

## 2020-01-28 RX ADMIN — ATORVASTATIN CALCIUM 80 MG: 40 TABLET, FILM COATED ORAL at 17:46

## 2020-01-28 RX ADMIN — LISINOPRIL 10 MG: 10 TABLET ORAL at 17:46

## 2020-01-28 RX ADMIN — LAMOTRIGINE 250 MG: 100 TABLET ORAL at 22:15

## 2020-01-28 RX ADMIN — LACOSAMIDE 200 MG: 100 TABLET, FILM COATED ORAL at 17:46

## 2020-01-28 RX ADMIN — DULOXETINE HYDROCHLORIDE 30 MG: 30 CAPSULE, DELAYED RELEASE ORAL at 17:47

## 2020-01-28 RX ADMIN — TAMSULOSIN HYDROCHLORIDE 0.4 MG: 0.4 CAPSULE ORAL at 17:45

## 2020-01-28 RX ADMIN — ASPIRIN AND DIPYRIDAMOLE 1 CAPSULE: 25; 200 CAPSULE, EXTENDED RELEASE ORAL at 17:46

## 2020-01-28 RX ADMIN — METOPROLOL SUCCINATE 100 MG: 100 TABLET, EXTENDED RELEASE ORAL at 17:45

## 2020-01-28 ASSESSMENT — PAIN SCALES - PAIN ASSESSMENT IN ADVANCED DEMENTIA (PAINAD)
NEGVOCALIZATION: 0
BODYLANGUAGE: 0
BREATHING: 0
BREATHING: 0
CONSOLABILITY: 0
CONSOLABILITY: 0
TOTALSCORE: 0
BREATHING: 0
TOTALSCORE: 0
NEGVOCALIZATION: 0
CONSOLABILITY: 0
FACIALEXPRESSION: 0
FACIALEXPRESSION: 0
TOTALSCORE: 0
BODYLANGUAGE: 0
BODYLANGUAGE: 0
NEGVOCALIZATION: 0
FACIALEXPRESSION: 0

## 2020-01-28 ASSESSMENT — PAIN SCALES - GENERAL: PAINLEVEL_OUTOF10: 0

## 2020-01-28 NOTE — H&P
History and Physical      Name:  Junior Aguilar /Age/Sex: 1955  (60 y.o. male)   MRN & CSN:  [id number] & [id number] Admission Date/Time: 2020  8:17 AM   Location:  [address] [place] [address]- PCP: Jeff Ty MD       Hospital Day: 1    Assessment and Plan:   Junior Aguilar is a 60 y.o.  male  who presents with AMS    1) Probable Toxic Encephalopathy  -Will rule out other metabolic/organic causes  -Review of patient medication list showed multiple antipsychotics and AED's which I believe is contributing to his weakness  -I Spoke with his Neurologist Dr Narda Maldonado who confirmed patient is supposed to be on Vimpat and Lamictal.  -CT Brain: Non acute, showed cerebellar atrophy  -UDS positive for phencyclidine (might be cross reaction)  -Will check MRI brain, check phenytoin level  -Will hold every other sedating meds including antipsychotics  -Neurology consulted     2) DM Type 2  -Will control with insulin    3) Seizure disorder  -Continue with Vimpat and Lamictal    4) Dementia  -Hold Aricept since this was recently started after which patient has become weak    5) Essential HTN  -Uncontrolled  -Resumed meds    6) HLD  -Resume statin    7) Hx of MRDD  -Lives in a group home and has a care taker  Diet No diet orders on file   DVT Prophylaxis [] Lovenox, []  Heparin, [] SCDs, [] Ambulation   GI Prophylaxis [] PPI,  [] H2 Blocker,  [] Carafate,  [] Diet/Tube Feeds   Code Status Prior   Disposition Group home   MDM      History of Present Illness:     Chief Complaint: <principal problem not specified>  Junior Aguilar is a 60 y.o.  male  who presents with AMS. History was obtained from patient's care giver. Patient has been progressively been getting week over the last couple weeks. Couple of days ago, he was noted to be babbling and was very incoherent. Reportedly had an episode of seizure yesterday that lasted for about 1 min. Since then, he has gotten even weaker and does not engage in any conversation.  No prior hx Environmental allergies     Essential hypertension     H/O Doppler ultrasound 07/11/2016    carotid - normal    IBS (irritable bowel syndrome)     Mental disability     Recurrent falls     S/P PTCA (percutaneous transluminal coronary angioplasty) 06/16/2016    Cx OM stent    Seizure disorder (HCC)     Urge incontinence of urine     Wrist joint pain     chronic left     PSHX:  has a past surgical history that includes joint replacement; Percutaneous Transluminal Coronary Angio (06/16/2016); Cardiac surgery; and orif femur decompression (06/2007). Allergies: No Known Allergies    FAM HX: family history includes Cancer in his mother and sister; No Known Problems in his father.   Soc HX:   Social History     Socioeconomic History    Marital status: Single     Spouse name: None    Number of children: None    Years of education: None    Highest education level: None   Occupational History    None   Social Needs    Financial resource strain: None    Food insecurity:     Worry: None     Inability: None    Transportation needs:     Medical: None     Non-medical: None   Tobacco Use    Smoking status: Never Smoker    Smokeless tobacco: Never Used   Substance and Sexual Activity    Alcohol use: No    Drug use: No    Sexual activity: Not Currently     Partners: Female   Lifestyle    Physical activity:     Days per week: None     Minutes per session: None    Stress: None   Relationships    Social connections:     Talks on phone: None     Gets together: None     Attends Oriental orthodox service: None     Active member of club or organization: None     Attends meetings of clubs or organizations: None     Relationship status: None    Intimate partner violence:     Fear of current or ex partner: None     Emotionally abused: None     Physically abused: None     Forced sexual activity: None   Other Topics Concern    None   Social History Narrative    None       Medications:   Medications:    labetalol  10 mg Intravenous Once    aspirin-dipyridamole  1 capsule Oral Daily    atorvastatin  80 mg Oral Daily    DULoxetine  30 mg Oral Daily    levothyroxine  75 mcg Oral Daily    lisinopril  10 mg Oral Daily    melatonin  3 mg Oral Nightly    metoprolol succinate  100 mg Oral Daily    tamsulosin  0.4 mg Oral Daily    lacosamide  200 mg Oral BID    lamoTRIgine  250 mg Oral BID    insulin lispro  0-12 Units Subcutaneous TID WC    insulin lispro  0-6 Units Subcutaneous Nightly      Infusions:    dextrose       PRN Meds: hydrALAZINE, 10 mg, Q6H PRN  glucose, 15 g, PRN  dextrose, 12.5 g, PRN  glucagon (rDNA), 1 mg, PRN  dextrose, 100 mL/hr, PRN          Electronically signed by Dany Post MD on 1/28/2020 at 2:40 PM

## 2020-01-28 NOTE — ED TRIAGE NOTES
Patient to ED with EMS for altered mental status. Patient id from a group home, poor historian, no family or caregiver available for full history or [place].

## 2020-01-28 NOTE — PROGRESS NOTES
Medication History  [facility]    Patient Name: Ramirez Cervantes 1955     Medication history has been completed by: Emerita Lovett CPhT    Source(s) of information: insurance claims and retail pharmacy     Primary Care Physician: Bart Garsia MD     Pharmacy: [address] [place] [address] as of 01/28/2020    (No Known Allergies)        Prior to Admission medications    Medication Sig Start Date End Date Taking?  Authorizing Provider   famotidine (PEPCID) 20 MG tablet Take 20 mg by mouth nightly   Yes Historical Provider, MD   VIMPAT 200 MG tablet TAKE ONE (1) TABLET BY MOUTH TWO TIMES DAILY 1/9/20 2/8/20 Yes Bart Garsia MD   DULoxetine (CYMBALTA) 30 MG extended release capsule Take 1 capsule by mouth daily 12/21/19  Yes Allyson Painter DO   donepezil (ARICEPT) 10 MG tablet Take 1 tablet by mouth nightly 12/21/19  Yes Allyson Painter DO   aspirin-dipyridamole (AGGRENOX)  MG per extended release capsule Take 1 capsule by mouth nightly    Yes Historical Provider, MD   midodrine (PROAMATINE) 5 MG tablet Take 5 mg by mouth 2 times daily   Yes Historical Provider, MD   lamoTRIgine (LAMICTAL) 100 MG tablet Take 250 mg by mouth 2 times daily   Yes Historical Provider, MD   metFORMIN (GLUCOPHAGE) 500 MG tablet Take 2 tablets by mouth 2 times daily (with meals) 12/13/19  Yes Bart Garsia MD   aspirin 81 MG tablet Take 81 mg by mouth daily   Yes Historical Provider, MD   loperamide (IMODIUM) 2 MG capsule Take 2 mg by mouth every 6 hours as needed for Diarrhea    Yes Historical Provider, MD   docusate sodium (COLACE) 100 MG capsule Take 200 mg by mouth daily    Yes Historical Provider, MD   melatonin 3 MG TABS tablet Take 3 mg by mouth daily    Historical Provider, MD   naproxen (NAPROSYN) 500 MG tablet Take 500 mg by mouth daily    Historical Provider, MD   lisinopril (PRINIVIL;ZESTRIL) 10 MG tablet Take 1 tablet by mouth daily 12/21/19   Roosevelt Arriaga DO

## 2020-01-28 NOTE — ED PROVIDER NOTES
mouth daily      phenytoin (DILANTIN) 100 MG ER capsule Take 100 mg by mouth 2 times daily      potassium chloride (KLOR-CON) 20 MEQ packet Take 20 mEq by mouth 2 times daily      sertraline (ZOLOFT) 50 MG tablet Take 50 mg by mouth 2 times daily      tamsulosin (FLOMAX) 0.4 MG capsule Take 0.4 mg by mouth daily      pravastatin (PRAVACHOL) 10 MG tablet Take 10 mg by mouth daily      VIMPAT 200 MG tablet TAKE ONE (1) TABLET BY MOUTH TWO TIMES DAILY 60 tablet 2    DULoxetine (CYMBALTA) 30 MG extended release capsule Take 1 capsule by mouth daily 30 capsule 0    lisinopril (PRINIVIL;ZESTRIL) 10 MG tablet Take 1 tablet by mouth daily 30 tablet 0    donepezil (ARICEPT) 10 MG tablet Take 1 tablet by mouth nightly 30 tablet 0    aspirin-dipyridamole (AGGRENOX)  MG per extended release capsule Take 1 capsule by mouth daily      midodrine (PROAMATINE) 5 MG tablet Take 5 mg by mouth 2 times daily      acetaminophen (TYLENOL) 500 MG tablet Take 1,000 mg by mouth every 4 hours as needed for Pain or Fever      lamoTRIgine (LAMICTAL) 100 MG tablet Take 250 mg by mouth 2 times daily      metFORMIN (GLUCOPHAGE) 500 MG tablet Take 2 tablets by mouth 2 times daily (with meals) 120 tablet 3    ibuprofen (ADVIL;MOTRIN) 200 MG tablet Take 200 mg by mouth every 6 hours as needed for Pain      aspirin 81 MG tablet Take 81 mg by mouth daily      loperamide (IMODIUM) 2 MG capsule Take 2 mg by mouth every 6 hours as needed for Diarrhea       docusate sodium (COLACE) 100 MG capsule Take 200 mg by mouth daily       atorvastatin (LIPITOR) 80 MG tablet Take 1 tablet by mouth daily 90 tablet 3    OLANZapine (ZYPREXA) 7.5 MG tablet Take 7.5 mg by mouth every morning         No Known Allergies  Current Facility-Administered Medications   Medication Dose Route Frequency Provider Last Rate Last Dose    labetalol (NORMODYNE;TRANDATE) injection syringe 10 mg  10 mg Intravenous Once Katarina Funes DO   Stopped at 01/28/20 sounds: Normal heart sounds. No murmur. No friction rub. No gallop. Pulmonary:      Effort: Pulmonary effort is normal. No respiratory distress. Breath sounds: Normal breath sounds. No stridor. No wheezing, rhonchi or rales. Abdominal:      General: Bowel sounds are normal. There is no distension. Palpations: Abdomen is soft. There is no mass. Tenderness: There is no abdominal tenderness. There is no guarding or rebound. Negative signs include Garcia's sign, Rovsing's sign and McBurney's sign. Hernia: No hernia is present. Musculoskeletal: Normal range of motion. General: No swelling, tenderness, deformity or signs of injury. Right lower leg: No edema. Left lower leg: No edema. Skin:     General: Skin is warm. Coloration: Skin is not jaundiced or pale. Findings: No bruising, erythema, lesion or rash. Neurological:      Mental Status: He is alert. He is disoriented and confused. GCS: GCS eye subscore is 4. GCS verbal subscore is 4. GCS motor subscore is 6. Cranial Nerves: Cranial nerves are intact. No cranial nerve deficit, dysarthria or facial asymmetry. Sensory: Sensation is intact. No sensory deficit. Motor: Motor function is intact. No weakness, tremor, atrophy, abnormal muscle tone or seizure activity. Coordination: Coordination is intact. Coordination normal. Finger-Nose-Finger Test normal.   Psychiatric:         Attention and Perception: Attention normal.         Mood and Affect: Affect is flat. Speech: Speech is delayed. Behavior: Behavior is agitated, slowed and withdrawn. Behavior is cooperative. Cognition and Memory: Cognition is impaired. Memory is impaired. Judgment: Judgment is impulsive.            I have reviewed andinterpreted all of the currently available lab results from this visit (if applicable):    Results for orders placed or performed during the hospital encounter of 01/28/20 NEGATIVE NEGATIVE    Barbiturate Screen, Ur NEGATIVE NEGATIVE    Opiates, Urine NEGATIVE NEGATIVE    Phencyclidine, Urine UNCONFIRMED POSITIVE (A) NEGATIVE    Oxycodone  NEGATIVE     NEGATIVE          THRESHOLD CONCENTRATIONS (mg/dL)  AMPHT               1000  JUMA,OPIA             300  BZO,BAR              200  PCP                   25  THC                   50  OXY                  100          IF POSITIVE, SPECIMEN WILL BE  DISCARDED AFTER 6 MONTHS. CALL LAB IF CONFIRMATION NEEDED. ALL NEGATIVE SPECIMENS WILL BE  DISCARDED AFTER ONE WEEK. * UNCONFIRMED POSITIVES MAY  NOT MEET FORENSIC REQUIREMENTS. Blood Gas, Venous   Result Value Ref Range    pH, Familia 7.35 7.32 - 7.42    pCO2, Familia 56 (H) 38 - 52 mmHG    pO2, Familia 33 28 - 48 mmHG    Base Exc, Mixed 3.8  PLUS   (H) 0 - 1.2    HCO3, Venous 30.9 (H) 19 - 25 MMOL/L    O2 Sat, Familia 65.0 50 - 70 %    Comment VBG    SPECIMEN REJECTION   Result Value Ref Range    Rejected Test       PTT2,PT,ACTMN,ALCOM,CMPR,CPK,LIPA,MG,PBNP,SALIC,TSHS    Reason for Rejection UNABLE TO PERFORM TESTING:     Reason for Rejection SPECIMEN HEMOLYZED    SPECIMEN REJECTION   Result Value Ref Range    Rejected Test TROT     Reason for Rejection UNABLE TO PERFORM TESTING:     Reason for Rejection SPECIMEN HEMOLYZED    Acetaminophen Level   Result Value Ref Range    Acetaminophen Level <5.0 (L) 15 - 30 ug/ml    DOSE AMOUNT DOSE AMT. GIVEN - UNKNOWN     DOSE TIME DOSE TIME GIVEN - UNKNOWN    Ethanol   Result Value Ref Range    Alcohol Scrn <0.01  THE VALUE IS BELOW OUR DETECTION LIMIT.    <0.01 %WT/VOL   Comprehensive Metabolic Panel   Result Value Ref Range    Sodium 135 135 - 145 MMOL/L    Potassium 4.5 3.5 - 5.1 MMOL/L    Chloride 96 (L) 99 - 110 mMol/L    CO2 24 21 - 32 MMOL/L    BUN 7 6 - 23 MG/DL    CREATININE 0.8 (L) 0.9 - 1.3 MG/DL    Glucose 141 (H) 70 - 99 MG/DL    Calcium 9.7 8.3 - 10.6 MG/DL    Alb 4.6 3.4 - 5.0 GM/DL    Total Protein 7.2 6.4 - 8.2 GM/DL    Total Bilirubin 0.5 0.0 - 1.0 MG/DL    ALT 62 (H) 10 - 40 U/L    AST 35 15 - 37 IU/L    Alkaline Phosphatase 45 40 - 129 IU/L    GFR Non-African American >60 >60 mL/min/1.73m2    GFR African American >60 >60 mL/min/1.73m2    Anion Gap 15 4 - 16   CK   Result Value Ref Range    Total  38 - 174 IU/L   Lipase   Result Value Ref Range    Lipase 92 (H) 13 - 60 IU/L   Magnesium   Result Value Ref Range    Magnesium 2.0 1.8 - 2.4 mg/dl   Brain Natriuretic Peptide   Result Value Ref Range    Pro-.0 <300 PG/ML   Protime-INR   Result Value Ref Range    Protime 12.4 11.7 - 14.5 SECONDS    INR 1.02 INDEX   APTT   Result Value Ref Range    aPTT 24.2 (L) 25.1 - 57.0 SECONDS   Salicylate   Result Value Ref Range    Salicylate Lvl <4.9 (L) 15 - 30 MG/DL    DOSE AMOUNT DOSE AMT. GIVEN - UNKNOWN     DOSE TIME DOSE TIME GIVEN - UNKNOWN    TSH without Reflex   Result Value Ref Range    TSH, High Sensitivity 1.500 0.270 - 4.20 uIu/ml   Troponin   Result Value Ref Range    Troponin T <0.010 <0.01 NG/ML   EKG 12 Lead   Result Value Ref Range    Ventricular Rate 88 BPM    Atrial Rate 88 BPM    P-R Interval 176 ms    QRS Duration 78 ms    Q-T Interval 344 ms    QTc Calculation (Bazett) 416 ms    P Axis 50 degrees    R Axis 3 degrees    T Axis 48 degrees    Diagnosis       Normal sinus rhythm  Anterior infarct , age undetermined  Abnormal ECG  When compared with ECG of 17-DEC-2019 10:32,  Anterior infarct is now present  Borderline criteria for Inferior infarct are no longer present          Radiographs (if obtained):  [] The following radiograph was interpreted by myself in the absence of a radiologist:  [x] Radiologist's Report Reviewed:    CXR, CT Brain, CT abd/Pelv    Xr Pelvis (1-2 Views)    Result Date: 1/7/2020  EXAMINATION: ONE XRAY VIEW OF THE PELVIS 1/7/2020 11:21 am COMPARISON: None.  HISTORY: ORDERING SYSTEM PROVIDED HISTORY: fall TECHNOLOGIST PROVIDED HISTORY: Reason for exam:->fall Reason for Exam: fall Acuity: Acute Type of Exam: HISTORY: ORDERING SYSTEM PROVIDED HISTORY: fall TECHNOLOGIST PROVIDED HISTORY: Reason for exam:->fall Reason for Exam: fall Acuity: Acute Type of Exam: Initial FINDINGS: The heart is normal in size. No pleural effusion or pneumothorax is seen. No focal lung consolidation is identified. Old left lateral 8th and 9th rib fractures. No acute cardiopulmonary abnormality identified. Old left lateral 8th and 9th rib fractures. EKG (if obtained): (All EKG's are interpreted by myself in the absence of a cardiologist)    12 lead EKG per my interpretation:  Normal Sinus Rhythm 88  Axis is   Normal  QTc is  416  There is no specific T wave changes appreciated. There is no specific ST wave changes appreciated. Prior EKG to compare with was not available     Total critical care time today provided was at least 20 minutes. This excludes seperately billable procedure. Critical care time provided for reviewing labs, images consulting medicine that required close evaluation and/or intervention with concern for patient decompensation. MDM:    Patient here with altered mental status from group home unknown last known well home health nurse noted today that patient is confused not at baseline I have no family or staff present he has no complaints he keeps repeating nurse I see no signs of trauma he is moving all extremities normal finger-to-nose bilaterally no cranial nerve deficit otherwise he is slow to respond but awake and moving he is confused I will get labs imaging will perform altered mental status work-up he is having elevated blood pressure I will scan him then address his blood pressure. Will need admitted. Patient recheck doing well vital signs are stable improving on own I did order labetalol if needed.   Does have elevated lactic acid no definite infection seen at this time Noncon head abdomen pelvis chest x-ray all negative patient will be admitted for altered mental status unclear etiology. CLINICAL IMPRESSION:  Final diagnoses: Altered mental status, unspecified altered mental status type   Elevated blood pressure reading   Pulmonary nodule       (Please note that portions of this note may have been completed with a voice recognition program. Efforts were made to edit the dictations but occasionally words aremis-transcribed.)    DISPOSITION REFERRAL (if applicable):  No follow-up provider specified.     DISPOSITION MEDICATIONS (if applicable):  New Prescriptions    No medications on file          Tracey Fairbanks, [facility],   01/28/20 [id number]

## 2020-01-28 NOTE — PROGRESS NOTES
constipation; Seizure disorder ([facility] [address] Acute metabolic encephalopathy; Peripheral polyneuropathy; Hypoglycemia associated with diabetes ([address] Dysmetria; Coronary atherosclerosis; Urge incontinence of urine; Dementia ([facility] [address] Dizziness; Recurrent falls; Wrist joint pain; Avascular necrosis of bone ([address] and AMS (altered mental status) on their problem list.  ONSET DATE: 1/28/2020     Recent Chest Xray/CT of Chest: negative    Date of Eval: 1/28/2020  Evaluating Therapist: Alla Aleman    Current Diet level:  Current Diet : (no diet ordered)  Current Liquid Diet : (no diet ordered)    Primary Complaint  Patient Complaint: pt is currently non-verbal    Pain:  Does not express pain, appears comfortable     Reason for Referral  Ramirez Cervantes was referred for a bedside swallow evaluation to assess the efficiency of his swallow function, identify signs and symptoms of aspiration and make recommendations regarding safe dietary consistencies, effective compensatory strategies, and safe eating environment. Treatment Plan  Requires SLP Intervention: Yes  Duration/Frequency of Treatment: monitor x 1-2  D/C Recommendations: To be determined     Recommended Diet and Intervention  Diet Solids Recommendation: Dysphagia Soft and Bite-Sized (Dysphagia III)  Liquid Consistency Recommendation:  Thin  Recommended Form of Meds: PO  Recommendations: Dysphagia treatment(to monitor safe intake due to caregiver report of choking)  Therapeutic Interventions: Diet tolerance monitoring;Patient/Family education    Compensatory Swallowing Strategies  Compensatory Swallowing Strategies: Upright as possible for all oral intake    Treatment/Goals  Short-term Goals  Timeframe for Short-term Goals: 1 week  Goal 1: Pt will tolerate Dysphagia 3/Thins with no clinical evidence of aspiration 100%  Goal 2: Caregivers will demonstrate understanding of recs for aspiration precautions and supervision 100%   Long-term Goals  Timeframe for Long-term Goals: N/a    General  Chart Reviewed: Yes  Behavior/Cognition: Alert; Cooperative  Respiratory Status: Room air  O2 Device: None (Room air)  Communication Observation: Non-verbal  Follows Directions: None  Dentition: Dentures top;Dentures bottom  Patient Positioning: Upright in bed  Baseline Vocal Quality: Normal  Volitional Cough: (LIVE)  Volitional Swallow: (LIVE)  Prior Dysphagia History: caregiver present reports pt \"chokes\" at least once/day  Consistencies Administered: Reg solid; Dysphagia Pureed (Dysphagia I); Thin - straw           Vision/Hearing  Vision  Vision: Impaired  Vision Exceptions: Wears glasses at all times  Hearing  Hearing: Within functional limits    Oral Motor Deficits  Oral/Motor  Oral Motor: Exceptions to [facility]  Labial ROM: Reduced right  Labial Symmetry: Abnormal symmetry right  Labial Strength: Reduced    Oral Phase Dysfunction  Oral Phase  Oral Phase: WFL     Indicators of Pharyngeal Phase Dysfunction   Pharyngeal Phase  Pharyngeal Phase: WFL    Prognosis  Prognosis  Prognosis for safe diet advancement: good  Barriers to reach goals: cognitive deficits  Barriers/Prognosis Comment: dementia  Individuals consulted  Consulted and agree with results and recommendations: Other (add comment)  Family member consulted: caregiver    Education  Patient Education: results and recommendations  Patient Education Response: No evidence of learning(completed with caregiver who demonstrates understanding)  Safety Devices in place: Yes  Type of devices: All fall risk precautions in place; Left in bed       Therapy Time  SLP Individual Minutes  Time In: 1430  Time Out: 1500  Minutes: Michelle Palacios, [place]  1/28/2020 2:58 PM

## 2020-01-29 LAB
GLUCOSE BLD-MCNC: 127 MG/DL (ref 70–99)
GLUCOSE BLD-MCNC: 151 MG/DL (ref 70–99)
GLUCOSE BLD-MCNC: 158 MG/DL (ref 70–99)
GLUCOSE BLD-MCNC: 180 MG/DL (ref 70–99)
HCT VFR BLD CALC: 49.1 % (ref 42–52)
HEMOGLOBIN: 16.4 GM/DL (ref 13.5–18)

## 2020-01-29 PROCEDURE — 1200000000 HC SEMI PRIVATE

## 2020-01-29 PROCEDURE — 85014 HEMATOCRIT: CPT

## 2020-01-29 PROCEDURE — 82962 GLUCOSE BLOOD TEST: CPT

## 2020-01-29 PROCEDURE — 94761 N-INVAS EAR/PLS OXIMETRY MLT: CPT

## 2020-01-29 PROCEDURE — 2580000003 HC RX 258: Performed by: NURSE PRACTITIONER

## 2020-01-29 PROCEDURE — 36415 COLL VENOUS BLD VENIPUNCTURE: CPT

## 2020-01-29 PROCEDURE — 6370000000 HC RX 637 (ALT 250 FOR IP): Performed by: PSYCHIATRY & NEUROLOGY

## 2020-01-29 PROCEDURE — 85018 HEMOGLOBIN: CPT

## 2020-01-29 PROCEDURE — 92526 ORAL FUNCTION THERAPY: CPT | Performed by: SPEECH-LANGUAGE PATHOLOGIST

## 2020-01-29 PROCEDURE — 6370000000 HC RX 637 (ALT 250 FOR IP): Performed by: INTERNAL MEDICINE

## 2020-01-29 RX ORDER — AMLODIPINE BESYLATE 10 MG/1
10 TABLET ORAL DAILY
Status: DISCONTINUED | OUTPATIENT
Start: 2020-01-29 | End: 2020-01-31 | Stop reason: HOSPADM

## 2020-01-29 RX ORDER — ZIPRASIDONE HYDROCHLORIDE 20 MG/1
20 CAPSULE ORAL 2 TIMES DAILY WITH MEALS
Status: DISCONTINUED | OUTPATIENT
Start: 2020-01-29 | End: 2020-01-30

## 2020-01-29 RX ORDER — 0.9 % SODIUM CHLORIDE 0.9 %
1000 INTRAVENOUS SOLUTION INTRAVENOUS ONCE
Status: COMPLETED | OUTPATIENT
Start: 2020-01-30 | End: 2020-01-30

## 2020-01-29 RX ADMIN — LAMOTRIGINE 250 MG: 100 TABLET ORAL at 08:31

## 2020-01-29 RX ADMIN — DULOXETINE HYDROCHLORIDE 30 MG: 30 CAPSULE, DELAYED RELEASE ORAL at 08:32

## 2020-01-29 RX ADMIN — SODIUM CHLORIDE 1000 ML: 9 INJECTION, SOLUTION INTRAVENOUS at 23:57

## 2020-01-29 RX ADMIN — LEVOTHYROXINE SODIUM 75 MCG: 0.07 TABLET ORAL at 06:05

## 2020-01-29 RX ADMIN — INSULIN LISPRO 2 UNITS: 100 INJECTION, SOLUTION INTRAVENOUS; SUBCUTANEOUS at 17:18

## 2020-01-29 RX ADMIN — LACOSAMIDE 200 MG: 100 TABLET, FILM COATED ORAL at 08:32

## 2020-01-29 RX ADMIN — MELATONIN 3 MG: at 22:21

## 2020-01-29 RX ADMIN — AMLODIPINE BESYLATE 10 MG: 10 TABLET ORAL at 13:32

## 2020-01-29 RX ADMIN — ZIPRASIDONE HYDROCHLORIDE 20 MG: 20 CAPSULE ORAL at 17:18

## 2020-01-29 RX ADMIN — ATORVASTATIN CALCIUM 80 MG: 40 TABLET, FILM COATED ORAL at 08:32

## 2020-01-29 RX ADMIN — LAMOTRIGINE 250 MG: 100 TABLET ORAL at 22:21

## 2020-01-29 RX ADMIN — LACOSAMIDE 200 MG: 100 TABLET, FILM COATED ORAL at 22:21

## 2020-01-29 RX ADMIN — LISINOPRIL 10 MG: 10 TABLET ORAL at 08:32

## 2020-01-29 RX ADMIN — TAMSULOSIN HYDROCHLORIDE 0.4 MG: 0.4 CAPSULE ORAL at 08:32

## 2020-01-29 RX ADMIN — ASPIRIN AND DIPYRIDAMOLE 1 CAPSULE: 25; 200 CAPSULE, EXTENDED RELEASE ORAL at 08:32

## 2020-01-29 RX ADMIN — INSULIN LISPRO 2 UNITS: 100 INJECTION, SOLUTION INTRAVENOUS; SUBCUTANEOUS at 13:31

## 2020-01-29 RX ADMIN — METOPROLOL SUCCINATE 100 MG: 100 TABLET, EXTENDED RELEASE ORAL at 08:32

## 2020-01-29 RX ADMIN — INSULIN LISPRO 2 UNITS: 100 INJECTION, SOLUTION INTRAVENOUS; SUBCUTANEOUS at 08:33

## 2020-01-29 ASSESSMENT — PAIN SCALES - PAIN ASSESSMENT IN ADVANCED DEMENTIA (PAINAD)
BREATHING: 0
TOTALSCORE: 0
NEGVOCALIZATION: 0
BREATHING: 0
TOTALSCORE: 0
TOTALSCORE: 0
FACIALEXPRESSION: 0
TOTALSCORE: 0
FACIALEXPRESSION: 0
BREATHING: 0
NEGVOCALIZATION: 0
NEGVOCALIZATION: 0
FACIALEXPRESSION: 0
FACIALEXPRESSION: 0
TOTALSCORE: 0
NEGVOCALIZATION: 0
BODYLANGUAGE: 0
CONSOLABILITY: 0
NEGVOCALIZATION: 0
BODYLANGUAGE: 0
CONSOLABILITY: 0
BREATHING: 0
CONSOLABILITY: 0
TOTALSCORE: 0
BREATHING: 0
BODYLANGUAGE: 0
FACIALEXPRESSION: 0
CONSOLABILITY: 0
FACIALEXPRESSION: 0
BREATHING: 0
BREATHING: 0
CONSOLABILITY: 0
BODYLANGUAGE: 0
NEGVOCALIZATION: 0
BREATHING: 0
FACIALEXPRESSION: 0
FACIALEXPRESSION: 0
TOTALSCORE: 0
TOTALSCORE: 0
CONSOLABILITY: 0
FACIALEXPRESSION: 0
FACIALEXPRESSION: 0
CONSOLABILITY: 0
BREATHING: 0
CONSOLABILITY: 0
BODYLANGUAGE: 0
CONSOLABILITY: 0
BODYLANGUAGE: 0
BODYLANGUAGE: 0
CONSOLABILITY: 0
FACIALEXPRESSION: 0
BODYLANGUAGE: 0
CONSOLABILITY: 0
FACIALEXPRESSION: 0
TOTALSCORE: 0
BREATHING: 0
BREATHING: 0
CONSOLABILITY: 0
BODYLANGUAGE: 0
TOTALSCORE: 0
BREATHING: 0
BREATHING: 0
BODYLANGUAGE: 0
BODYLANGUAGE: 0
TOTALSCORE: 0
NEGVOCALIZATION: 0
BREATHING: 0
TOTALSCORE: 0
CONSOLABILITY: 0
BODYLANGUAGE: 0
TOTALSCORE: 0
TOTALSCORE: 0
NEGVOCALIZATION: 0
BODYLANGUAGE: 0
NEGVOCALIZATION: 0
BODYLANGUAGE: 0
NEGVOCALIZATION: 0
TOTALSCORE: 0
BREATHING: 0
BODYLANGUAGE: 0
NEGVOCALIZATION: 0
CONSOLABILITY: 0
NEGVOCALIZATION: 0
NEGVOCALIZATION: 0
CONSOLABILITY: 0
FACIALEXPRESSION: 0

## 2020-01-29 ASSESSMENT — PAIN SCALES - GENERAL: PAINLEVEL_OUTOF10: 0

## 2020-01-29 NOTE — PROGRESS NOTES
PRN  glucagon (rDNA), 1 mg, PRN  dextrose, 100 mL/hr, PRN          Electronically signed by Buddy Blanco MD on 1/29/2020 at 12:42 PM

## 2020-01-29 NOTE — CONSULTS
Chart reviewed and patient examined. Consult dictated. H/O MRDD, seizures , & Schizophrenia. Increased confusion ? Cause.   I think we should start geodon, 20 mg bid      Electronically signed by Kuldip Ramirez MD on 1/29/20 at 2:34 PM

## 2020-01-29 NOTE — PLAN OF CARE
Problem: Health Behavior:  Goal: Ability to manage health-related needs will improve  Description  Ability to manage health-related needs will improve  1/29/2020 0949 by Tavon Mendes RN  Outcome: Ongoing     Problem: Physical Regulation:  Goal: Ability to maintain a stable neurologic state will improve  Description  Ability to maintain a stable neurologic state will improve  1/29/2020 0949 by Tavon Mendes RN  Outcome: Ongoing     Problem: Safety:  Goal: Ability to remain free from injury will improve  Description  Ability to remain free from injury will improve  1/29/2020 0949 by Tavon Mendes RN  Outcome: Ongoing     Problem: Self-Concept:  Goal: Level of anxiety will decrease  Description  Level of anxiety will decrease  1/29/2020 0949 by Tavon Mendes RN  Outcome: Ongoing     Problem: Self-Concept:  Goal: Ability to verbalize feelings about condition will improve  Description  Ability to verbalize feelings about condition will improve  1/29/2020 0949 by Tavon Mendes RN  Outcome: Ongoing     Problem: SAFETY  Goal: Free from accidental physical injury  1/29/2020 0949 by Tavon Mendes RN  Outcome: Ongoing     Problem: DAILY CARE  Goal: Daily care needs are met  1/29/2020 0949 by Tavon Mendes RN  Outcome: Ongoing     Problem: KNOWLEDGE DEFICIT  Goal: Patient/S.O. demonstrates understanding of disease process, treatment plan, medications, and discharge instructions.   1/29/2020 0949 by Tavon Mendes RN  Outcome: Ongoing     Problem: DISCHARGE BARRIERS  Goal: Patient's continuum of care needs are met  1/29/2020 0949 by Tavon Mendes RN  Outcome: Ongoing     Problem: Tissue Perfusion - Cardiopulmonary, Altered:  Goal: Hemodynamic stability will improve  Description  Hemodynamic stability will improve  1/29/2020 0949 by Tavon Mendes RN  Outcome: Ongoing     Problem: Falls - Risk of:  Goal: Will remain free from falls  Description  Will remain free from falls  Outcome: Ongoing     Problem: Falls - Risk of:  Goal: Absence of physical injury  Description  Absence of physical injury  Outcome: Ongoing

## 2020-01-30 LAB
GLUCOSE BLD-MCNC: 121 MG/DL (ref 70–99)
GLUCOSE BLD-MCNC: 129 MG/DL (ref 70–99)
GLUCOSE BLD-MCNC: 183 MG/DL (ref 70–99)
GLUCOSE BLD-MCNC: 192 MG/DL (ref 70–99)
HCT VFR BLD CALC: 48.1 % (ref 42–52)
HEMOGLOBIN: 16.1 GM/DL (ref 13.5–18)

## 2020-01-30 PROCEDURE — 85018 HEMOGLOBIN: CPT

## 2020-01-30 PROCEDURE — 97163 PT EVAL HIGH COMPLEX 45 MIN: CPT

## 2020-01-30 PROCEDURE — 97530 THERAPEUTIC ACTIVITIES: CPT

## 2020-01-30 PROCEDURE — 97167 OT EVAL HIGH COMPLEX 60 MIN: CPT

## 2020-01-30 PROCEDURE — 6370000000 HC RX 637 (ALT 250 FOR IP): Performed by: INTERNAL MEDICINE

## 2020-01-30 PROCEDURE — 94761 N-INVAS EAR/PLS OXIMETRY MLT: CPT

## 2020-01-30 PROCEDURE — 1200000000 HC SEMI PRIVATE

## 2020-01-30 PROCEDURE — 99221 1ST HOSP IP/OBS SF/LOW 40: CPT | Performed by: NURSE PRACTITIONER

## 2020-01-30 PROCEDURE — 97116 GAIT TRAINING THERAPY: CPT

## 2020-01-30 PROCEDURE — 82962 GLUCOSE BLOOD TEST: CPT

## 2020-01-30 PROCEDURE — 85014 HEMATOCRIT: CPT

## 2020-01-30 PROCEDURE — 97112 NEUROMUSCULAR REEDUCATION: CPT

## 2020-01-30 PROCEDURE — 6370000000 HC RX 637 (ALT 250 FOR IP): Performed by: PSYCHIATRY & NEUROLOGY

## 2020-01-30 RX ORDER — ZIPRASIDONE HYDROCHLORIDE 20 MG/1
40 CAPSULE ORAL 2 TIMES DAILY WITH MEALS
Status: DISCONTINUED | OUTPATIENT
Start: 2020-01-30 | End: 2020-01-31

## 2020-01-30 RX ADMIN — AMLODIPINE BESYLATE 10 MG: 10 TABLET ORAL at 08:30

## 2020-01-30 RX ADMIN — METOPROLOL SUCCINATE 100 MG: 100 TABLET, EXTENDED RELEASE ORAL at 08:29

## 2020-01-30 RX ADMIN — LACOSAMIDE 200 MG: 100 TABLET, FILM COATED ORAL at 21:45

## 2020-01-30 RX ADMIN — ATORVASTATIN CALCIUM 80 MG: 40 TABLET, FILM COATED ORAL at 08:30

## 2020-01-30 RX ADMIN — INSULIN LISPRO 2 UNITS: 100 INJECTION, SOLUTION INTRAVENOUS; SUBCUTANEOUS at 12:07

## 2020-01-30 RX ADMIN — LAMOTRIGINE 250 MG: 100 TABLET ORAL at 08:29

## 2020-01-30 RX ADMIN — INSULIN LISPRO 2 UNITS: 100 INJECTION, SOLUTION INTRAVENOUS; SUBCUTANEOUS at 16:51

## 2020-01-30 RX ADMIN — LEVOTHYROXINE SODIUM 75 MCG: 0.07 TABLET ORAL at 08:30

## 2020-01-30 RX ADMIN — ZIPRASIDONE HYDROCHLORIDE 40 MG: 20 CAPSULE ORAL at 16:50

## 2020-01-30 RX ADMIN — ZIPRASIDONE HYDROCHLORIDE 20 MG: 20 CAPSULE ORAL at 08:29

## 2020-01-30 RX ADMIN — LAMOTRIGINE 250 MG: 100 TABLET ORAL at 21:45

## 2020-01-30 RX ADMIN — DULOXETINE HYDROCHLORIDE 30 MG: 30 CAPSULE, DELAYED RELEASE ORAL at 08:30

## 2020-01-30 RX ADMIN — MELATONIN 3 MG: at 21:46

## 2020-01-30 RX ADMIN — LACOSAMIDE 200 MG: 100 TABLET, FILM COATED ORAL at 08:30

## 2020-01-30 RX ADMIN — LISINOPRIL 10 MG: 10 TABLET ORAL at 08:30

## 2020-01-30 RX ADMIN — TAMSULOSIN HYDROCHLORIDE 0.4 MG: 0.4 CAPSULE ORAL at 08:30

## 2020-01-30 ASSESSMENT — PAIN SCALES - PAIN ASSESSMENT IN ADVANCED DEMENTIA (PAINAD)
BREATHING: 0
TOTALSCORE: 0
TOTALSCORE: 0
BODYLANGUAGE: 0
FACIALEXPRESSION: 0
BREATHING: 0
TOTALSCORE: 0
BODYLANGUAGE: 0
TOTALSCORE: 0
NEGVOCALIZATION: 0
TOTALSCORE: 0
NEGVOCALIZATION: 0
BREATHING: 0
BODYLANGUAGE: 0
NEGVOCALIZATION: 0
BREATHING: 0
FACIALEXPRESSION: 0
NEGVOCALIZATION: 0
BREATHING: 0
CONSOLABILITY: 0
BREATHING: 0
NEGVOCALIZATION: 0
CONSOLABILITY: 0
FACIALEXPRESSION: 0
BREATHING: 0
FACIALEXPRESSION: 0
FACIALEXPRESSION: 0
BODYLANGUAGE: 0
TOTALSCORE: 0
NEGVOCALIZATION: 0
NEGVOCALIZATION: 0
TOTALSCORE: 0
TOTALSCORE: 0
BODYLANGUAGE: 0
FACIALEXPRESSION: 0
NEGVOCALIZATION: 0
NEGVOCALIZATION: 0
FACIALEXPRESSION: 0
BODYLANGUAGE: 0
BREATHING: 0
CONSOLABILITY: 0
NEGVOCALIZATION: 0
CONSOLABILITY: 0
TOTALSCORE: 0
BODYLANGUAGE: 0
FACIALEXPRESSION: 0
BREATHING: 0
TOTALSCORE: 0
FACIALEXPRESSION: 0
CONSOLABILITY: 0
CONSOLABILITY: 0
TOTALSCORE: 0
CONSOLABILITY: 0
BODYLANGUAGE: 0
BREATHING: 0
TOTALSCORE: 0
BREATHING: 0
NEGVOCALIZATION: 0
CONSOLABILITY: 0
NEGVOCALIZATION: 0
CONSOLABILITY: 0
CONSOLABILITY: 0
TOTALSCORE: 0
BODYLANGUAGE: 0
BODYLANGUAGE: 0
BREATHING: 0
CONSOLABILITY: 0
TOTALSCORE: 0
NEGVOCALIZATION: 0
TOTALSCORE: 0
BODYLANGUAGE: 0
FACIALEXPRESSION: 0
CONSOLABILITY: 0
CONSOLABILITY: 0
FACIALEXPRESSION: 0
BODYLANGUAGE: 0
BREATHING: 0
CONSOLABILITY: 0
BODYLANGUAGE: 0
FACIALEXPRESSION: 0
NEGVOCALIZATION: 0
CONSOLABILITY: 0
BREATHING: 0
NEGVOCALIZATION: 0
CONSOLABILITY: 0
FACIALEXPRESSION: 0
BODYLANGUAGE: 0
FACIALEXPRESSION: 0
FACIALEXPRESSION: 0
NEGVOCALIZATION: 0
FACIALEXPRESSION: 0
TOTALSCORE: 0

## 2020-01-30 ASSESSMENT — PAIN SCALES - GENERAL
PAINLEVEL_OUTOF10: 0
PAINLEVEL_OUTOF10: 0

## 2020-01-30 NOTE — PROGRESS NOTES
symmetric in size. There is no renal or ureteral calculi. No hydronephrosis. GI/Bowel: Stomach, small bowel, and colon are nondilated. Normal appendix. Pelvis: Evaluation of the pelvis is degraded by streak artifact from bilateral hip hardware. Urinary bladder is decompressed around a Mayne catheter. Pelvic organs are normal.  No free fluid in the pelvis. Peritoneum/Retroperitoneum: Aortoiliac atherosclerotic calcification. Abdominal aorta is normal in caliber. No free fluid in the abdomen. Bones/Soft Tissues: Multiple remote healed anterolateral left rib fractures. Subacute healing 11th and 12th posterior left rib fractures. Status post bilateral total hip arthroplasty. No acute inflammatory process in the abdomen or pelvis. 1.5 cm noncalcified nodule in the right lower lobe. Noncontrast CT chest is recommended for further evaluation. Xr Pelvis (1-2 Views)    Result Date: 1/7/2020  EXAMINATION: ONE XRAY VIEW OF THE PELVIS 1/7/2020 11:21 am COMPARISON: None. HISTORY: ORDERING SYSTEM PROVIDED HISTORY: fall TECHNOLOGIST PROVIDED HISTORY: Reason for exam:->fall Reason for Exam: fall Acuity: Acute Type of Exam: Initial FINDINGS: Single frontal view of the pelvis. Postsurgical changes of bilateral hip arthroplasties. The femoral components are incompletely visualized. No acute displaced fracture. Normal alignment of the bilateral sacroiliac joints and symphysis pubis. Mild degenerative osseous changes in the bilateral sacroiliac joints and symphysis pubis. Incompletely characterized lower lumbar spondylosis. No aggressive skeletal lesion. No acute osseous abnormality in the pelvis. Postsurgical changes of bilateral hip arthroplasties. Mild degenerative osseous changes in the pelvis.      Ct Head Wo Contrast    Result Date: 1/28/2020  EXAMINATION: CT OF THE HEAD WITHOUT CONTRAST  1/28/2020 9:03 am TECHNIQUE: CT of the head was performed without the administration of intravenous contrast. Dose identified. No evidence of traumatic malalignment. DEGENERATIVE CHANGES: Multilevel endplate and facet degenerative changes with moderate to severe disc height loss at C4-C5 and C6-C7. Lucency within the left C4 lateral mass likely represents an intraosseous cyst secondary to degenerative change. SOFT TISSUES: There is no prevertebral soft tissue swelling. No acute cervical spine fracture identified. Xr Chest Portable    Result Date: 1/28/2020  EXAMINATION: ONE XRAY VIEW OF THE CHEST 1/28/2020 8:29 am COMPARISON: 01/07/2020 HISTORY: ORDERING SYSTEM PROVIDED HISTORY: ams TECHNOLOGIST PROVIDED HISTORY: Reason for exam:->ams Reason for Exam: ams Acuity: Unknown Type of Exam: Unknown Relevant Medical/Surgical History: cad    dementia FINDINGS: Hypoinflated lungs. Coronary artery stent. Heart size and mediastinal contours are within normal limits. The pulmonary vascularity is normal.  No focal airspace consolidation. No significant pleural effusion or pneumothorax. Hypoinflated lungs. Otherwise, no acute cardiopulmonary abnormality. Xr Chest 1 Vw    Result Date: 1/7/2020  EXAMINATION: ONE XRAY VIEW OF THE CHEST 1/7/2020 11:22 am COMPARISON: 12/17/2019 HISTORY: ORDERING SYSTEM PROVIDED HISTORY: fall TECHNOLOGIST PROVIDED HISTORY: Reason for exam:->fall Reason for Exam: fall Acuity: Acute Type of Exam: Initial FINDINGS: The heart is normal in size. No pleural effusion or pneumothorax is seen. No focal lung consolidation is identified. Old left lateral 8th and 9th rib fractures. No acute cardiopulmonary abnormality identified. Old left lateral 8th and 9th rib fractures.      Mri Brain Wo Contrast    Result Date: 1/28/2020  EXAMINATION: MRI OF THE BRAIN WITHOUT CONTRAST  1/28/2020 4:08 pm TECHNIQUE: Multiplanar multisequence MRI of the brain was performed without the administration of intravenous contrast. COMPARISON: 12/22/2019 HISTORY: ORDERING SYSTEM PROVIDED HISTORY: AMS TECHNOLOGIST PROVIDED HISTORY: Reason for exam:->AMS Reason for Exam: AMS, possible fall FINDINGS: INTRACRANIAL STRUCTURES/VENTRICLES: There is no acute infarct or mass. There is stable cerebellar predominant parenchymal volume loss. No focal parenchymal signal abnormality is seen. No mass effect or midline shift. No evidence of an acute intracranial hemorrhage. The ventricles and sulci are normal in size and configuration. The sellar/suprasellar regions appear unremarkable. The normal signal voids within the major intracranial vessels appear maintained. ORBITS: The visualized portion of the orbits demonstrate no acute abnormality. SINUSES: The visualized paranasal sinuses and mastoid air cells are well aerated. BONES/SOFT TISSUES: The bone marrow signal intensity appears normal. The soft tissues demonstrate no acute abnormality. 1. No acute intracranial abnormality. 2. Stable cerebellar atrophy.        Assessment:     Patient Active Problem List   Diagnosis    Obsessive-compulsive disorder    IBS (irritable bowel syndrome)    Mental disability    Uncontrolled type 2 diabetes mellitus with diabetic polyneuropathy, without long-term current use of insulin ([address]    Coronary artery disease involving native coronary artery of native heart with unstable angina pectoris ([address]    Essential hypertension    Dyslipidemia    Ataxia    Late effects of CVA (cerebrovascular accident)    Chronic idiopathic constipation    Seizure disorder ([address]    Acute metabolic encephalopathy    Peripheral polyneuropathy    Hypoglycemia associated with diabetes ([address]    Dysmetria    Coronary atherosclerosis    Urge incontinence of urine    Dementia (HCC)    Dizziness    Recurrent falls    Wrist joint pain    Avascular necrosis of bone ([address]    AMS (altered mental status)       Plan:   1. continue present treatment     Electronically signed by Janet Tse MD on 1/30/2020 at 5:14 PM

## 2020-01-30 NOTE — CONSULTS
[address], [address] [place] [address]                                  CONSULTATION    PATIENT NAME: Diomedes Elizabeth                        :        1955  MED REC NO:   [id number]                          ROOM:       1557  ACCOUNT NO:   [de-identified]                           ADMIT DATE: 2020  PROVIDER:     Aubrey Leyva MD    CONSULT DATE:  2020    REFERRING PROVIDER:  Dr. Pj Marte. HISTORY OF PRESENT ILLNESS:  The patient is a 73-year-old man who is  known to have mild-to-moderate MRDD and has underlying schizophrenia. He has history of seizure disorder for many years. His seizures have  been occurring at variable intervals, although in the past they have  been reasonably well controlled. Occasionally, he gets an episode of  seizures, cause of the recurrence is not usually known. He was found to be confused and disoriented at his place and his staff  felt that something was not right. He was not his usual self. Nobody  had witnessed any seizure like activity, but he was very groggy,  confused and not responding as well; thus, he was brought to the  hospital and admitted. Since admission, he has not any seizure  activity. He has been waking up some, but does not respond very well. He usually avoids talking to people. He is not running any fever. He has difficulty with balance and  walking. There is no lateralized weakness of any side of the body. He is known to have marked cognition impairment and also is known to  have coronary artery disease. He has underlying hypertension and  diabetes. He has coronary artery disease and has had angioplasty. PHYSICAL EXAMINATION:  GENERAL:  Reveals him to be a somewhat lethargic individual, who is very  reluctant to talk. He does not like to be touched. NEUROLOGIC:  He seems to be moving his arms and legs about equally.    Muscle stretch reflexes are sluggish;

## 2020-01-30 NOTE — PROGRESS NOTES
toilet, chair in prep for increased functional independence. Pt will perform UB ADLs with CGA to increase functional independence. Pt will perform LB ADLs with CGA to increase functional independence. Pt will perform all aspects of toileting with CGA to increase functional independence. Pt will participate in therex/therax c emphasis on strength, activity tolerance,  safety, SEDA tasks. Plan:  Plan  Times per week: 1x      Recommendation for activity with nursing staff:  pivot only  up to chair for meals    Treatment today:    Neuro-Muscular re-education:  Cues were given for position, posture, kinesthetic sense, safety, recruitment, and rationale. Cues were verbal and/or tactile. Therapeutic Activity Training:   Therapeutic activity training was instructed today. Cues were given for safety, sequence, UE/LE placement, visual cues, and balance. Activities performed today included bed mobility training, sup-sit, sit-stand, SPT. Education: Role of OT, OT POC, d/c needs, home safety    Safety: Left in chair with all needs in reach. chair alarm applied. Gait belt used for transfer and mobility. Time in:  1530  Time out:  1605  Timed treatment minutes:  23  Total treatment time:  35    Electronically signed by:    [address]ole[address] Antoine, OTR/L, [place]   [id number]   8:10 AM, 1/31/2020

## 2020-01-30 NOTE — PROGRESS NOTES
Hospitalist Progress Note      Name:  Kari Silverio /Age/Sex: 1955  (60 y.o. male)   MRN & CSN:  [id number] & [id number] Admission Date/Time: 2020  8:17 AM   Location:  [address] [place] [address]- PCP: Krish Carter MD         Hospital Day: 3    Assessment and Plan:   Hubert Pride a 60 y. o.  male  who presents with AMS     1) Probable Toxic Encephalopathy  -Metabolic/organic causes ruled out  -Review of patient medication list showed multiple antipsychotics and AED's which I believe contributed to his weakness  -CT Brain and MRI Brain: Both non acute, showed stable cerebellar atrophy  -UDS positive for phencyclidine (might be cross reaction)  -Labs reviewed: No significant abnormality; no obvious signs of infection  -Discontinue every other sedating meds including multiple antipsychotics  -Neurology on board; appreciate recommendation     2) DM Type 2  -Will control with insulin     3) Seizure disorder  -Continue with Vimpat and Lamictal     4) Dementia  -Hold Aricept since this was recently started after which patient has become weak     5) Essential HTN  -Uncontrolled  -Resumed meds; added Amlodipine     6) HLD  -Resume statin     7) Hx of MRDD  -Lives in a group home and has a care taker    8) Schizophrenia  -On PO Geodon; will continue and titrate as tolerated  -Psych consult to adjust meds       Diet DIET DYSPHAGIA SOFT AND BITE-SIZED;   DVT Prophylaxis [] Lovenox, []  Heparin, [] SCDs, [] Ambulation   GI Prophylaxis [] PPI,  [] H2 Blocker,  [] Carafate,  [] Diet/Tube Feeds   Code Status Prior   Disposition [facility]      History of Present Illness:     Patient was seen and examined  More awake today and more talkative as well  Denied any pain currently    Ten point ROS reviewed negative, unless as noted above    Objective:        Intake/Output Summary (Last 24 hours) at 2020 1520  Last data filed at 2020 1803  Gross per 24 hour   Intake 880 ml   Output --   Net 880 ml      Vitals:   Vitals: 01/30/20 1415   BP: 105/60   Pulse: 74   Resp: 16   Temp: 95.6 °F (35.3 °C)   SpO2:      Physical Exam:   GEN Awake male, laying in bed in no apparent distress. Appears given age. EYES Pupils are equally round. No scleral erythema, discharge, or conjunctivitis. HENT Mucous membranes are moist. Oral pharynx without exudates, no evidence of thrush. NECK Supple, no apparent thyromegaly or masses. RESP Clear to auscultation, no wheezes, rales or rhonchi. Symmetric chest movement while on room air. CARDIO/VASC S1/S2 auscultated. Regular rate without appreciable murmurs, rubs, or gallops. No JVD or carotid bruits. Peripheral pulses equal bilaterally and palpable. No peripheral edema. GI Abdomen is soft without significant tenderness, masses, or guarding. Bowel sounds are normoactive. Rectal exam deferred.  No costovertebral angle tenderness. Normal appearing external genitalia. Mayen catheter is not present. HEME/LYMPH No palpable cervical lymphadenopathy and no hepatosplenomegaly. No petechiae or ecchymoses. MSK No gross joint deformities. SKIN Normal coloration, warm, dry. NEURO Couldn't assess. PSYCH Awake, alert. Affect is flat.     Medications:   Medications:    ziprasidone  40 mg Oral BID     amLODIPine  10 mg Oral Daily    labetalol  10 mg Intravenous Once    [Held by provider] aspirin-dipyridamole  1 capsule Oral Daily    atorvastatin  80 mg Oral Daily    DULoxetine  30 mg Oral Daily    levothyroxine  75 mcg Oral Daily    lisinopril  10 mg Oral Daily    melatonin  3 mg Oral Nightly    metoprolol succinate  100 mg Oral Daily    tamsulosin  0.4 mg Oral Daily    lacosamide  200 mg Oral BID    lamoTRIgine  250 mg Oral BID    insulin lispro  0-12 Units Subcutaneous TID WC    insulin lispro  0-6 Units Subcutaneous Nightly      Infusions:    dextrose       PRN Meds: hydrALAZINE, 10 mg, Q6H PRN  glucose, 15 g, PRN  dextrose, 12.5 g, PRN  glucagon (rDNA), 1 mg, PRN  dextrose, 100

## 2020-01-30 NOTE — CONSULTS
Initial Psychiatric History and Physical    Junior Aguilar  [id number]  1/28/2020 01/30/20    ID: Patient is a 60 yrs y.o. male    CC: None stated    Psychiatry consulted for acute exacerbation of schizophrenia    HPI: Junior Aguilar is a 60 y.o. male  who presented with AMS on 1/28/2020. History was obtained from patient's care giver. Patient has been progressively been getting weak over the last couple weeks. Couple of days ago, he was noted to be babbling and was very incoherent. Reportedly had an episode of seizure yesterday that lasted for about 1 min. Since then, he has gotten even weaker and does not engage in any conversation. No prior hx of fever, chills, cough, chest pain or abdominal pain. No neck stiffness or headaches. No alcohol or recreational drug use. No associated focal weakness or deviation of the mouth. Patient followed every command but did not speak. Current medical problems include: probable toxic encephalopathy thought to be secondary to multiple antipsychotic and AEDs, DM2, seizure disorder (on Vimpat and Lamictal per Dr. Narda Maldonado Neurologist) demential, HTN, HLD, history of MRDD, underlying schizophrenia. During today's interview patient was alert and oriented to self and city. He was responding to internal stimuli and making nonsensical statements such as \"eat food, turn, turn it. \" Unable to assess SI/HI, AVH, depression or anxiety.     Past Psychiatric History: schizophrenia    Family Psychiatric History:   Family History   Problem Relation Age of Onset    Cancer Mother     No Known Problems Father         strange to father    Cancer Sister         Allergies:  No Known Allergies     OBJECTIVE  Vital Signs:  Vitals:    01/30/20 1415   BP: 105/60   Pulse: 74   Resp: 16   Temp: 95.6 °F (35.3 °C)   SpO2:        Labs:  Recent Results (from the past 48 hour(s))   POCT Glucose    Collection Time: 01/28/20 10:04 PM   Result Value Ref Range    POC Glucose 140 (H) 70 - 99 MG/DL   POCT Glucose appear consistent with age and condition     [] atrophy      [] abnormal movements  PSYCHIATRIC:    Relatedness:  [] cooperative, [] guarded, [x] indifferent, [] hostile,      [] sedated  Speech:  [x] normal prosody, [] pressured, [] decreased volume,    [] increased volume [] slurred [] slowed, [] delayed     [] echolalia, [] incoherent, [] stuttering   Eye contact:  [] direct, [] fleeting , [] intense [x]  none  Kinetics:  [] normal, [x] increased, [] decreased  Mood:   [] stable, [] depressed, [x] anxious, [] irritable,     [] labile  [] euphoric   Affect:   [] normal range, [] constricted, [] depressed, [x] anxious,  [] angry, [x]  blunted     [] mood incongruent, [] blunted  [] restricted   Hallucinations:  [] denies, [] auditory,  [] visual,  [] olfactory, [] tactile  Appears to be responding to internal stimuli however does not respond to questions about AVH    Delusions:  [] none, [] grandiose,  [] paranoid,  [] persecutory,  [] somatic,  Unable to assess [] bizarre  [] Gnosticist/spiritual      Preoccupations:   [] none, [] violence, [] obsessions, [] other  Unable to assess       Suicidal ideation  [] denies, [] endorses  Unable to assess    Homicidal ideation [] denies, [] endorses  Unable to assess    Thought process: [] logical , [] circumstantial, [] tangential, [] ALISTAIR,     [] simplistic, [] disorganized  [] FOI  [] concrete  [x] nonsensical    Thought Content: [] future oriented [] goal directed  [] self-harm, [] guilt,     [] hopelessness  [] obsessive  [] superficial  [] preoccupation      Insight:   [] adequate , [] limited, [x] impaired    Judgment:  [] adequate , [] limited  [x] impaired  Associations:              []  Logical and coherent, [] loosening, [x] disorganized   Attention and concentration:     [] intact [] limited [] impaired , [x] grossly impaired  Orientation:  [x] person, place     [x] disoriented to: time, situation   Memory:             [] superficially intact, [] impaired  Unable to assess         Impression:   Acute exacerbation of schizophrenia vs. Metabolic encephalopathy    Problem List:   AMS (altered mental status)    Plan:  1. Case discussed with Dr. Gary León  2. Increase Geodon to 60 mg BID  3. Patient is a candidate for [facility] once medically cleared. Please have  co-ordinate through [facility] Candelario Driver if Hospitalist desires this course of action  4. Will follow loosely    Thank you very much for the interesting consult.     Electronically signed by JOSE Alberto CNP on 1/30/2020 at 5:40 PM

## 2020-01-31 ENCOUNTER — HOSPITAL ENCOUNTER (INPATIENT)
Age: 65
LOS: 25 days | Discharge: NURSING, REHAB OR LONG-TERM CARE | DRG: 885 | End: 2020-02-25
Attending: PSYCHIATRY & NEUROLOGY | Admitting: PSYCHIATRY & NEUROLOGY
Payer: MEDICARE

## 2020-01-31 VITALS
WEIGHT: 194.6 LBS | BODY MASS INDEX: 24.97 KG/M2 | SYSTOLIC BLOOD PRESSURE: 130 MMHG | DIASTOLIC BLOOD PRESSURE: 84 MMHG | OXYGEN SATURATION: 95 % | HEART RATE: 84 BPM | HEIGHT: 74 IN | TEMPERATURE: 98.7 F | RESPIRATION RATE: 16 BRPM

## 2020-01-31 PROBLEM — F20.9 ACUTE EXACERBATION OF CHRONIC SCHIZOPHRENIA (HCC): Chronic | Status: ACTIVE | Noted: 2020-01-31

## 2020-01-31 PROBLEM — F20.9 SCHIZOPHRENIA (HCC): Status: ACTIVE | Noted: 2020-01-31

## 2020-01-31 LAB
GLUCOSE BLD-MCNC: 108 MG/DL (ref 70–99)
GLUCOSE BLD-MCNC: 132 MG/DL (ref 70–99)
GLUCOSE BLD-MCNC: 136 MG/DL (ref 70–99)
GLUCOSE BLD-MCNC: 139 MG/DL (ref 70–99)
GLUCOSE BLD-MCNC: 142 MG/DL (ref 70–99)

## 2020-01-31 PROCEDURE — 6370000000 HC RX 637 (ALT 250 FOR IP): Performed by: NURSE PRACTITIONER

## 2020-01-31 PROCEDURE — 82962 GLUCOSE BLOOD TEST: CPT

## 2020-01-31 PROCEDURE — 94761 N-INVAS EAR/PLS OXIMETRY MLT: CPT

## 2020-01-31 PROCEDURE — 6370000000 HC RX 637 (ALT 250 FOR IP): Performed by: INTERNAL MEDICINE

## 2020-01-31 PROCEDURE — 1240000000 HC EMOTIONAL WELLNESS R&B

## 2020-01-31 RX ORDER — ACETAMINOPHEN 325 MG/1
325 TABLET ORAL EVERY 4 HOURS PRN
Status: DISCONTINUED | OUTPATIENT
Start: 2020-01-31 | End: 2020-02-25 | Stop reason: HOSPADM

## 2020-01-31 RX ORDER — ACETAMINOPHEN 325 MG/1
650 TABLET ORAL EVERY 4 HOURS PRN
Status: DISCONTINUED | OUTPATIENT
Start: 2020-01-31 | End: 2020-02-25 | Stop reason: HOSPADM

## 2020-01-31 RX ORDER — HALOPERIDOL 5 MG/ML
5 INJECTION INTRAMUSCULAR EVERY 4 HOURS PRN
Status: DISCONTINUED | OUTPATIENT
Start: 2020-01-31 | End: 2020-02-25 | Stop reason: HOSPADM

## 2020-01-31 RX ORDER — METOPROLOL SUCCINATE 50 MG/1
100 TABLET, EXTENDED RELEASE ORAL DAILY
Status: DISCONTINUED | OUTPATIENT
Start: 2020-02-01 | End: 2020-02-08

## 2020-01-31 RX ORDER — TAMSULOSIN HYDROCHLORIDE 0.4 MG/1
0.4 CAPSULE ORAL DAILY
Status: DISCONTINUED | OUTPATIENT
Start: 2020-02-01 | End: 2020-02-25 | Stop reason: HOSPADM

## 2020-01-31 RX ORDER — AMLODIPINE BESYLATE 5 MG/1
10 TABLET ORAL DAILY
Status: DISCONTINUED | OUTPATIENT
Start: 2020-02-01 | End: 2020-02-08

## 2020-01-31 RX ORDER — LANOLIN ALCOHOL/MO/W.PET/CERES
3 CREAM (GRAM) TOPICAL NIGHTLY
Status: DISCONTINUED | OUTPATIENT
Start: 2020-02-01 | End: 2020-02-25 | Stop reason: HOSPADM

## 2020-01-31 RX ORDER — ZIPRASIDONE HYDROCHLORIDE 20 MG/1
60 CAPSULE ORAL 2 TIMES DAILY WITH MEALS
Status: DISCONTINUED | OUTPATIENT
Start: 2020-01-31 | End: 2020-01-31 | Stop reason: HOSPADM

## 2020-01-31 RX ORDER — LACOSAMIDE 50 MG/1
200 TABLET ORAL 2 TIMES DAILY
Status: DISCONTINUED | OUTPATIENT
Start: 2020-02-01 | End: 2020-02-04 | Stop reason: ALTCHOICE

## 2020-01-31 RX ORDER — LEVOTHYROXINE SODIUM 0.07 MG/1
75 TABLET ORAL DAILY
Status: DISCONTINUED | OUTPATIENT
Start: 2020-02-01 | End: 2020-02-25 | Stop reason: HOSPADM

## 2020-01-31 RX ORDER — LORAZEPAM 2 MG/ML
1 INJECTION INTRAMUSCULAR EVERY 4 HOURS PRN
Status: DISCONTINUED | OUTPATIENT
Start: 2020-01-31 | End: 2020-02-25 | Stop reason: HOSPADM

## 2020-01-31 RX ORDER — LORAZEPAM 1 MG/1
1 TABLET ORAL EVERY 4 HOURS PRN
Status: DISCONTINUED | OUTPATIENT
Start: 2020-01-31 | End: 2020-02-25 | Stop reason: HOSPADM

## 2020-01-31 RX ORDER — NICOTINE POLACRILEX 4 MG
15 LOZENGE BUCCAL PRN
Status: DISCONTINUED | OUTPATIENT
Start: 2020-01-31 | End: 2020-02-03 | Stop reason: SDUPTHER

## 2020-01-31 RX ORDER — ACETAMINOPHEN 500 MG
500 TABLET ORAL EVERY 4 HOURS PRN
Status: DISCONTINUED | OUTPATIENT
Start: 2020-01-31 | End: 2020-02-25 | Stop reason: HOSPADM

## 2020-01-31 RX ORDER — ATORVASTATIN CALCIUM 40 MG/1
80 TABLET, FILM COATED ORAL DAILY
Status: DISCONTINUED | OUTPATIENT
Start: 2020-02-01 | End: 2020-02-25 | Stop reason: HOSPADM

## 2020-01-31 RX ORDER — ZIPRASIDONE HYDROCHLORIDE 60 MG/1
60 CAPSULE ORAL 2 TIMES DAILY WITH MEALS
Qty: 60 CAPSULE | Refills: 3 | Status: ON HOLD | OUTPATIENT
Start: 2020-01-31 | End: 2020-02-25 | Stop reason: HOSPADM

## 2020-01-31 RX ORDER — LISINOPRIL 10 MG/1
10 TABLET ORAL DAILY
Status: DISCONTINUED | OUTPATIENT
Start: 2020-02-01 | End: 2020-02-25 | Stop reason: HOSPADM

## 2020-01-31 RX ORDER — METOPROLOL SUCCINATE 100 MG/1
100 TABLET, EXTENDED RELEASE ORAL DAILY
Qty: 30 TABLET | Refills: 3 | Status: ON HOLD | OUTPATIENT
Start: 2020-02-01 | End: 2020-02-25 | Stop reason: HOSPADM

## 2020-01-31 RX ORDER — LEVOTHYROXINE SODIUM 0.07 MG/1
75 TABLET ORAL DAILY
Qty: 30 TABLET | Refills: 3 | Status: SHIPPED | OUTPATIENT
Start: 2020-02-01

## 2020-01-31 RX ORDER — AMLODIPINE BESYLATE 10 MG/1
5 TABLET ORAL DAILY
Qty: 30 TABLET | Refills: 3 | Status: ON HOLD | OUTPATIENT
Start: 2020-02-01 | End: 2020-02-25 | Stop reason: HOSPADM

## 2020-01-31 RX ORDER — DULOXETIN HYDROCHLORIDE 30 MG/1
30 CAPSULE, DELAYED RELEASE ORAL DAILY
Status: DISCONTINUED | OUTPATIENT
Start: 2020-02-01 | End: 2020-02-13

## 2020-01-31 RX ORDER — TRAZODONE HYDROCHLORIDE 50 MG/1
50 TABLET ORAL NIGHTLY PRN
Status: DISCONTINUED | OUTPATIENT
Start: 2020-02-01 | End: 2020-02-25 | Stop reason: HOSPADM

## 2020-01-31 RX ORDER — HALOPERIDOL 5 MG
5 TABLET ORAL EVERY 4 HOURS PRN
Status: DISCONTINUED | OUTPATIENT
Start: 2020-01-31 | End: 2020-02-25 | Stop reason: HOSPADM

## 2020-01-31 RX ADMIN — LEVOTHYROXINE SODIUM 75 MCG: 0.07 TABLET ORAL at 06:00

## 2020-01-31 RX ADMIN — AMLODIPINE BESYLATE 10 MG: 10 TABLET ORAL at 09:47

## 2020-01-31 RX ADMIN — LACOSAMIDE 200 MG: 100 TABLET, FILM COATED ORAL at 20:17

## 2020-01-31 RX ADMIN — DULOXETINE HYDROCHLORIDE 30 MG: 30 CAPSULE, DELAYED RELEASE ORAL at 09:47

## 2020-01-31 RX ADMIN — LAMOTRIGINE 250 MG: 100 TABLET ORAL at 09:46

## 2020-01-31 RX ADMIN — LISINOPRIL 10 MG: 10 TABLET ORAL at 09:47

## 2020-01-31 RX ADMIN — METOPROLOL SUCCINATE 100 MG: 100 TABLET, EXTENDED RELEASE ORAL at 09:47

## 2020-01-31 RX ADMIN — LACOSAMIDE 200 MG: 100 TABLET, FILM COATED ORAL at 09:48

## 2020-01-31 RX ADMIN — MELATONIN 3 MG: at 20:17

## 2020-01-31 RX ADMIN — LAMOTRIGINE 250 MG: 100 TABLET ORAL at 20:16

## 2020-01-31 RX ADMIN — ZIPRASIDONE HYDROCHLORIDE 60 MG: 20 CAPSULE ORAL at 20:20

## 2020-01-31 RX ADMIN — ATORVASTATIN CALCIUM 80 MG: 40 TABLET, FILM COATED ORAL at 09:47

## 2020-01-31 RX ADMIN — ZIPRASIDONE HYDROCHLORIDE 40 MG: 20 CAPSULE ORAL at 09:47

## 2020-01-31 RX ADMIN — TAMSULOSIN HYDROCHLORIDE 0.4 MG: 0.4 CAPSULE ORAL at 09:47

## 2020-01-31 ASSESSMENT — PAIN SCALES - PAIN ASSESSMENT IN ADVANCED DEMENTIA (PAINAD)
BODYLANGUAGE: 0
NEGVOCALIZATION: 0
NEGVOCALIZATION: 0
TOTALSCORE: 0
CONSOLABILITY: 0
FACIALEXPRESSION: 0
TOTALSCORE: 0
NEGVOCALIZATION: 0
BREATHING: 0
TOTALSCORE: 0
FACIALEXPRESSION: 0
CONSOLABILITY: 0
CONSOLABILITY: 0
BREATHING: 0
CONSOLABILITY: 0
TOTALSCORE: 0
BREATHING: 0
BODYLANGUAGE: 0
TOTALSCORE: 0
TOTALSCORE: 0
BREATHING: 0
BODYLANGUAGE: 0
TOTALSCORE: 0
NEGVOCALIZATION: 0
FACIALEXPRESSION: 0
CONSOLABILITY: 0
BREATHING: 0
CONSOLABILITY: 0
NEGVOCALIZATION: 0
FACIALEXPRESSION: 0
BREATHING: 0
CONSOLABILITY: 0
FACIALEXPRESSION: 0
FACIALEXPRESSION: 0
BODYLANGUAGE: 0
BODYLANGUAGE: 0
BREATHING: 0
FACIALEXPRESSION: 0
FACIALEXPRESSION: 0
NEGVOCALIZATION: 0
BREATHING: 0
TOTALSCORE: 0
NEGVOCALIZATION: 0
BODYLANGUAGE: 0
NEGVOCALIZATION: 0
CONSOLABILITY: 0

## 2020-01-31 ASSESSMENT — PAIN SCALES - GENERAL
PAINLEVEL_OUTOF10: 0

## 2020-01-31 NOTE — DISCHARGE SUMMARY
thrush. NECK  Supple, no apparent thyromegaly or masses. RESP  Clear to auscultation, no wheezes, rales or rhonchi. Symmetric chest movement while on room air. CARDIO/VASC           S1/S2 auscultated. Regular rate without appreciable murmurs, rubs, or gallops. No JVD or carotid bruits. Peripheral pulses equal bilaterally and palpable. No peripheral edema. GI        Abdomen is soft without significant tenderness, masses, or guarding. Bowel sounds are normoactive. Rectal exam deferred.        No costovertebral angle tenderness. Normal appearing external genitalia. Mayen catheter is not present. HEME/LYMPH            No palpable cervical lymphadenopathy and no hepatosplenomegaly. No petechiae or ecchymoses. MSK    No gross joint deformities. SKIN    Normal coloration, warm, dry.   NEURO Confused but no focal deficit       BMP/CBC  Recent Labs     01/29/20  1942 01/30/20  0600   HCT 49.1 48.1       IMAGING:  As above    Discharge Time of 35 minutes    Electronically signed by Wess Eisenmenger, MD on 1/31/2020 at 4:43 PM

## 2020-01-31 NOTE — PROGRESS NOTES
Progress Note          Subjective:     Patient awake but is in his own world. Responds with v few words, keeps talking to himself. Keeps moving hands & arns in odd fashion. No definite seizures  Appreciates Psych consult. He can be transferred any time. Objective:   VITALS:  BP (!) 96/58 Comment: Encouraged fluid  Pulse 86   Temp 98.7 °F (37.1 °C) (Axillary)   Resp 18   Ht 6' 2\" (1.88 m)   Wt 194 lb 9.6 oz (88.3 kg)   SpO2 96%   BMI 24.99 kg/m²       PHYSICAL EXAM:          DATA:    CBC:  Recent Labs     01/29/20 1942 01/30/20  0600   HGB 16.4 16.1   HCT 49.1 48.1      BMP:  No results for input(s): NA, K, CL, CO2, BUN, CREATININE, CALCIUM, GLUCOSE in the last 72 hours. Radiology  Ct Abdomen Pelvis Wo Contrast Additional Contrast? None    Result Date: 1/31/2020  EXAMINATION: CT OF THE ABDOMEN AND PELVIS WITHOUT CONTRAST 1/28/2020 9:04 am TECHNIQUE: CT of the abdomen and pelvis was performed without the administration of intravenous contrast. Multiplanar reformatted images are provided for review. Dose modulation, iterative reconstruction, and/or weight based adjustment of the mA/kV was utilized to reduce the radiation dose to as low as reasonably achievable. COMPARISON: None. HISTORY: ORDERING SYSTEM PROVIDED HISTORY: [facility] TECHNOLOGIST PROVIDED HISTORY: Reason for exam:->AMS Additional Contrast?->None Reason for Exam: ams  MRDD  /// ABD PAIN Acuity: Acute Type of Exam: Initial FINDINGS: Lower Chest: There is a 1.4 x 1.6 cm noncalcified nodule in the right lower lobe which abuts the pleura. There is no basilar pericardial or pleural effusion. Organs: Lack of intravenous contrast limits evaluation of the solid organs and bowel. Liver is normal in morphology. Normal gallbladder. Spleen, adrenal glands, and pancreas are normal.  Kidneys are symmetric in size. There is no renal or ureteral calculi. No hydronephrosis. GI/Bowel: Stomach, small bowel, and colon are nondilated. Normal appendix. Pelvis: Evaluation of the pelvis is degraded by streak artifact from bilateral hip hardware. Urinary bladder is decompressed around a Mayen catheter. Pelvic organs are normal.  No free fluid in the pelvis. Peritoneum/Retroperitoneum: Aortoiliac atherosclerotic calcification. Abdominal aorta is normal in caliber. No free fluid in the abdomen. Bones/Soft Tissues: Multiple remote healed anterolateral left rib fractures. Subacute healing 11th and 12th posterior left rib fractures. Status post bilateral total hip arthroplasty. No acute inflammatory process in the abdomen or pelvis. 1.5 cm noncalcified nodule in the right lower lobe. Noncontrast CT chest is recommended for further evaluation. Xr Pelvis (1-2 Views)    Result Date: 1/7/2020  EXAMINATION: ONE XRAY VIEW OF THE PELVIS 1/7/2020 11:21 am COMPARISON: None. HISTORY: ORDERING SYSTEM PROVIDED HISTORY: fall TECHNOLOGIST PROVIDED HISTORY: Reason for exam:->fall Reason for Exam: fall Acuity: Acute Type of Exam: Initial FINDINGS: Single frontal view of the pelvis. Postsurgical changes of bilateral hip arthroplasties. The femoral components are incompletely visualized. No acute displaced fracture. Normal alignment of the bilateral sacroiliac joints and symphysis pubis. Mild degenerative osseous changes in the bilateral sacroiliac joints and symphysis pubis. Incompletely characterized lower lumbar spondylosis. No aggressive skeletal lesion. No acute osseous abnormality in the pelvis. Postsurgical changes of bilateral hip arthroplasties. Mild degenerative osseous changes in the pelvis.      Ct Head Wo Contrast    Result Date: 1/28/2020  EXAMINATION: CT OF THE HEAD WITHOUT CONTRAST  1/28/2020 9:03 am TECHNIQUE: CT of the head was performed without the administration of intravenous contrast. Dose modulation, iterative reconstruction, and/or weight based adjustment of the mA/kV was utilized to reduce the radiation dose to as low as reasonably change of the cerebellum with associated prominence of extra-axial spaces within the posterior cranial fossa, unchanged. Minimal low attenuation within periventricular white matter is again identified and may represent changes of minimal ischemic leukoencephalopathy. Faint basal ganglia calcification is again identified. No new abnormal extra-axial fluid collections are identified. There is atherosclerotic calcification of distal internal carotid and vertebral arteries. ORBITS: The visualized portion of the orbits demonstrate no acute abnormality. SINUSES: The visualized paranasal sinuses and mastoid air cells demonstrate no acute abnormality. Left frontal sinus is hypoplastic on a developmental basis. SOFT TISSUES/SKULL:  No acute abnormality of the visualized skull or soft tissues. No evidence of acute intracranial abnormality. Ct Cervical Spine Wo Contrast    Result Date: 1/7/2020  EXAMINATION: CT OF THE CERVICAL SPINE WITHOUT CONTRAST 1/7/2020 11:38 am TECHNIQUE: CT of the cervical spine was performed without the administration of intravenous contrast. Multiplanar reformatted images are provided for review. Dose modulation, iterative reconstruction, and/or weight based adjustment of the mA/kV was utilized to reduce the radiation dose to as low as reasonably achievable. COMPARISON: None HISTORY: ORDERING SYSTEM PROVIDED HISTORY: fall TECHNOLOGIST PROVIDED HISTORY: Reason for exam:->fall Reason for Exam: fall,seizure Acuity: Acute Type of Exam: Initial Mechanism of Injury: had an episode of fall and states that hit the back of his head and had brief about 1 min of seizure per people's information from the work @ [employer] 10 AM this morning FINDINGS: BONES/ALIGNMENT: Slight straightening of the normal cervical lordosis. No acute cervical spine fracture is identified. No evidence of traumatic malalignment.  DEGENERATIVE CHANGES: Multilevel endplate and facet degenerative changes with moderate to severe disc height loss at C4-C5 and C6-C7. Lucency within the left C4 lateral mass likely represents an intraosseous cyst secondary to degenerative change. SOFT TISSUES: There is no prevertebral soft tissue swelling. No acute cervical spine fracture identified. Xr Chest Portable    Result Date: 1/28/2020  EXAMINATION: ONE XRAY VIEW OF THE CHEST 1/28/2020 8:29 am COMPARISON: 01/07/2020 HISTORY: ORDERING SYSTEM PROVIDED HISTORY: ams TECHNOLOGIST PROVIDED HISTORY: Reason for exam:->ams Reason for Exam: ams Acuity: Unknown Type of Exam: Unknown Relevant Medical/Surgical History: cad    dementia FINDINGS: Hypoinflated lungs. Coronary artery stent. Heart size and mediastinal contours are within normal limits. The pulmonary vascularity is normal.  No focal airspace consolidation. No significant pleural effusion or pneumothorax. Hypoinflated lungs. Otherwise, no acute cardiopulmonary abnormality. Xr Chest 1 Vw    Result Date: 1/7/2020  EXAMINATION: ONE XRAY VIEW OF THE CHEST 1/7/2020 11:22 am COMPARISON: 12/17/2019 HISTORY: ORDERING SYSTEM PROVIDED HISTORY: fall TECHNOLOGIST PROVIDED HISTORY: Reason for exam:->fall Reason for Exam: fall Acuity: Acute Type of Exam: Initial FINDINGS: The heart is normal in size. No pleural effusion or pneumothorax is seen. No focal lung consolidation is identified. Old left lateral 8th and 9th rib fractures. No acute cardiopulmonary abnormality identified. Old left lateral 8th and 9th rib fractures. Mri Brain Wo Contrast    Result Date: 1/28/2020  EXAMINATION: MRI OF THE BRAIN WITHOUT CONTRAST  1/28/2020 4:08 pm TECHNIQUE: Multiplanar multisequence MRI of the brain was performed without the administration of intravenous contrast. COMPARISON: 12/22/2019 HISTORY: ORDERING SYSTEM PROVIDED HISTORY: AMS TECHNOLOGIST PROVIDED HISTORY: Reason for exam:->AMS Reason for Exam: AMS, possible fall FINDINGS: INTRACRANIAL STRUCTURES/VENTRICLES: There is no acute infarct or mass. There is stable cerebellar predominant parenchymal volume loss. No focal parenchymal signal abnormality is seen. No mass effect or midline shift. No evidence of an acute intracranial hemorrhage. The ventricles and sulci are normal in size and configuration. The sellar/suprasellar regions appear unremarkable. The normal signal voids within the major intracranial vessels appear maintained. ORBITS: The visualized portion of the orbits demonstrate no acute abnormality. SINUSES: The visualized paranasal sinuses and mastoid air cells are well aerated. BONES/SOFT TISSUES: The bone marrow signal intensity appears normal. The soft tissues demonstrate no acute abnormality. 1. No acute intracranial abnormality. 2. Stable cerebellar atrophy.        Assessment:     Patient Active Problem List   Diagnosis    Obsessive-compulsive disorder    IBS (irritable bowel syndrome)    Mental disability    Uncontrolled type 2 diabetes mellitus with diabetic polyneuropathy, without long-term current use of insulin ([address]    Coronary artery disease involving native coronary artery of native heart with unstable angina pectoris ([address]    Essential hypertension    Dyslipidemia    Ataxia    Late effects of CVA (cerebrovascular accident)    Chronic idiopathic constipation    Seizure disorder ([address]    Acute metabolic encephalopathy    Peripheral polyneuropathy    Hypoglycemia associated with diabetes ([address]    Dysmetria    Coronary atherosclerosis    Urge incontinence of urine    Dementia ([address]    Dizziness    Recurrent falls    Wrist joint pain    Avascular necrosis of bone ([address]    AMS (altered mental status)    Acute exacerbation of chronic schizophrenia ([address]       Plan:   1. continue present treatment     Electronically signed by Kierra Emmanuel MD on 1/31/2020 at 5:20 PM

## 2020-01-31 NOTE — PROGRESS NOTES
about where tray, chair, curtain, etc. is positioned;  patient demonstrates intermittent impulsivity throughout evaluation  Pain Screening  Patient Currently in Pain: Denies  Vital Signs  Patient Currently in Pain: Denies       Orientation  Orientation  Overall Orientation Status: Impaired  Orientation Level: Oriented to person;Disoriented to time;Oriented to place  Social/Functional History  Social/Functional History  Type of Home: Facility([facility])  Home Layout: One level  Home Access: Level entry  ADL Assistance: Independent  Homemaking Assistance: Needs assistance(staff at group [facility])  Ambulation Assistance: Independent  Transfer Assistance: Independent  Cognition   Cognition  Overall Cognitive Status: Exceptions  Following Commands: Inconsistently follows commands  Attention Span: Difficulty attending to directions  Memory: Decreased long term memory;Decreased short term memory  Safety Judgement: Decreased awareness of need for safety;Decreased awareness of need for assistance  Problem Solving: Decreased awareness of errors  Insights: Decreased awareness of deficits  Initiation: Requires cues for some  Sequencing: Requires cues for some    Objective             Strength RLE  Comment: knee extension: at least 3+/5 observed functionally  Strength LLE  Comment: knee extension: at least 3+/5 observed functionally     Sensation  Overall Sensation Status: WNL  Bed mobility  Supine to Sit: Minimal assistance(with verbal and tactile cues for BUE and BLE placement throughout transfer; with HOB elevated; with increased time for task completion)  Transfers  Sit to Stand: Minimal Assistance  Stand to sit: Moderate Assistance(with verbal cues to feel chair against back of legs, reach back, and sit slowly; poor eccentric control)  Ambulation  Ambulation?: Yes  Ambulation 1  Device: No Device  Assistance: Minimal assistance; Moderate assistance  Quality of Gait: decreased gait speed, decreased step length bilaterally, unsteady gait, intermittent retropulsion  Distance: 5 feet  Comments: with verbal and tactile cues to maintain upright posture in order to avoid COM shifting outside of PRISCILLA and in order to avoid retropulsion; with verbal and tactile cues for BLE placement throughout ambulation     Balance  Posture: Poor(forward head, rounded shoulders, increased thoracic kyphosis)  Sitting - Static: Good  Sitting - Dynamic: Fair  Standing - Static: Poor;+  Standing - Dynamic: Poor;+      Gait Training:  Cues were given for safety, sequence, device management, balance, posture, awareness, path. Therapeutic Activity Training:   Therapeutic activity training was instructed today. Cues were given for safety, sequence, UE/LE placement, awareness, and balance. Activities performed today included bed mobility training, sup-sit, sit-stand, SPT. Plan   Plan  Times per week: 3+  Current Treatment Recommendations: Strengthening, ROM, Balance Training, Functional Mobility Training, Transfer Training, Endurance Training, ADL/Self-care Training, Neuromuscular Re-education, Patient/Caregiver Education & Training, Pain Management, Equipment Evaluation, Education, & procurement, Home Exercise Program, IADL Training, Gait Training, Stair training, Safety Education & Training, Positioning, Cognitive/Perceptual Training, Cognitive Reorientation, Wheelchair Mobility Training  Safety Devices  Type of devices: All fall risk precautions in place, Patient at risk for falls, Call light within reach, Left in chair, Chair alarm in place, Gait belt, Nurse notified, Fidelia Smiling in use      AM-PAC Score  AM-PAC Inpatient Mobility Raw Score : 13 (01/30/20 1930)  AM-PAC Inpatient T-Scale Score : 36.74 (01/30/20 1930)  Mobility Inpatient CMS 0-100% Score: 64.91 (01/30/20 1930)  Mobility Inpatient CMS G-Code Modifier : CL (01/30/20 1930)          Goals  Long term goals  Long term goal 1:  In one week, pt will complete all bed mobility with SBAx1  Long term goal 2: In one week, pt will complete sit <> stand transfers with CGAx1   Long term goal 3: In one week, pt will ambulate 40 feet with minAx2 with LRAD  Long term goal 4:  In one week, pt will complete 2 sets of 10 reps of BLE AROM exercises in available and allowed ROM with minAx1       Time In: 1530  Time Out: 1605  Total Treatment Time: 35  Timed Code Treatment Minutes: [facility] BRIAN Peterson, DPT  License #: [id number]

## 2020-01-31 NOTE — CARE COORDINATION
[address] Transitions Interview     2020    Patient: Susana Young Patient : 1955   MRN: [id number]  Reason for Admission: AMS  RARS: Readmission Risk Score: 6         Spoke with:  / Zohra      Readmission Risk  Patient Active Problem List   Diagnosis    Obsessive-compulsive disorder    IBS (irritable bowel syndrome)    Mental disability    Uncontrolled type 2 diabetes mellitus with diabetic polyneuropathy, without long-term current use of insulin ([address]    Coronary artery disease involving native coronary artery of native heart with unstable angina pectoris ([address]    Essential hypertension    Dyslipidemia    Ataxia    Late effects of CVA (cerebrovascular accident)    Chronic idiopathic constipation    Seizure disorder ([address]    Acute metabolic encephalopathy    Peripheral polyneuropathy    Hypoglycemia associated with diabetes ([address]    Dysmetria    Coronary atherosclerosis    Urge incontinence of urine    Dementia ([address]    Dizziness    Recurrent falls    Wrist joint pain    Avascular necrosis of bone ([address]    AMS (altered mental status)       Inpatient Assessment  Care Transitions Summary    Care Transitions Inpatient Review  Medication Review  Are you able to afford your medications?:  Yes  How often do you have difficulty taking your medications?:  I always take them as prescribed. Housing Review  Who do you live with?:  Other Caregiver  Are you an active caregiver in your home?:  No  Social Support  Do you have a [facility]?:  Yes  [facility] Name:  [address] [place] [address] Highway:  [phone]  Durable Medical Equipment  Functional Review  Hearing and Vision  Care Transitions Interventions                                 Follow Up:  Spoke with . Oriented to the role of Care Transition with business card given. Patient caregiver reports that patient came into the facility with AMS.   Prior to admission patient lived at a
LSW read PT/OT notes and it looks like pt will need a short term stay at a SNF for rehab. LSW spoke with pt caregiver Zohra and she stated pt is usually very independent of all his ADLs. Zohra stated pt is usually is own guardian. However pt is very confused at this time. Zohra stated that this LSW should call pt CM with [employer]. Pt CM is Leia Beard. [phone]. LSW called [facility] and left her a message asking for a return call. Received a call from Pt LESLY Macdonald with [facility]. [facility]) and she agrees that it is usually very independent and will need rehab. [facility] has requested [facility], [facility] 2[facility]  And [facility]. LSW called [facility] and  Gave referral to [facility]. She will look over pt info and get back with this LSW.
LSW received a call from [facility] with [facility] and they can take pt today. LSW PS Dr. Keith Hogan and informed him pt can go to [facility] and will need a pink slip. RN to call report to [phone].
Pt lives in a group home with 24/7 care with [facility], care coordinator is JUAN CARLOS [phone] . Rosaura Plunkett Pt is active with [facility]. Pt also goes to Adult  during the day. Plans at this time is for pt to go to home. CM will need inpt HC order at discharge. If pt is discharge after hours please complete the following. [place] [facility] [phone] and inform them of discharge and the need to resume services.   Fax:  HC order, H&P, face sheet and AVS to [phone]
to be used as a guide, and should not alter their currently established discharge plan. Current discharge plan: SNF    Collaboration completed with case management: Yes       Spoke with patient with no family support present. Patient is oriented to person; confused. Informed of CTN plan to contact caregiver to review NH predict. Copy of tool provided to patient. Spoke with patient caregiver; Blake Tripp. Reviewed NH predict tool with recommendation for SNF placement which aligns with current plan. Case Management is assisting with placement as per patient caregiver recommendations:  [facility], [address] [place] [address] and [facility]. Facility has not been confirmed at this time. Informed patient caregiver that she will be notified which facility approves placement. Patient caregiver denies patient needs. Agreeable to continued Care Transition. Confirmed CTN contact information. Encouraged call back if needs arise. Future Appointments   Date Time Provider Courtney Pringle   2/4/2020  9:40 AM Alissa Delgadillo MD [address]   2/13/2020  9:30 AM Stacey Sky [facility]   2/20/2020 10:30 AM Fiona Paredes MD [facility]       Health Maintenance  There are no preventive care reminders to display for this patient.     Jeral Gitelman, RN

## 2020-01-31 NOTE — PROGRESS NOTES
Hospitalist Progress Note      Name:  Suzi Aburto /Age/Sex: 1955  (60 y.o. male)   MRN & CSN:  [id number] & [id number] Admission Date/Time: 2020  8:17 AM   Location:  [address] [place] [address]- PCP: Jose G Barrett MD         Hospital Day: 4    Assessment and Plan:   Suzi Aburto is a 60 y.o.  male  who presents with AMS (altered mental status)     1) Probable Toxic Encephalopathy  -Metabolic/organic causes ruled out  -Review of patient medication list showed multiple antipsychotics and AED's which I believe contributed to his weakness  -CT Brain and MRI Brain: Both non acute, showed stable cerebellar atrophy  -UDS positive for phencyclidine (might be cross reaction)  -Labs reviewed: No significant abnormality; no obvious signs of infection  -Discontinue every other sedating meds including multiple antipsychotics  -Neurology on board; appreciate recommendation     2) DM Type 2  -Will control with insulin     3) Seizure disorder  -Continue with Vimpat and Lamictal     4) Dementia  -Hold Aricept since this was recently started after which patient has become weak     5) Essential HTN  -Uncontrolled  -Resumed meds; added Amlodipine     6) HLD  -Resume statin     7) Hx of MRDD  -Lives in a group home and has a care taker     8) Schizophrenia exacerbation  -On PO Geodon; will continue and titrate as tolerated  -Psych consulted to adjust meds; awaiting recommendation       Diet DIET DYSPHAGIA SOFT AND BITE-SIZED;   DVT Prophylaxis [] Lovenox, []  Heparin, [] SCDs, [] Ambulation   GI Prophylaxis [] PPI,  [] H2 Blocker,  [] Carafate,  [] Diet/Tube Feeds   Code Status Prior   Disposition TBD   MDM      History of Present Illness:     Patient was seen and examined  Denied any chest pain or abdominal pain  Seems to be still confused     Ten point ROS reviewed negative, unless as noted above    Objective:   No intake or output data in the 24 hours ending 20 1323   Vitals:   Vitals:    20 1101   BP: 104/66 Pulse: 100   Resp: 18   Temp: 97.8 °F (36.6 °C)   SpO2: 96%     Physical Exam:   GEN Awake male, laying in bed in no apparent distress. Appears given age. EYES Pupils are equally round. No scleral erythema, discharge, or conjunctivitis. HENT Mucous membranes are moist. Oral pharynx without exudates, no evidence of thrush. NECK Supple, no apparent thyromegaly or masses. RESP Clear to auscultation, no wheezes, rales or rhonchi. Symmetric chest movement while on room air. CARDIO/VASC S1/S2 auscultated. Regular rate without appreciable murmurs, rubs, or gallops. No JVD or carotid bruits. Peripheral pulses equal bilaterally and palpable. No peripheral edema. GI Abdomen is soft without significant tenderness, masses, or guarding. Bowel sounds are normoactive. Rectal exam deferred.  No costovertebral angle tenderness. Normal appearing external genitalia. Mayen catheter is not present. HEME/LYMPH No palpable cervical lymphadenopathy and no hepatosplenomegaly. No petechiae or ecchymoses. MSK No gross joint deformities. SKIN Normal coloration, warm, dry.   NEURO Confused but no focal deficit    Medications:   Medications:    ziprasidone  40 mg Oral BID     amLODIPine  10 mg Oral Daily    labetalol  10 mg Intravenous Once    [Held by provider] aspirin-dipyridamole  1 capsule Oral Daily    atorvastatin  80 mg Oral Daily    DULoxetine  30 mg Oral Daily    levothyroxine  75 mcg Oral Daily    lisinopril  10 mg Oral Daily    melatonin  3 mg Oral Nightly    metoprolol succinate  100 mg Oral Daily    tamsulosin  0.4 mg Oral Daily    lacosamide  200 mg Oral BID    lamoTRIgine  250 mg Oral BID    insulin lispro  0-12 Units Subcutaneous TID     insulin lispro  0-6 Units Subcutaneous Nightly      Infusions:    dextrose       PRN Meds: hydrALAZINE, 10 mg, Q6H PRN  glucose, 15 g, PRN  dextrose, 12.5 g, PRN  glucagon (rDNA), 1 mg, PRN  dextrose, 100 mL/hr, PRN          Electronically signed by Agata Liz Angelo Nix MD on 1/31/2020 at 1:23 PM

## 2020-02-01 ENCOUNTER — CARE COORDINATION (OUTPATIENT)
Dept: CASE MANAGEMENT | Age: 65
End: 2020-02-01

## 2020-02-01 LAB
GLUCOSE BLD-MCNC: 132 MG/DL (ref 70–99)
GLUCOSE BLD-MCNC: 159 MG/DL (ref 70–99)
GLUCOSE BLD-MCNC: 237 MG/DL (ref 70–99)

## 2020-02-01 PROCEDURE — 1240000000 HC EMOTIONAL WELLNESS R&B

## 2020-02-01 PROCEDURE — 6370000000 HC RX 637 (ALT 250 FOR IP): Performed by: PSYCHIATRY & NEUROLOGY

## 2020-02-01 PROCEDURE — 99223 1ST HOSP IP/OBS HIGH 75: CPT | Performed by: PSYCHIATRY & NEUROLOGY

## 2020-02-01 PROCEDURE — 82962 GLUCOSE BLOOD TEST: CPT

## 2020-02-01 RX ORDER — RISPERIDONE 2 MG/1
2 TABLET, FILM COATED ORAL 2 TIMES DAILY
Status: DISCONTINUED | OUTPATIENT
Start: 2020-02-01 | End: 2020-02-02

## 2020-02-01 RX ORDER — LORAZEPAM 1 MG/1
1 TABLET ORAL 2 TIMES DAILY
Status: DISCONTINUED | OUTPATIENT
Start: 2020-02-01 | End: 2020-02-11

## 2020-02-01 RX ADMIN — TAMSULOSIN HYDROCHLORIDE 0.4 MG: 0.4 CAPSULE ORAL at 08:43

## 2020-02-01 RX ADMIN — RISPERIDONE 2 MG: 2 TABLET ORAL at 21:11

## 2020-02-01 RX ADMIN — ZIPRASIDONE HYDROCHLORIDE 60 MG: 20 CAPSULE ORAL at 08:43

## 2020-02-01 RX ADMIN — LORAZEPAM 1 MG: 1 TABLET ORAL at 10:00

## 2020-02-01 RX ADMIN — MELATONIN TAB 3 MG 3 MG: 3 TAB at 21:11

## 2020-02-01 RX ADMIN — LORAZEPAM 1 MG: 1 TABLET ORAL at 17:07

## 2020-02-01 RX ADMIN — DULOXETINE HYDROCHLORIDE 30 MG: 30 CAPSULE, DELAYED RELEASE ORAL at 08:44

## 2020-02-01 RX ADMIN — METOPROLOL SUCCINATE 100 MG: 50 TABLET, EXTENDED RELEASE ORAL at 08:43

## 2020-02-01 RX ADMIN — ATORVASTATIN CALCIUM 80 MG: 40 TABLET, FILM COATED ORAL at 08:44

## 2020-02-01 RX ADMIN — HALOPERIDOL 5 MG: 5 TABLET ORAL at 10:00

## 2020-02-01 RX ADMIN — LACOSAMIDE 200 MG: 50 TABLET, FILM COATED ORAL at 17:07

## 2020-02-01 RX ADMIN — LAMOTRIGINE 250 MG: 100 TABLET ORAL at 08:42

## 2020-02-01 RX ADMIN — AMLODIPINE BESYLATE 10 MG: 5 TABLET ORAL at 08:44

## 2020-02-01 RX ADMIN — LAMOTRIGINE 250 MG: 100 TABLET ORAL at 21:10

## 2020-02-01 RX ADMIN — LORAZEPAM 1 MG: 1 TABLET ORAL at 21:11

## 2020-02-01 RX ADMIN — LACOSAMIDE 200 MG: 50 TABLET, FILM COATED ORAL at 21:13

## 2020-02-01 RX ADMIN — LISINOPRIL 10 MG: 10 TABLET ORAL at 08:43

## 2020-02-01 RX ADMIN — LEVOTHYROXINE SODIUM 75 MCG: 0.07 TABLET ORAL at 08:42

## 2020-02-01 RX ADMIN — RISPERIDONE 2 MG: 2 TABLET ORAL at 17:07

## 2020-02-01 ASSESSMENT — SLEEP AND FATIGUE QUESTIONNAIRES
DO YOU USE A SLEEP AID: YES
DO YOU HAVE DIFFICULTY SLEEPING: NO
SLEEP PATTERN: UTA
AVERAGE NUMBER OF SLEEP HOURS: 8

## 2020-02-01 ASSESSMENT — PAIN SCALES - PAIN ASSESSMENT IN ADVANCED DEMENTIA (PAINAD)
CONSOLABILITY: 1
NEGVOCALIZATION: 0
BREATHING: 0
TOTALSCORE: 2
BODYLANGUAGE: 1
FACIALEXPRESSION: 0

## 2020-02-01 ASSESSMENT — PAIN SCALES - WONG BAKER: WONGBAKER_NUMERICALRESPONSE: 0

## 2020-02-01 ASSESSMENT — LIFESTYLE VARIABLES: HISTORY_ALCOHOL_USE: NO

## 2020-02-01 ASSESSMENT — PAIN SCALES - GENERAL: PAINLEVEL_OUTOF10: 0

## 2020-02-01 NOTE — PLAN OF CARE
Time frame for Long-Term Goals: 10 days  Members Present in Team Meeting: See Signature [id number] DeysiW, LSW

## 2020-02-01 NOTE — GROUP NOTE
Group Therapy Note    Date: 2/1/2020    Group Start Time: 1100  Group End Time: 8839  Group Topic: Recreational    [address]    Miguel Ángel Rausch, MSW, LSW; LUCY Carroll        Group Therapy Note    Attendees: 3/7       Notes:  Pts participated in recreational therapy group; Bingo Activity. Pts were encouraged to engage in a leisure activity to promote socialization and positive mood. Pt sleeping at this time.  Per discussion with nursing, pt allowed to continue to rest.       Discipline Responsible: Certified Therapeutic Recreation Specialist      Signature:  David Ro, [address] [place] [address]

## 2020-02-01 NOTE — H&P
Initial Psychiatric History and Physical    Maryse Stallworth  [id number]  1/31/2020 02/01/20    ID: Patient is a 60 yrs y.o. male    CC: I am depressed. Juan Mao I keep hearing voices    HPI: Pt is a 60 you  male who presents for exacerbation of schizophrenia with suicidal ideations and depression severe. Pt noted recent exacarbation of psychosis and mood. Pt noted he currently feels safe and comfortable on the unit. Pt was in agreement with treatment team.  Pt was polite and cordial during the interview process. Pt agreed to start risperidone, to consider paliperidone long acting injectable. Pt was in agreement. Per ED: Maurisio López DO)  Pt presented  with complaint of altered mental status. Unknown last known well. Lives in group home home health care nurse noticed today patient is confused not at baseline patient keeps repeating nurse does have elevated blood pressure otherwise he has no complaints but is confused no family or staff present from group home patient denies other questions or concerns he is resting comfortably altered mental status work-up performed    Pt noted he is doing [place] today. \"  Pt noted he is sleeping \"okay. ..about 6 hours last night. \"  Pt noted his apptetite is getting better. Pt rated his depresssion a \"6, on a scale of zero to ten with ten being the worst and zero being none. Pt rated his anxiety a \"5,\" on the same scale. Pt noted passive thoughts to harm himself at this time. Pt denied any thoughts to harm anyone else. Pt denied any auditory or visiual hallucintations. Pt denied any hx of TBIs, Hep C or HIV. Pt noted hx of seizures. No TD noted, AIMS=2    Pt noted hx of previous inpt psychiatric admissions  Pt denied any previous suicide attempts  Pt denied any family hx of suicides  Pt noted his grandmother had a hx of mental illness    Pt denied any hx of abuse trauma or neglect.       Alcohol: Denies any current  Street drugs: denies any current  Tobacco: 1 PPD  Caffeine: 2-3 per day      Past Psychiatric History:   See note above    Family Psychiatric History:   See note above    Family Psychiatric History:   Family History   Problem Relation Age of Onset    Cancer Mother     No Known Problems Father         strange to father    Cancer Sister         Allergies:  No Known Allergies     OBJECTIVE  Vital Signs:  Vitals:    02/01/20 0912   BP: 130/76   Pulse: 116   Resp: 20   Temp: 96 °F (35.6 °C)   SpO2: 95%       Labs:  Recent Results (from the past 48 hour(s))   POCT Glucose    Collection Time: 01/30/20  4:33 PM   Result Value Ref Range    POC Glucose 183 (H) 70 - 99 MG/DL   POCT Glucose    Collection Time: 01/30/20  9:29 PM   Result Value Ref Range    POC Glucose 121 (H) 70 - 99 MG/DL   POCT Glucose    Collection Time: 01/31/20  8:16 AM   Result Value Ref Range    POC Glucose 139 (H) 70 - 99 MG/DL   POCT Glucose    Collection Time: 01/31/20 11:56 AM   Result Value Ref Range    POC Glucose 136 (H) 70 - 99 MG/DL   POCT Glucose    Collection Time: 01/31/20 12:11 PM   Result Value Ref Range    POC Glucose 132 (H) 70 - 99 MG/DL   POCT Glucose    Collection Time: 01/31/20  4:58 PM   Result Value Ref Range    POC Glucose 142 (H) 70 - 99 MG/DL   POCT Glucose    Collection Time: 01/31/20  8:07 PM   Result Value Ref Range    POC Glucose 108 (H) 70 - 99 MG/DL   POCT Glucose    Collection Time: 02/01/20  7:51 AM   Result Value Ref Range    POC Glucose 159 (H) 70 - 99 MG/DL       Review of Systems:  Reports of no current cardiovascular, respiratory, gastrointestinal, genitourinary, integumentary, neurological, muscuoskeletal, or immunological symptoms today. PSYCHIATRIC: See HPI above. Neurologic examination:  Mental status: The patient is alert, attentive, and oriented. Speech is clear and fluent with good repetition, comprehension, and naming. She recalls 3/3 objects at 5 minutes. ranial nerves:  CN II: Visual fields are full to confrontation. Fundoscopic exam is normal with sharp discs and no vascular changes. Venous pulsations are present bilaterally. Pupils are 4 mm and briskly reactive to light. Visual acuity is 20/20 bilaterally. CN III, IV, VI: At primary gaze, there is no eye deviation. When the patient is looking to the left, the right eye does not adduct. When the patient is looking up, the right eye does not move up as well as the left. She develops horizontal diplopia in all directions of gaze especially when looking to the left. There is ptosis of the right eye. Convergence is impaired. CN V: Facial sensation is intact to pinprick in all 3 divisions bilaterally. Corneal responses are intact. CN VII: Face is symmetric with normal eye closure and smile. CN VII: Hearing is normal to rubbing fingers    CN IX, X: Palate elevates symmetrically. Phonation is normal.    CN XI: Head turning and shoulder shrug are intact    CN XII: Tongue is midline with normal movements and no atrophy. Motor: There is no pronator drift of out-stretched arms. Muscle bulk and tone are normal. Strength is full bilaterally. Reflexes:  Reflexes are 2+ and symmetric at the biceps, triceps, knees, and ankles. Plantar responses are flexor. Sensory:  Light touch, pinprick, position sense, and vibration sense are intact in fingers and toes. Coordination:  Rapid alternating movements and fine finger movements are intact. There is no dysmetria on finger-to-nose and heel-knee-shin. There are no abnormal or extraneous movements. Romberg is absent. Gait/Stance:  Posture is normal. Gait is steady with normal steps, base, arm swing, and turning. Heel and toe walking are normal. Tandem gait is normal when the patient closes one of her eyes.         PSYCHIATRIC EXAMINATION / MENTAL STATUS EXAM    CONSTITUTIONAL:    Vitals:   Vitals:    02/01/20 0912   BP: 130/76   Pulse: 116   Resp: 20   Temp: 96 °F (35.6 °C)   SpO2: 95%      General appearance: [x] appears length of stay 10-12 days  7. I certify that inpatient psychiatric hospital admission is medically necessary for treatment, which can be expected to improve the patient's condition, and/or for diagnostic study. 8. Psychiatric management:medication initiation and titration, group and individual therapy, safe and theraputic environment. 9. Status of problem/condition: ?Improving  10. Medical co-morbidities: Management per [facility] group, appreciate assistance  11. Legal Status:Pending  12. The treatment team reviewed with the patient the diagnosis and treatment recommendations to include the risks, benefits, and side effects of chosen medications. 13. The patient verbalized understanding and agreed with the treatment regimen as outlined above. 14. The patient was encouraged to participate in groups. 15. Medical records, Labs, Diagnotic tests reviewed  16. q15 min safety checks for safety  17. Interval History. 18. Review current labs  19. Start risperidone 2 mg PO BID for mood  20. Start ativan 1 mg PO BID for mood and anxiety  21. D/C geodon  22. Continue current medications  23. Supportive Therapy Provided  24. Pt had an opportunity to ask questions and address concerns  25. Pt encouraged to continue therapy group or individual.  26. Pt was in agreement with treatment plan. 27. The risks benefits and side effects of medications were discussed with the patient, including alternatives and no treatment.           Electronically signed by Danielle Alexandra DO on 2/1/2020 at 1:31 PM

## 2020-02-01 NOTE — PROGRESS NOTES
Patient resting quietly and appears to be sleeping, patient will not be awakened for accu check or lunch due to earlier level of agitation and prn medication effectiveness.

## 2020-02-01 NOTE — PLAN OF CARE
Problem: Falls - Risk of:  Goal: Will remain free from falls  Description  Will remain free from falls  Outcome: Ongoing  Goal: Absence of physical injury  Description  Absence of physical injury  Outcome: Ongoing     Problem: Altered Mood, Psychotic Behavior:  Goal: Able to demonstrate trust by eating, participating in treatment and following staff's direction  Description  Able to demonstrate trust by eating, participating in treatment and following staff's direction  Outcome: Ongoing  Goal: Able to verbalize decrease in frequency and intensity of hallucinations  Description  Able to verbalize decrease in frequency and intensity of hallucinations  Outcome: Ongoing  Goal: Able to verbalize reality based thinking  Description  Able to verbalize reality based thinking  Outcome: Ongoing  Goal: Absence of self-harm  Description  Absence of self-harm  Outcome: Ongoing  Goal: Ability to achieve adequate nutritional intake will improve  Description  Ability to achieve adequate nutritional intake will improve  Outcome: Ongoing  Goal: Ability to interact with others will improve  Description  Ability to interact with others will improve  Outcome: Ongoing  Goal: Patient specific goal  Description  Patient specific goal  Outcome: Ongoing

## 2020-02-01 NOTE — CARE COORDINATION
9:45am attempted to complete initial  assessment for discharge planning patient was unable to answer questions at this time. Patient repeated left, right, right left to questions during interview. 2:00pm Unable to complete initial assessment due to patient being asleep.

## 2020-02-01 NOTE — GROUP NOTE
Group Therapy Note    Date: 2/1/2020    Group Start Time: 3354  Group End Time: 0229  Group Topic: Recreational    [address]    TOÑITO Navas LSW; LUCY Ellis    Group Therapy Note    Attendees: 5/8       Patient's Goal: \"Right, Left\"    Notes: Patient was asleep during group and was unable to participate.      Discipline Responsible: /Counselor    Signature:  TOÑITO Navas LSW

## 2020-02-01 NOTE — BH NOTE
[facility]  Admission Note     Admission Type:   Admission Type:  Involuntary    Reason for admission:  Reason for Admission: Recent seizure resulting in decrease in functioning, suspected exacerbation of schizophrenia    PATIENT STRENGTHS:  Strengths: Medication Compliance    Patient Strengths and Limitations:  Limitations: (LIVE)    Addictive Behavior:   Addictive Behavior  In the past 3 months, have you felt or has someone told you that you have a problem with:  : Other(Comments)(LIVE)  Do you have a history of Chemical Use?: No  Do you have a history of Alcohol Use?: No  Do you have a history of Street Drug Abuse?: No  Histroy of Prescripton Drug Abuse?: No    Medical Problems:   Past Medical History:   Diagnosis Date    Avascular necrosis of bone ([address]     right hip    CAD (coronary artery disease)     Coronary atherosclerosis     Dementia     Dementia ([address]     Dizziness     cerebelar atrophy    Environmental allergies     Essential hypertension     H/O Doppler ultrasound 07/11/2016    carotid - normal    IBS (irritable bowel syndrome)     Mental disability     Recurrent falls     S/P PTCA (percutaneous transluminal coronary angioplasty) 06/16/2016    Cx OM stent    Seizure disorder [facility] [address]     Urge incontinence of urine     Wrist joint pain     chronic left       Status EXAM:  Status and Exam  Normal: No  Facial Expression: Flat  Affect: Blunt  Level of Consciousness: Alert  Mood:Normal: No  Mood: Other (Comment)(Neutral - pt unable to respond to assessment questions)  Motor Activity:Normal: No  Motor Activity: Decreased, Unusual Posture/Gait  Interview Behavior: Uncooperative/Withdrawn  Preception: [place]in[place] to Person  Attention:Normal: No  Attention: Unable to Concentrate  Thought Processes: Other(See comment)(LIVE)  Thought Content:Normal: No  Thought Content: Other(See Comment)(LIVE)  Hallucinations: Unable to assess  Delusions: (LIVE)  Memory:Normal: (LIVE)  Insight and Judgment: (LIVE)  Present Suicidal Ideation: No  Present Homicidal Ideation: No    Tobacco Screening:  Practical Counseling, on admission, heydi X, if applicable and completed (first 3 are required if patient doesn't refuse):            ( )  Recognizing danger situations (included triggers and roadblocks)                    ( )  Coping skills (new ways to manage stress, exercise, relaxation techniques, changing routine, distraction)                                                           ( )  Basic information about quitting (benefits of quitting, techniques in how to quit, available resources  ( ) Referral for counseling faxed to [facility]                                           ( ) Patient refused counseling  ( ) Patient has not smoked in the last 30 days      Pt admitted with followings belongings:  Dentures: None  Vision - Corrective Lenses: Glasses  Hearing Aid: None  Jewelry: Watch, Ring  Body Piercings Removed: N/A  Clothing: Footwear, Jacket / coat, Pants, Shirt  Were All Patient Medications Collected?: Not Applicable  Other Valuables: None     Valuables placed in locker 1045V with code 1234. Patient oriented to surroundings. Patient unable to review and sign consents at time of admission.             Robby Dailey RN

## 2020-02-02 LAB
GLUCOSE BLD-MCNC: 137 MG/DL (ref 70–99)
GLUCOSE BLD-MCNC: 155 MG/DL (ref 70–99)
GLUCOSE BLD-MCNC: 212 MG/DL (ref 70–99)
GLUCOSE BLD-MCNC: 223 MG/DL (ref 70–99)

## 2020-02-02 PROCEDURE — 82962 GLUCOSE BLOOD TEST: CPT

## 2020-02-02 PROCEDURE — 99232 SBSQ HOSP IP/OBS MODERATE 35: CPT | Performed by: PSYCHIATRY & NEUROLOGY

## 2020-02-02 PROCEDURE — 6370000000 HC RX 637 (ALT 250 FOR IP): Performed by: PSYCHIATRY & NEUROLOGY

## 2020-02-02 PROCEDURE — 1240000000 HC EMOTIONAL WELLNESS R&B

## 2020-02-02 RX ORDER — BENZTROPINE MESYLATE 1 MG/1
1 TABLET ORAL 2 TIMES DAILY
Status: DISCONTINUED | OUTPATIENT
Start: 2020-02-02 | End: 2020-02-25 | Stop reason: HOSPADM

## 2020-02-02 RX ORDER — ZIPRASIDONE HYDROCHLORIDE 40 MG/1
80 CAPSULE ORAL 2 TIMES DAILY WITH MEALS
Status: DISCONTINUED | OUTPATIENT
Start: 2020-02-02 | End: 2020-02-09

## 2020-02-02 RX ADMIN — LEVOTHYROXINE SODIUM 75 MCG: 0.07 TABLET ORAL at 06:04

## 2020-02-02 RX ADMIN — ZIPRASIDONE HYDROCHLORIDE 80 MG: 40 CAPSULE ORAL at 16:54

## 2020-02-02 RX ADMIN — METOPROLOL SUCCINATE 100 MG: 50 TABLET, EXTENDED RELEASE ORAL at 08:07

## 2020-02-02 RX ADMIN — BENZTROPINE MESYLATE 1 MG: 1 TABLET ORAL at 13:10

## 2020-02-02 RX ADMIN — ATORVASTATIN CALCIUM 80 MG: 40 TABLET, FILM COATED ORAL at 08:06

## 2020-02-02 RX ADMIN — AMLODIPINE BESYLATE 10 MG: 5 TABLET ORAL at 08:07

## 2020-02-02 RX ADMIN — LORAZEPAM 1 MG: 1 TABLET ORAL at 21:25

## 2020-02-02 RX ADMIN — RISPERIDONE 2 MG: 2 TABLET ORAL at 08:07

## 2020-02-02 RX ADMIN — LACOSAMIDE 200 MG: 50 TABLET, FILM COATED ORAL at 08:08

## 2020-02-02 RX ADMIN — LACOSAMIDE 200 MG: 50 TABLET, FILM COATED ORAL at 21:26

## 2020-02-02 RX ADMIN — MELATONIN TAB 3 MG 3 MG: 3 TAB at 21:26

## 2020-02-02 RX ADMIN — LAMOTRIGINE 250 MG: 100 TABLET ORAL at 21:25

## 2020-02-02 RX ADMIN — DULOXETINE HYDROCHLORIDE 30 MG: 30 CAPSULE, DELAYED RELEASE ORAL at 08:07

## 2020-02-02 RX ADMIN — TAMSULOSIN HYDROCHLORIDE 0.4 MG: 0.4 CAPSULE ORAL at 08:07

## 2020-02-02 RX ADMIN — LAMOTRIGINE 250 MG: 100 TABLET ORAL at 08:06

## 2020-02-02 RX ADMIN — BENZTROPINE MESYLATE 1 MG: 1 TABLET ORAL at 21:26

## 2020-02-02 RX ADMIN — LISINOPRIL 10 MG: 10 TABLET ORAL at 08:07

## 2020-02-02 RX ADMIN — LORAZEPAM 1 MG: 1 TABLET ORAL at 08:07

## 2020-02-02 ASSESSMENT — PAIN SCALES - PAIN ASSESSMENT IN ADVANCED DEMENTIA (PAINAD)
TOTALSCORE: 0
NEGVOCALIZATION: 0
BODYLANGUAGE: 0
FACIALEXPRESSION: 0
BREATHING: 0
BREATHING: 0
CONSOLABILITY: 0

## 2020-02-02 NOTE — PLAN OF CARE
regimen will improve  Description  Compliance with prescribed medication regimen will improve  2/1/2020 2314 by Herrera Maxwell RN  Outcome: Ongoing  2/1/2020 1810 by Rosa White LPN  Outcome: Ongoing  Goal: Patient specific goal  Description  Patient specific goal  2/1/2020 2314 by Herrera Maxwell RN  Outcome: Ongoing  2/1/2020 1810 by Rosa White LPN  Outcome: Ongoing     Problem: Risk for Impaired Skin Integrity  Goal: Tissue integrity - skin and mucous membranes  Description  Structural intactness and normal physiological function of skin and  mucous membranes.   2/1/2020 2314 by Herrera Maxwell RN  Outcome: Ongoing  2/1/2020 1810 by Rosa White LPN  Outcome: Ongoing

## 2020-02-02 NOTE — GROUP NOTE
Group Therapy Note    Date: 2/2/2020    Group Start Time: 1500  Group End Time: 1501    Number of Participants: 4/9    Type: Exercise/Recreation Group    Group Topic/Objective: Balloon Toss    Notes:  Pt sleeping.  Per discussion with nursing, pt allowed to continue to rest.     Discipline Responsible: Certified Therapeutic Recreation Specialist    Electronically signed by Sumanth Herzog MA on 2/2/2020 at 3:44 PM

## 2020-02-02 NOTE — PLAN OF CARE
Problem: Falls - Risk of:  Goal: Will remain free from falls  Description  Will remain free from falls  Outcome: Ongoing  Goal: Absence of physical injury  Description  Absence of physical injury  Outcome: Ongoing     Problem: Altered Mood, Psychotic Behavior:  Goal: Able to demonstrate trust by eating, participating in treatment and following staff's direction  Description  Able to demonstrate trust by eating, participating in treatment and following staff's direction  Outcome: Ongoing  Goal: Able to verbalize decrease in frequency and intensity of hallucinations  Description  Able to verbalize decrease in frequency and intensity of hallucinations  Outcome: Ongoing  Goal: Able to verbalize reality based thinking  Description  Able to verbalize reality based thinking  Outcome: Ongoing  Goal: Absence of self-harm  Description  Absence of self-harm  Outcome: Ongoing  Goal: Ability to achieve adequate nutritional intake will improve  Description  Ability to achieve adequate nutritional intake will improve  Outcome: Ongoing  Goal: Ability to interact with others will improve  Description  Ability to interact with others will improve  Outcome: Ongoing  Goal: Compliance with prescribed medication regimen will improve  Description  Compliance with prescribed medication regimen will improve  Outcome: Ongoing  Goal: Patient specific goal  Description  Patient specific goal  Outcome: Ongoing     Problem: Risk for Impaired Skin Integrity  Goal: Tissue integrity - skin and mucous membranes  Description  Structural intactness and normal physiological function of skin and  mucous membranes.   Outcome: Ongoing

## 2020-02-02 NOTE — CARE COORDINATION
10:22am  Attempted to complete assessment with patient. Was able to answer some questions and then became fixated on how to make coffee and slamming doors. DISCHARGE BARRIERS     Reason for Referral: [facility] initial assessment  Decision Making Ability: Yes  Family/Social/Home Environment: spoke with patient who states the patient currently resides with 5 other people in a group home. Patient reported group home staff as a support systems in place. Current Services/ Equipment: unable to assess at this time. Patient Income source: unable to assess at this time  Concerns or Barriers to Discharge: no  Patient and/or family verbalized understanding of the plan of care and contributed to goal setting. Social/ Functional History  Lives With: 5 other people  Type of Home: Patient reported Group Home  Emergency Contact reported [address]: one level  Home Access: level entry  Bathroom Shower/ Tub: unable to assess at this time  [employer]: unable to assess at this time  Other [address]:    Home Equipment:   ADL Assistance: unable to assess at this time  [address]: unable to assess at this time  Ambulation Assistance: unable to assess at this time  Transfer Assistance: unable to assess at this time  Additional Comments:      Anticipated Needs per Patient:   Potential Assistance Needed: None  Type of Home Care Services: None  Patient expects to be discharged to: [address] [place] [address]     Discharge Plan:  Discharge to [address] [place], [place] [address]nirav, TOÑITO, YOGESHW                10:40am Called emergency contact Zohra to get more information on patient. She stated that the patient does not live in a group home. He rents a house and has one roommate. She stated that he receives services through consumer support services that provide him with 24 hour care in his home. Patient also [facility]. He has a  through [employer] [facility]).   Zohra reported that

## 2020-02-02 NOTE — GROUP NOTE
Group Therapy Note    Date: 2/2/2020    Group Start Time: 0845  Group End Time: 6863    Number of Participants: 6/9    Type: Morning Goals Group/ Community Meeting    Group Topic/Objective: Set Goal For The Day and to review Unit Rules and Regulations. Patient's Goal:  Pt reports his goal is to make his bed. Notes:  Pt minimally engaged in group, but would respond if directly addressed. Pt states he feels \"alright\" and is grateful for \"nights of swimming. \"  Pt reports feeling he slept well. Pt unable to give a numerical value at this time. Depression (0-10): 0    Anxiety (0-10): Pt reports feeling anxious, but unable to rate at this time. Irritability/Aggitation (0-10): Pt reports irritability, but unable to rate at this time.      Status After Intervention:  Unchanged    Participation Level: Minimal    Participation Quality: Sharing; with prompting    Speech:  pressured and loud    Thought Process/Content: Logical    Affective Functioning: Blunted    Mood: Blunted    Level of consciousness:  Inattentive    Response to Learning: Able to verbalize current knowledge/experience    Endings: None Reported    Modes of Intervention: Education, Support and Socialization    Discipline Responsible: Certified Therapeutic Recreation Specialist    Electronically signed by Saba Worley MA on 2/2/2020 at 9:19 AM

## 2020-02-02 NOTE — PROGRESS NOTES
Attempted multiple times to complete pt's assessment. Pt sleeping and per discussion with nursing pt allowed to continue to rest. Therapist will attempt again at a later time.

## 2020-02-03 LAB
ANION GAP SERPL CALCULATED.3IONS-SCNC: 7 MMOL/L (ref 4–16)
BUN BLDV-MCNC: 19 MG/DL (ref 6–23)
CALCIUM SERPL-MCNC: 9.6 MG/DL (ref 8.3–10.6)
CHLORIDE BLD-SCNC: 99 MMOL/L (ref 99–110)
CO2: 29 MMOL/L (ref 21–32)
CREAT SERPL-MCNC: 1.3 MG/DL (ref 0.9–1.3)
EKG ATRIAL RATE: 88 BPM
EKG DIAGNOSIS: NORMAL
EKG P AXIS: 50 DEGREES
EKG P-R INTERVAL: 176 MS
EKG Q-T INTERVAL: 344 MS
EKG QRS DURATION: 78 MS
EKG QTC CALCULATION (BAZETT): 416 MS
EKG R AXIS: 3 DEGREES
EKG T AXIS: 48 DEGREES
EKG VENTRICULAR RATE: 88 BPM
GFR AFRICAN AMERICAN: >60 ML/MIN/1.73M2
GFR NON-AFRICAN AMERICAN: 56 ML/MIN/1.73M2
GLUCOSE BLD-MCNC: 126 MG/DL (ref 70–99)
GLUCOSE BLD-MCNC: 129 MG/DL (ref 70–99)
GLUCOSE BLD-MCNC: 143 MG/DL (ref 70–99)
GLUCOSE BLD-MCNC: 151 MG/DL (ref 70–99)
GLUCOSE BLD-MCNC: 200 MG/DL (ref 70–99)
HCT VFR BLD CALC: 47.1 % (ref 42–52)
HEMOGLOBIN: 15.6 GM/DL (ref 13.5–18)
MCH RBC QN AUTO: 31.4 PG (ref 27–31)
MCHC RBC AUTO-ENTMCNC: 33.1 % (ref 32–36)
MCV RBC AUTO: 94.8 FL (ref 78–100)
PDW BLD-RTO: 12.4 % (ref 11.7–14.9)
PLATELET # BLD: 264 K/CU MM (ref 140–440)
PMV BLD AUTO: 9.8 FL (ref 7.5–11.1)
POTASSIUM SERPL-SCNC: 4.6 MMOL/L (ref 3.5–5.1)
RBC # BLD: 4.97 M/CU MM (ref 4.6–6.2)
SODIUM BLD-SCNC: 135 MMOL/L (ref 135–145)
WBC # BLD: 6.6 K/CU MM (ref 4–10.5)

## 2020-02-03 PROCEDURE — 1240000000 HC EMOTIONAL WELLNESS R&B

## 2020-02-03 PROCEDURE — 99233 SBSQ HOSP IP/OBS HIGH 50: CPT | Performed by: NURSE PRACTITIONER

## 2020-02-03 PROCEDURE — 85027 COMPLETE CBC AUTOMATED: CPT

## 2020-02-03 PROCEDURE — 82962 GLUCOSE BLOOD TEST: CPT

## 2020-02-03 PROCEDURE — 80048 BASIC METABOLIC PNL TOTAL CA: CPT

## 2020-02-03 PROCEDURE — 6370000000 HC RX 637 (ALT 250 FOR IP): Performed by: PSYCHIATRY & NEUROLOGY

## 2020-02-03 PROCEDURE — 36415 COLL VENOUS BLD VENIPUNCTURE: CPT

## 2020-02-03 RX ORDER — DEXTROSE MONOHYDRATE 25 G/50ML
12.5 INJECTION, SOLUTION INTRAVENOUS PRN
Status: DISCONTINUED | OUTPATIENT
Start: 2020-02-03 | End: 2020-02-25 | Stop reason: HOSPADM

## 2020-02-03 RX ORDER — ASPIRIN AND DIPYRIDAMOLE 25; 200 MG/1; MG/1
1 CAPSULE, EXTENDED RELEASE ORAL NIGHTLY
Status: DISCONTINUED | OUTPATIENT
Start: 2020-02-03 | End: 2020-02-25 | Stop reason: HOSPADM

## 2020-02-03 RX ORDER — DEXTROSE MONOHYDRATE 50 MG/ML
100 INJECTION, SOLUTION INTRAVENOUS PRN
Status: DISCONTINUED | OUTPATIENT
Start: 2020-02-03 | End: 2020-02-25 | Stop reason: HOSPADM

## 2020-02-03 RX ORDER — NICOTINE POLACRILEX 4 MG
15 LOZENGE BUCCAL PRN
Status: DISCONTINUED | OUTPATIENT
Start: 2020-02-03 | End: 2020-02-25 | Stop reason: HOSPADM

## 2020-02-03 RX ADMIN — LACOSAMIDE 200 MG: 50 TABLET, FILM COATED ORAL at 07:59

## 2020-02-03 RX ADMIN — TAMSULOSIN HYDROCHLORIDE 0.4 MG: 0.4 CAPSULE ORAL at 08:00

## 2020-02-03 RX ADMIN — ZIPRASIDONE HYDROCHLORIDE 80 MG: 40 CAPSULE ORAL at 17:28

## 2020-02-03 RX ADMIN — BENZTROPINE MESYLATE 1 MG: 1 TABLET ORAL at 07:58

## 2020-02-03 RX ADMIN — LAMOTRIGINE 250 MG: 100 TABLET ORAL at 21:12

## 2020-02-03 RX ADMIN — AMLODIPINE BESYLATE 10 MG: 5 TABLET ORAL at 07:58

## 2020-02-03 RX ADMIN — METOPROLOL SUCCINATE 100 MG: 50 TABLET, EXTENDED RELEASE ORAL at 08:00

## 2020-02-03 RX ADMIN — LORAZEPAM 1 MG: 1 TABLET ORAL at 21:13

## 2020-02-03 RX ADMIN — BENZTROPINE MESYLATE 1 MG: 1 TABLET ORAL at 21:12

## 2020-02-03 RX ADMIN — ZIPRASIDONE HYDROCHLORIDE 80 MG: 40 CAPSULE ORAL at 08:01

## 2020-02-03 RX ADMIN — LISINOPRIL 10 MG: 10 TABLET ORAL at 07:59

## 2020-02-03 RX ADMIN — MELATONIN TAB 3 MG 3 MG: 3 TAB at 21:13

## 2020-02-03 RX ADMIN — DULOXETINE HYDROCHLORIDE 30 MG: 30 CAPSULE, DELAYED RELEASE ORAL at 07:59

## 2020-02-03 RX ADMIN — ATORVASTATIN CALCIUM 80 MG: 40 TABLET, FILM COATED ORAL at 07:58

## 2020-02-03 RX ADMIN — LAMOTRIGINE 250 MG: 100 TABLET ORAL at 07:57

## 2020-02-03 RX ADMIN — LACOSAMIDE 200 MG: 50 TABLET, FILM COATED ORAL at 21:12

## 2020-02-03 RX ADMIN — LORAZEPAM 1 MG: 1 TABLET ORAL at 08:00

## 2020-02-03 RX ADMIN — LEVOTHYROXINE SODIUM 75 MCG: 0.07 TABLET ORAL at 06:30

## 2020-02-03 ASSESSMENT — PAIN SCALES - GENERAL: PAINLEVEL_OUTOF10: 0

## 2020-02-03 NOTE — PLAN OF CARE
Problem: Falls - Risk of:  Goal: Will remain free from falls  Description  Will remain free from falls  2/3/2020 0254 by Harjeet Esteves RN  Outcome: Ongoing  2/3/2020 0106 by Harjeet Esteves RN  Outcome: Ongoing  2/2/2020 1451 by Elmira George LPN  Outcome: Ongoing  Goal: Absence of physical injury  Description  Absence of physical injury  2/3/2020 0254 by Harjeet Esteves RN  Outcome: Ongoing  2/3/2020 0106 by Harjeet Esteves RN  Outcome: Ongoing  2/2/2020 1451 by Elmira George LPN  Outcome: Ongoing     Problem: Altered Mood, Psychotic Behavior:  Goal: Able to demonstrate trust by eating, participating in treatment and following staff's direction  Description  Able to demonstrate trust by eating, participating in treatment and following staff's direction  2/3/2020 0254 by Harjeet Esteves RN  Outcome: Ongoing  2/3/2020 0106 by Harjeet Esteves RN  Outcome: Ongoing  2/2/2020 1451 by Elmira George LPN  Outcome: Ongoing  Goal: Able to verbalize decrease in frequency and intensity of hallucinations  Description  Able to verbalize decrease in frequency and intensity of hallucinations  2/3/2020 0254 by Harjeet Esteves RN  Outcome: Ongoing  2/3/2020 0106 by Harjeet Esteves RN  Outcome: Ongoing  2/2/2020 1451 by Elmira George LPN  Outcome: Ongoing  Goal: Able to verbalize reality based thinking  Description  Able to verbalize reality based thinking  2/3/2020 0254 by Harjeet Esteves RN  Outcome: Ongoing  2/3/2020 0106 by Harjeet Esteves RN  Outcome: Ongoing  2/2/2020 1451 by Elmira George LPN  Outcome: Ongoing  Goal: Absence of self-harm  Description  Absence of self-harm  2/3/2020 0254 by Harjeet Esteves RN  Outcome: Ongoing  2/3/2020 0106 by Harjeet Esteves RN  Outcome: Ongoing  2/2/2020 1451 by Elmira George LPN  Outcome: Ongoing  Goal: Ability to achieve adequate nutritional intake will improve  Description  Ability to achieve adequate nutritional intake will improve  2/3/2020 0254 by Harjeet Esteves RN  Outcome: Ongoing  2/3/2020 0106 by Benedicto Pisano RN  Outcome: Ongoing  2/2/2020 1451 by Denton Pearce LPN  Outcome: Ongoing  Goal: Ability to interact with others will improve  Description  Ability to interact with others will improve  2/3/2020 0254 by Benedicto Pisano RN  Outcome: Ongoing  2/3/2020 0106 by Benedicto Pisano RN  Outcome: Ongoing  2/2/2020 1451 by Denton Pearce LPN  Outcome: Ongoing  Goal: Compliance with prescribed medication regimen will improve  Description  Compliance with prescribed medication regimen will improve  2/3/2020 0254 by Benedicto Pisano RN  Outcome: Ongoing  2/3/2020 0106 by Benedicto Pisano RN  Outcome: Ongoing  2/2/2020 1451 by Denton Pearce LPN  Outcome: Ongoing  Goal: Patient specific goal  Description  Patient specific goal  2/3/2020 0254 by Benedicto Pisano RN  Outcome: Ongoing  2/3/2020 0106 by Benedicto Pisano RN  Outcome: Ongoing  2/2/2020 1451 by Denton Pearce LPN  Outcome: Ongoing     Problem: Risk for Impaired Skin Integrity  Goal: Tissue integrity - skin and mucous membranes  Description  Structural intactness and normal physiological function of skin and  mucous membranes.   2/3/2020 0254 by Benedicto Pisano RN  Outcome: Ongoing  2/3/2020 0106 by Benedicto Pisano RN  Outcome: Ongoing  2/2/2020 1451 by Denton Pearce LPN  Outcome: Ongoing

## 2020-02-03 NOTE — H&P
Department of Internal Medicine  Hospitalist  Attending History and Physical            History Obtained From:  electronic medical record    HISTORY OF PRESENT ILLNESS:      The patient is a 60 y.o. male with significant past medical history of dementia, MRDD, hx of seizures admitted for schizophrenia. History limited and not very talkative about bedside Has no complaints at bedside. Past Medical History:        Diagnosis Date    Avascular necrosis of bone (HCC)     right hip    CAD (coronary artery disease)     Coronary atherosclerosis     Dementia     Dementia (HCC)     Dizziness     cerebelar atrophy    Environmental allergies     Essential hypertension     H/O Doppler ultrasound 07/11/2016    carotid - normal    IBS (irritable bowel syndrome)     Mental disability     Recurrent falls     S/P PTCA (percutaneous transluminal coronary angioplasty) 06/16/2016    Cx OM stent    Seizure disorder (HCC)     Urge incontinence of urine     Wrist joint pain     chronic left     Past Surgical History:        Procedure Laterality Date    CARDIAC SURGERY      JOINT REPLACEMENT      hip replacement left    ORIF FEMUR DECOMPRESSION  06/2007    left     PTCA  06/16/2016    Cx OM stent     IMedications Prior to Admission:    No current facility-administered medications on file prior to encounter.       Current Outpatient Medications on File Prior to Encounter   Medication Sig Dispense Refill    ziprasidone (GEODON) 60 MG capsule Take 1 capsule by mouth 2 times daily (with meals) 60 capsule 3    metoprolol succinate (TOPROL XL) 100 MG extended release tablet Take 1 tablet by mouth daily 30 tablet 3    amLODIPine (NORVASC) 10 MG tablet Take 0.5 tablets by mouth daily 30 tablet 3    levothyroxine (SYNTHROID) 75 MCG tablet Take 1 tablet by mouth Daily 30 tablet 3    melatonin 3 MG TABS tablet Take 3 mg by mouth daily      VIMPAT 200 MG tablet TAKE ONE (1) TABLET BY MOUTH TWO TIMES DAILY 60 tablet 2    DULoxetine (CYMBALTA) 30 MG extended release capsule Take 1 capsule by mouth daily 30 capsule 0    lisinopril (PRINIVIL;ZESTRIL) 10 MG tablet Take 1 tablet by mouth daily 30 tablet 0    aspirin-dipyridamole (AGGRENOX)  MG per extended release capsule Take 1 capsule by mouth nightly       lamoTRIgine (LAMICTAL) 100 MG tablet Take 250 mg by mouth 2 times daily      atorvastatin (LIPITOR) 80 MG tablet Take 1 tablet by mouth daily 90 tablet 3    famotidine (PEPCID) 20 MG tablet Take 20 mg by mouth nightly      acetaminophen (TYLENOL) 500 MG tablet Take 1,000 mg by mouth every 4 hours as needed for Pain or Fever      metFORMIN (GLUCOPHAGE) 500 MG tablet Take 2 tablets by mouth 2 times daily (with meals) 120 tablet 3    loperamide (IMODIUM) 2 MG capsule Take 2 mg by mouth every 6 hours as needed for Diarrhea            Allergies:  Patient has no known allergies.     Social History:   Social History     Socioeconomic History    Marital status: Single     Spouse name: Not on file    Number of children: Not on file    Years of education: Not on file    Highest education level: Not on file   Occupational History    Not on file   Social Needs    Financial resource strain: Not on file    Food insecurity:     Worry: Not on file     Inability: Not on file    Transportation needs:     Medical: Not on file     Non-medical: Not on file   Tobacco Use    Smoking status: Never Smoker    Smokeless tobacco: Never Used   Substance and Sexual Activity    Alcohol use: No    Drug use: No    Sexual activity: Not Currently     Partners: Female   Lifestyle    Physical activity:     Days per week: Not on file     Minutes per session: Not on file    Stress: Not on file   Relationships    Social connections:     Talks on phone: Not on file     Gets together: Not on file     Attends Alevism service: Not on file     Active member of club or organization: Not on file     Attends meetings of clubs or organizations: Not

## 2020-02-03 NOTE — BH NOTE
Group Therapy Note    Date: 2/2/2020  Start Time: 7517  End Time:  2000  Number of Participants: 1      Type of Group: Nursing Education      Notes:  Reviewed medication with pt. He was unable to participate.     Status After Intervention:  Unchanged    Participation Level: None    Participation Quality: Lethargic      Speech:  mute      Thought Process/Content: LIVE    Affective Functioning: Blunted      Mood: Sleepy      Level of consciousness:  Drowsy and Inattentive      Response to Learning: None    Endings: None Reported    Modes of Intervention: Education      Discipline Responsible: Registered Nurse      Signature:  Patricia Olea RN

## 2020-02-03 NOTE — PLAN OF CARE
Content:Normal: No  Thought Content: Other(See Comment)(difficult to assess due to cognitive impairment)  Hallucinations: None  Delusions: No  Memory:Normal: No  Memory: Poor Recent, Poor Remote  Insight and Judgment: No  Insight and Judgment: Poor Judgment, Poor Insight  Present Suicidal Ideation: No  Present Homicidal Ideation: No    Daily Assessment Last Entry:   Daily Sleep (WDL): Within Defined Limits  Patient Currently in Pain: Denies  Daily Nutrition (WDL): Within Defined Limits  Appetite Change: Normal for patient  Barriers to Nutrition: Blood sugar control, Cognitive impairment  Level of Assistance: Set up    Patient Monitoring:  Frequency of Checks: 4 times per hour, close    Psychiatric Symptoms:   Depression Symptoms  Depression Symptoms: Impaired concentration  Anxiety Symptoms  Anxiety Symptoms: Generalized  Jessiac Symptoms  Jessica Symptoms: No problems reported or observed. Psychosis Symptoms  Delusion Type: No problems reported or observed.     Suicide Risk CSSR-S:  1) Within the past month, have you wished you were dead or wished you could go to sleep and not wake up? : No  2) Have you actually had any thoughts of killing yourself? : No  6) Have you ever done anything, started to do anything, or prepared to do anything to end your life?: No      EDUCATION:   Learner Progress Toward Treatment Goals: Reviewed goals and plan of care    Method: Group    Outcome: No evidence of Learning    PATIENT GOALS: Med titration, compliance with unit programming    PLAN/TREATMENT RECOMMENDATIONS UPDATE: Med titration    Estimated Length of Stay Update:  7 days  Estimated Discharge Date Update: 02/10/20  Discharge criteria: Med titration      SHORT-TERM GOALS UPDATE:   Time frame for Short-Term Goals: 7 days    LONG-TERM GOALS UPDATE:   Time frame for Long-Term Goals: 7 days  Members Present in Team Meeting: See Signature Sheet    Terri Churchill MSW, LSW

## 2020-02-03 NOTE — PROGRESS NOTES
Psychiatric Progress Note    Vanda Flores  [id number]  02/03/20    CHIEF COMPLAINT: unchanged    HPI: Vanda Flores  is a 60year old  male who presents for exacerbation of schizophrenia with suicidal ideation and severe depression. Pt noted recent exacerbation of psychosis and mood. Pt noted he currently feels safe and comfortable on the unit. Pt was in agreement with treatment team.  Pt was polite and cordial during the interview process. Pt agreed to start risperidone, to consider paliperidone long acting injectable. Pt was in agreement.     During today's interview, patient was alert and oriented to self and month. Pt noted he is doing [place] today. \"  Pt noted he is sleeping \"okay last night. \"  Pt noted his appetite is getting better. Pt endorsed depression but was unable to rate it on a numeric scale. Pt denied anxiety. Pt noted passive thoughts to harm himself at this time. Pt denied any thoughts to harm anyone else. Pt denied any auditory or visual hallucinations. He notes that he feels dizzy when standing.     No Known Allergies    Medications Prior to Admission: ziprasidone (GEODON) 60 MG capsule, Take 1 capsule by mouth 2 times daily (with meals)  metoprolol succinate (TOPROL XL) 100 MG extended release tablet, Take 1 tablet by mouth daily  amLODIPine (NORVASC) 10 MG tablet, Take 0.5 tablets by mouth daily  levothyroxine (SYNTHROID) 75 MCG tablet, Take 1 tablet by mouth Daily  melatonin 3 MG TABS tablet, Take 3 mg by mouth daily  VIMPAT 200 MG tablet, TAKE ONE (1) TABLET BY MOUTH TWO TIMES DAILY  DULoxetine (CYMBALTA) 30 MG extended release capsule, Take 1 capsule by mouth daily  lisinopril (PRINIVIL;ZESTRIL) 10 MG tablet, Take 1 tablet by mouth daily  aspirin-dipyridamole (AGGRENOX)  MG per extended release capsule, Take 1 capsule by mouth nightly   lamoTRIgine (LAMICTAL) 100 MG tablet, Take 250 mg by mouth 2 times daily  atorvastatin (LIPITOR) 80 MG tablet, Take 1 tablet by

## 2020-02-03 NOTE — GROUP NOTE
Group Therapy Note    Date: 2/3/2020    Group Start Time: 9079  Group End Time: 1600    Number of Participants: 4/7    Type: Exercise/Recreation Group    Group Topic/Objective: Chair Exercises    Notes:  Pt attended group, but noted to spend majority of time sleeping. Status After Intervention:  Unchanged    Participation Level: None    Participation Quality: Lethargic    Speech:  normal    Thought Process/Content: Pt sleeping. Affective Functioning: Blunted    Mood: Blunted    Level of consciousness:  Drowsy    Response to Learning: Pt unable to demonstrate response to learning at this time.      Endings: None Reported    Modes of Intervention: Activity and Movement    Discipline Responsible: Certified Therapeutic Recreation Specialist    Electronically signed by Darlyn Alpers, [place], MA on 2/4/2020 at 9:08 AM

## 2020-02-04 LAB
GLUCOSE BLD-MCNC: 109 MG/DL (ref 70–99)
GLUCOSE BLD-MCNC: 121 MG/DL (ref 70–99)
GLUCOSE BLD-MCNC: 122 MG/DL (ref 70–99)
GLUCOSE BLD-MCNC: 125 MG/DL (ref 70–99)
GLUCOSE BLD-MCNC: 134 MG/DL (ref 70–99)

## 2020-02-04 PROCEDURE — 82962 GLUCOSE BLOOD TEST: CPT

## 2020-02-04 PROCEDURE — 6370000000 HC RX 637 (ALT 250 FOR IP): Performed by: HOSPITALIST

## 2020-02-04 PROCEDURE — 6370000000 HC RX 637 (ALT 250 FOR IP): Performed by: PSYCHIATRY & NEUROLOGY

## 2020-02-04 PROCEDURE — 99233 SBSQ HOSP IP/OBS HIGH 50: CPT | Performed by: NURSE PRACTITIONER

## 2020-02-04 PROCEDURE — 1240000000 HC EMOTIONAL WELLNESS R&B

## 2020-02-04 RX ORDER — LACOSAMIDE 50 MG/1
200 TABLET ORAL 2 TIMES DAILY
Status: DISCONTINUED | OUTPATIENT
Start: 2020-02-04 | End: 2020-02-05 | Stop reason: ALTCHOICE

## 2020-02-04 RX ORDER — LACOSAMIDE 100 MG/1
200 TABLET ORAL 2 TIMES DAILY
Status: DISCONTINUED | OUTPATIENT
Start: 2020-02-04 | End: 2020-02-04 | Stop reason: RX

## 2020-02-04 RX ADMIN — LAMOTRIGINE 250 MG: 100 TABLET ORAL at 08:04

## 2020-02-04 RX ADMIN — TAMSULOSIN HYDROCHLORIDE 0.4 MG: 0.4 CAPSULE ORAL at 08:04

## 2020-02-04 RX ADMIN — ZIPRASIDONE HYDROCHLORIDE 80 MG: 40 CAPSULE ORAL at 17:14

## 2020-02-04 RX ADMIN — DULOXETINE HYDROCHLORIDE 30 MG: 30 CAPSULE, DELAYED RELEASE ORAL at 08:04

## 2020-02-04 RX ADMIN — METOPROLOL SUCCINATE 100 MG: 50 TABLET, EXTENDED RELEASE ORAL at 08:05

## 2020-02-04 RX ADMIN — AMLODIPINE BESYLATE 10 MG: 5 TABLET ORAL at 08:05

## 2020-02-04 RX ADMIN — LORAZEPAM 1 MG: 1 TABLET ORAL at 08:05

## 2020-02-04 RX ADMIN — LISINOPRIL 10 MG: 10 TABLET ORAL at 08:05

## 2020-02-04 RX ADMIN — MELATONIN TAB 3 MG 3 MG: 3 TAB at 20:54

## 2020-02-04 RX ADMIN — ATORVASTATIN CALCIUM 80 MG: 40 TABLET, FILM COATED ORAL at 08:04

## 2020-02-04 RX ADMIN — BENZTROPINE MESYLATE 1 MG: 1 TABLET ORAL at 08:05

## 2020-02-04 RX ADMIN — BENZTROPINE MESYLATE 1 MG: 1 TABLET ORAL at 20:55

## 2020-02-04 RX ADMIN — ASPIRIN AND DIPYRIDAMOLE 1 CAPSULE: 25; 200 CAPSULE, EXTENDED RELEASE ORAL at 22:27

## 2020-02-04 RX ADMIN — LACOSAMIDE 200 MG: 50 TABLET, FILM COATED ORAL at 20:55

## 2020-02-04 RX ADMIN — LEVOTHYROXINE SODIUM 75 MCG: 0.07 TABLET ORAL at 06:41

## 2020-02-04 RX ADMIN — ZIPRASIDONE HYDROCHLORIDE 80 MG: 40 CAPSULE ORAL at 08:04

## 2020-02-04 RX ADMIN — LACOSAMIDE 200 MG: 50 TABLET, FILM COATED ORAL at 08:05

## 2020-02-04 RX ADMIN — LAMOTRIGINE 250 MG: 100 TABLET ORAL at 20:54

## 2020-02-04 RX ADMIN — LORAZEPAM 1 MG: 1 TABLET ORAL at 20:55

## 2020-02-04 ASSESSMENT — PAIN SCALES - GENERAL: PAINLEVEL_OUTOF10: 0

## 2020-02-04 NOTE — PLAN OF CARE
Date: 7/8/2018    Time: 10:30 PM    Patient Placed On BIPAP/CPAP/ Non-Invasive Ventilation? Yes    If no must comment. Facial area red/color change? No           If YES are Blister/Lesion present? No   If yes must notify nursing staff  BIPAP/CPAP skin barrier?   Yes    Skin barrier type:Liquicel       Comments:        Arletta File improve  Description  Compliance with prescribed medication regimen will improve  2/4/2020 0058 by Michele Leach RN  Outcome: Ongoing  2/3/2020 1747 by Chidi Muñoz LPN  Outcome: Ongoing  Goal: Patient specific goal  Description  Patient specific goal  2/4/2020 0058 by Michele Leach RN  Outcome: Ongoing  2/3/2020 1747 by Chidi Muñoz LPN  Outcome: Ongoing     Problem: Risk for Impaired Skin Integrity  Goal: Tissue integrity - skin and mucous membranes  Description  Structural intactness and normal physiological function of skin and  mucous membranes.   2/4/2020 0058 by Michele Leach RN  Outcome: Ongoing  2/3/2020 1747 by Chidi Muñoz LPN  Outcome: Ongoing

## 2020-02-04 NOTE — GROUP NOTE
Group Therapy Note    Date: 2/4/2020    Group Start Time: 0830  Group End Time: 0900     Number of Participants: 5/7    Type: Morning Goals Group/ Community Meeting    Group Topic/Objective: Set Goal For The Day and to review Unit Rules and Regulations. Patient's Goal:  Pt states his goal is to [facility] my bed. \"    Notes:  Pt participated appropriately in group. Pt noted to never initiate conversation, but would participate if directly addressed. Pt shared he is feeling \"alright\" and is grateful for his glasses. Pt struggled to provide numerical values. Depression (0-10): 0    Anxiety (0-10): Pt unable to rate, but does endorse feelings of anxiety. Irritability/Aggitation (0-10): 0    Status After Intervention:  Improved    Participation Level:  Active Listener and Interactive    Participation Quality: Appropriate, Attentive and Sharing    Speech:  normal    Thought Process/Content: Logical    Affective Functioning: Blunted    Mood: Blunted    Level of consciousness:  Alert and Attentive    Response to Learning: Able to verbalize current knowledge/experience    Endings: None Reported    Modes of Intervention: Activity and Movement    Discipline Responsible: Certified Therapeutic Recreation Specialist    Electronically signed by LUCY Campo MA on 2/4/2020 at 9:44 AM

## 2020-02-04 NOTE — PROGRESS NOTES
Rec'd phone call from Doris Luna,  from [facility] MR/DD Board for update on pt condition. Wanted to let PT/OT know that pt lives in a one story house with one step into home. Has one bathroom that is small and modifications are planned. Pt has no family that is involved in care. Lives in [facility] and is interested in going to a local nursing home for therapy/rehab before coming home.  Peng Gallagher) [phone]

## 2020-02-04 NOTE — PROGRESS NOTES
Psychiatric Progress Note    Dola Cowden  [id number]  02/04/20    CHIEF COMPLAINT: unchanged    HPI: Dola Cowden  is a 60year old  male who presents for exacerbation of schizophrenia with suicidal ideation and severe depression. Pt noted recent exacerbation of psychosis and mood. Pt noted he currently feels safe and comfortable on the unit. Pt was in agreement with treatment team.  Pt was polite and cordial during the interview process. Pt agreed to start risperidone, to consider paliperidone long acting injectable. Pt was in agreement.     During today's interview, patient was alert and oriented to self and month. Pt noted he is doing [place] today. \"  Pt noted he is \" didn't sleep well last night. \"  Pt noted his appetite is \"alright. \"  Pt denied depression and anxiety. Pt denied SI/HI and AVH. His speech is much more clear today and thought process more lucid.     Awaiting recommendations from Hospitalist.    No Known Allergies    Medications Prior to Admission: ziprasidone (GEODON) 60 MG capsule, Take 1 capsule by mouth 2 times daily (with meals)  metoprolol succinate (TOPROL XL) 100 MG extended release tablet, Take 1 tablet by mouth daily  amLODIPine (NORVASC) 10 MG tablet, Take 0.5 tablets by mouth daily  levothyroxine (SYNTHROID) 75 MCG tablet, Take 1 tablet by mouth Daily  melatonin 3 MG TABS tablet, Take 3 mg by mouth daily  VIMPAT 200 MG tablet, TAKE ONE (1) TABLET BY MOUTH TWO TIMES DAILY  DULoxetine (CYMBALTA) 30 MG extended release capsule, Take 1 capsule by mouth daily  lisinopril (PRINIVIL;ZESTRIL) 10 MG tablet, Take 1 tablet by mouth daily  aspirin-dipyridamole (AGGRENOX)  MG per extended release capsule, Take 1 capsule by mouth nightly   lamoTRIgine (LAMICTAL) 100 MG tablet, Take 250 mg by mouth 2 times daily  atorvastatin (LIPITOR) 80 MG tablet, Take 1 tablet by mouth daily  famotidine (PEPCID) 20 MG tablet, Take 20 mg by mouth nightly  acetaminophen (TYLENOL) 500 MG

## 2020-02-05 LAB
ALBUMIN SERPL-MCNC: 4.1 GM/DL (ref 3.4–5)
ALP BLD-CCNC: 45 IU/L (ref 40–129)
ALT SERPL-CCNC: 38 U/L (ref 10–40)
AMYLASE: 109 U/L (ref 25–115)
ANION GAP SERPL CALCULATED.3IONS-SCNC: 15 MMOL/L (ref 4–16)
AST SERPL-CCNC: 22 IU/L (ref 15–37)
BILIRUB SERPL-MCNC: 0.6 MG/DL (ref 0–1)
BUN BLDV-MCNC: 15 MG/DL (ref 6–23)
CALCIUM SERPL-MCNC: 9.2 MG/DL (ref 8.3–10.6)
CHLORIDE BLD-SCNC: 99 MMOL/L (ref 99–110)
CO2: 22 MMOL/L (ref 21–32)
CREAT SERPL-MCNC: 1.2 MG/DL (ref 0.9–1.3)
GFR AFRICAN AMERICAN: >60 ML/MIN/1.73M2
GFR NON-AFRICAN AMERICAN: >60 ML/MIN/1.73M2
GLUCOSE BLD-MCNC: 128 MG/DL (ref 70–99)
GLUCOSE BLD-MCNC: 131 MG/DL (ref 70–99)
GLUCOSE BLD-MCNC: 135 MG/DL (ref 70–99)
GLUCOSE BLD-MCNC: 155 MG/DL (ref 70–99)
GLUCOSE BLD-MCNC: 161 MG/DL (ref 70–99)
LIPASE: 61 IU/L (ref 13–60)
POTASSIUM SERPL-SCNC: 4.4 MMOL/L (ref 3.5–5.1)
SODIUM BLD-SCNC: 136 MMOL/L (ref 135–145)
TOTAL PROTEIN: 6.6 GM/DL (ref 6.4–8.2)

## 2020-02-05 PROCEDURE — 36415 COLL VENOUS BLD VENIPUNCTURE: CPT

## 2020-02-05 PROCEDURE — 6370000000 HC RX 637 (ALT 250 FOR IP): Performed by: PSYCHIATRY & NEUROLOGY

## 2020-02-05 PROCEDURE — 83690 ASSAY OF LIPASE: CPT

## 2020-02-05 PROCEDURE — 97535 SELF CARE MNGMENT TRAINING: CPT

## 2020-02-05 PROCEDURE — 80053 COMPREHEN METABOLIC PANEL: CPT

## 2020-02-05 PROCEDURE — 1240000000 HC EMOTIONAL WELLNESS R&B

## 2020-02-05 PROCEDURE — 97167 OT EVAL HIGH COMPLEX 60 MIN: CPT

## 2020-02-05 PROCEDURE — 82150 ASSAY OF AMYLASE: CPT

## 2020-02-05 PROCEDURE — 6370000000 HC RX 637 (ALT 250 FOR IP): Performed by: HOSPITALIST

## 2020-02-05 PROCEDURE — 82962 GLUCOSE BLOOD TEST: CPT

## 2020-02-05 PROCEDURE — 97530 THERAPEUTIC ACTIVITIES: CPT

## 2020-02-05 PROCEDURE — 99233 SBSQ HOSP IP/OBS HIGH 50: CPT | Performed by: NURSE PRACTITIONER

## 2020-02-05 PROCEDURE — 97163 PT EVAL HIGH COMPLEX 45 MIN: CPT

## 2020-02-05 RX ORDER — LACOSAMIDE 50 MG/1
200 TABLET ORAL 2 TIMES DAILY
Status: DISCONTINUED | OUTPATIENT
Start: 2020-02-05 | End: 2020-02-05 | Stop reason: RX

## 2020-02-05 RX ORDER — LACOSAMIDE 100 MG/1
200 TABLET ORAL 2 TIMES DAILY
Status: DISCONTINUED | OUTPATIENT
Start: 2020-02-05 | End: 2020-02-25 | Stop reason: HOSPADM

## 2020-02-05 RX ADMIN — BENZTROPINE MESYLATE 1 MG: 1 TABLET ORAL at 08:01

## 2020-02-05 RX ADMIN — METOPROLOL SUCCINATE 100 MG: 50 TABLET, EXTENDED RELEASE ORAL at 08:01

## 2020-02-05 RX ADMIN — ASPIRIN AND DIPYRIDAMOLE 1 CAPSULE: 25; 200 CAPSULE, EXTENDED RELEASE ORAL at 21:08

## 2020-02-05 RX ADMIN — MELATONIN TAB 3 MG 3 MG: 3 TAB at 20:30

## 2020-02-05 RX ADMIN — LAMOTRIGINE 250 MG: 100 TABLET ORAL at 08:00

## 2020-02-05 RX ADMIN — LAMOTRIGINE 250 MG: 100 TABLET ORAL at 20:30

## 2020-02-05 RX ADMIN — LISINOPRIL 10 MG: 10 TABLET ORAL at 08:01

## 2020-02-05 RX ADMIN — LACOSAMIDE 200 MG: 50 TABLET, FILM COATED ORAL at 08:00

## 2020-02-05 RX ADMIN — TAMSULOSIN HYDROCHLORIDE 0.4 MG: 0.4 CAPSULE ORAL at 08:00

## 2020-02-05 RX ADMIN — ACETAMINOPHEN 500 MG: 500 TABLET ORAL at 05:23

## 2020-02-05 RX ADMIN — ZIPRASIDONE HYDROCHLORIDE 80 MG: 40 CAPSULE ORAL at 17:59

## 2020-02-05 RX ADMIN — LORAZEPAM 1 MG: 1 TABLET ORAL at 05:23

## 2020-02-05 RX ADMIN — ZIPRASIDONE HYDROCHLORIDE 80 MG: 40 CAPSULE ORAL at 08:00

## 2020-02-05 RX ADMIN — LORAZEPAM 1 MG: 1 TABLET ORAL at 20:30

## 2020-02-05 RX ADMIN — LACOSAMIDE 200 MG: 100 TABLET, FILM COATED ORAL at 20:30

## 2020-02-05 RX ADMIN — DULOXETINE HYDROCHLORIDE 30 MG: 30 CAPSULE, DELAYED RELEASE ORAL at 08:01

## 2020-02-05 RX ADMIN — LEVOTHYROXINE SODIUM 75 MCG: 0.07 TABLET ORAL at 06:38

## 2020-02-05 RX ADMIN — BENZTROPINE MESYLATE 1 MG: 1 TABLET ORAL at 20:30

## 2020-02-05 RX ADMIN — ATORVASTATIN CALCIUM 80 MG: 40 TABLET, FILM COATED ORAL at 07:59

## 2020-02-05 RX ADMIN — LORAZEPAM 1 MG: 1 TABLET ORAL at 08:01

## 2020-02-05 RX ADMIN — AMLODIPINE BESYLATE 10 MG: 5 TABLET ORAL at 08:01

## 2020-02-05 ASSESSMENT — PAIN DESCRIPTION - ONSET: ONSET: GRADUAL

## 2020-02-05 ASSESSMENT — PAIN DESCRIPTION - ORIENTATION
ORIENTATION: UPPER
ORIENTATION: MID
ORIENTATION: UPPER

## 2020-02-05 ASSESSMENT — PAIN DESCRIPTION - DESCRIPTORS
DESCRIPTORS: ACHING
DESCRIPTORS: ACHING

## 2020-02-05 ASSESSMENT — PAIN SCALES - GENERAL
PAINLEVEL_OUTOF10: 2
PAINLEVEL_OUTOF10: 5

## 2020-02-05 ASSESSMENT — PAIN DESCRIPTION - PAIN TYPE: TYPE: ACUTE PAIN

## 2020-02-05 ASSESSMENT — PAIN DESCRIPTION - FREQUENCY
FREQUENCY: INTERMITTENT
FREQUENCY: INTERMITTENT

## 2020-02-05 ASSESSMENT — PAIN DESCRIPTION - LOCATION
LOCATION: BACK
LOCATION: BACK
LOCATION: HEAD

## 2020-02-05 ASSESSMENT — PAIN DESCRIPTION - PROGRESSION: CLINICAL_PROGRESSION: GRADUALLY WORSENING

## 2020-02-05 NOTE — PROGRESS NOTES
Psychiatric Progress Note    Romana Najera  [id number]  02/05/20    CHIEF COMPLAINT: unchanged    HPI: Romana Najera  is a 60year old  male who presents for exacerbation of schizophrenia with suicidal ideation and severe depression. Pt noted recent exacerbation of psychosis and mood. Pt noted he currently feels safe and comfortable on the unit. Pt was in agreement with treatment team.  Pt was polite and cordial during the interview process. Pt agreed to start risperidone, to consider paliperidone long acting injectable. Pt was in agreement.     During today's interview, patient was drowsy and oriented to self only. Pt noted he is doing [place] today. \"  Pt noted \"slept OK last night. \"  Pt noted his appetite is \"OK. \"  Pt endorsed depression and anxiety but is unable to rate them on a numeric scale. Pt denied SI/HI and AVH. His speech is less clear today and thought process less lucid. Physical Therapy and Occupational Therapy have evaluated the patient and recommend continued therapy several times each week to overcome current deficits.     No Known Allergies    Medications Prior to Admission: ziprasidone (GEODON) 60 MG capsule, Take 1 capsule by mouth 2 times daily (with meals)  metoprolol succinate (TOPROL XL) 100 MG extended release tablet, Take 1 tablet by mouth daily  amLODIPine (NORVASC) 10 MG tablet, Take 0.5 tablets by mouth daily  levothyroxine (SYNTHROID) 75 MCG tablet, Take 1 tablet by mouth Daily  melatonin 3 MG TABS tablet, Take 3 mg by mouth daily  VIMPAT 200 MG tablet, TAKE ONE (1) TABLET BY MOUTH TWO TIMES DAILY  DULoxetine (CYMBALTA) 30 MG extended release capsule, Take 1 capsule by mouth daily  lisinopril (PRINIVIL;ZESTRIL) 10 MG tablet, Take 1 tablet by mouth daily  aspirin-dipyridamole (AGGRENOX)  MG per extended release capsule, Take 1 capsule by mouth nightly   lamoTRIgine (LAMICTAL) 100 MG tablet, Take 250 mg by mouth 2 times daily  atorvastatin (LIPITOR) 80 MG tablet, of Systems    OBJECTIVE  Vital Signs:  Vitals:    02/05/20 0720   BP: 110/67   Pulse: 80   Resp: 16   Temp: 97.5 °F (36.4 °C)   SpO2: 95%       Labs:  Recent Results (from the past 48 hour(s))   Basic metabolic panel    Collection Time: 02/03/20 12:30 PM   Result Value Ref Range    Sodium 135 135 - 145 MMOL/L    Potassium 4.6 3.5 - 5.1 MMOL/L    Chloride 99 99 - 110 mMol/L    CO2 29 21 - 32 MMOL/L    Anion Gap 7 4 - 16    BUN 19 6 - 23 MG/DL    CREATININE 1.3 0.9 - 1.3 MG/DL    Glucose 129 (H) 70 - 99 MG/DL    Calcium 9.6 8.3 - 10.6 MG/DL    GFR Non- 56 (L) >60 mL/min/1.73m2    GFR African American >60 >60 mL/min/1.73m2   CBC    Collection Time: 02/03/20 12:30 PM   Result Value Ref Range    WBC 6.6 4.0 - 10.5 K/CU MM    RBC 4.97 4.6 - 6.2 M/CU MM    Hemoglobin 15.6 13.5 - 18.0 GM/DL    Hematocrit 47.1 42 - 52 %    MCV 94.8 78 - 100 FL    MCH 31.4 (H) 27 - 31 PG    MCHC 33.1 32.0 - 36.0 %    RDW 12.4 11.7 - 14.9 %    Platelets 691 504 - 677 K/CU MM    MPV 9.8 7.5 - 11.1 FL   POCT Glucose    Collection Time: 02/03/20  4:41 PM   Result Value Ref Range    POC Glucose 200 (H) 70 - 99 MG/DL   POCT Glucose    Collection Time: 02/03/20  8:06 PM   Result Value Ref Range    POC Glucose 151 (H) 70 - 99 MG/DL   POCT Glucose    Collection Time: 02/04/20  7:51 AM   Result Value Ref Range    POC Glucose 121 (H) 70 - 99 MG/DL   POCT Glucose    Collection Time: 02/04/20 11:33 AM   Result Value Ref Range    POC Glucose 125 (H) 70 - 99 MG/DL   POCT Glucose    Collection Time: 02/04/20 11:45 AM   Result Value Ref Range    POC Glucose 122 (H) 70 - 99 MG/DL   POCT Glucose    Collection Time: 02/04/20  4:41 PM   Result Value Ref Range    POC Glucose 109 (H) 70 - 99 MG/DL   POCT Glucose    Collection Time: 02/04/20  8:08 PM   Result Value Ref Range    POC Glucose 134 (H) 70 - 99 MG/DL   Comprehensive Metabolic Panel w/ Reflex to MG    Collection Time: 02/05/20  6:00 AM   Result Value Ref Range    Sodium 136 135 - 145 MMOL/L

## 2020-02-05 NOTE — PROGRESS NOTES
intermittently opened with verbal cues and taps on UE. Balance  Standing Balance: Unable to assess(comment)(Pt was unable to demonstrate standing up from [facility] after multiple verbal and physical prompts. He started to lean forward 2x appearing to make attempt to stand then sat back and did not continue)  ADL  Feeding: Bringing food to mouth assist;Maximum assistance;Scoop assist;Increased time to complete  Additional Comments: Unable to formally assess at this time as Pt was not alert with min ability to follow directions   Coordination  Movements Are Fluid And Coordinated: No  Coordination and Movement description: Fine motor impairments;Decreased speed;Gross motor impairments;Decreased accuracy; Right UE;Left UE; Ataxia; Apraxia  Quality of Movement Other  Comment: Pt demo dificulty picking up a fork or spoon and required max A for eating during lunch. When asking Pt to touch his head he touched his teeth with LUE . He had trouble donning his glasses after they fell off and were handed to him.  He had difficulty touching his nose or eyses when prompted and would miss target     Bed mobility  Comment: Not assess Pt sitting up in [facility]  Transfers  Stand Step Transfers: Unable to assess  Sit to stand: Unable to assess  Stand to sit: Unable to assess  Transfer Comments: Pt unable to perform trf at this time d/t decreased alertness  Vision - Basic Assessment  Prior Vision: Wears glasses all the time  Cognition  Overall Cognitive Status: Exceptions  Arousal/Alertness: Inconsistent responses to stimuli  Following Commands: Does not follow commands  Attention Span: Unable to maintain attention  Safety Judgement: Decreased awareness of need for safety  Problem Solving: Assistance required to implement solutions;Assistance required to identify errors made;Assistance required to correct errors made;Decreased awareness of errors  Insights: Not aware of deficits  Initiation: Requires cues for all  Sequencing: Requires cues for all  Cognition Comment: Pt appeared lethartic and unable to attend and keep eyes open during evaluation. Perception  Overall Perceptual Status: Impaired  Unilateral Attention: Appears intact  Initiation: Cues to initiate tasks  Motor Planning: Cues to use objects appropriately     Sensation  Overall Sensation Status: (unable to assess)        LUE AROM (degrees)  LUE AROM : Exceptions  LUE General AROM: unable to formally assess as Pt unable to follow directions however Pt demo reaching to upper back to scratch back   RUE AROM (degrees)  RUE AROM : Exceptions  RUE General AROM: Unable to formally assess as Pt could not follow directions and had difficulty reaching out to touch therapist's hand when cued  LUE Strength  Gross LUE Strength: [facility]  LUE Strength Comment: unable to formally assess  RUE Strength  Gross RUE Strength: Exceptions to [facility]  RUE Strength Comment: unable to formally assess                   Plan   Plan  Times per week: 1+  Current Treatment Recommendations: Strengthening, Balance Training, Functional Mobility Training, Endurance Training, Safety Education & Training, Self-Care / ADL, Equipment Evaluation, Education, & procurement, Patient/Caregiver Education & Training, Neuromuscular Re-education, Cognitive Reorientation      AM-PAC Score  AM-PAC 6 click short form for inpatient daily activity:   How much help from another person does the patient currently need. .. Unable  Dep A Lot  Max A A Lot   Mod A A Little  Min A A Little   CGA  SBA None   Mod I  Indep  Sup   1. Putting on and taking off regular lower body clothing? [x] 1    [] 2   [] 2   [] 3   [] 3   [] 4      2. Bathing (including washing, rinsing, drying)? [x] 1   [] 2   [] 2 [] 3 [] 3 [] 4   3. Toileting, which includes using toilet, bedpan, or urinal? [x] 1    [] 2   [] 2   [] 3   [] 3   [] 4     4. Putting on and taking off regular upper body clothing? [x] 1   [] 2   [] 2   [] 3   [] 3    [] 4      5.  Taking care of

## 2020-02-06 LAB
GLUCOSE BLD-MCNC: 111 MG/DL (ref 70–99)
GLUCOSE BLD-MCNC: 115 MG/DL (ref 70–99)
GLUCOSE BLD-MCNC: 165 MG/DL (ref 70–99)
GLUCOSE BLD-MCNC: 94 MG/DL (ref 70–99)

## 2020-02-06 PROCEDURE — 97110 THERAPEUTIC EXERCISES: CPT

## 2020-02-06 PROCEDURE — 97530 THERAPEUTIC ACTIVITIES: CPT

## 2020-02-06 PROCEDURE — 99232 SBSQ HOSP IP/OBS MODERATE 35: CPT | Performed by: PSYCHIATRY & NEUROLOGY

## 2020-02-06 PROCEDURE — 1240000000 HC EMOTIONAL WELLNESS R&B

## 2020-02-06 PROCEDURE — 97535 SELF CARE MNGMENT TRAINING: CPT

## 2020-02-06 PROCEDURE — 6370000000 HC RX 637 (ALT 250 FOR IP): Performed by: PSYCHIATRY & NEUROLOGY

## 2020-02-06 PROCEDURE — 82962 GLUCOSE BLOOD TEST: CPT

## 2020-02-06 PROCEDURE — 6370000000 HC RX 637 (ALT 250 FOR IP): Performed by: HOSPITALIST

## 2020-02-06 RX ORDER — DIVALPROEX SODIUM 500 MG/1
1000 TABLET, EXTENDED RELEASE ORAL DAILY
Status: DISCONTINUED | OUTPATIENT
Start: 2020-02-06 | End: 2020-02-10

## 2020-02-06 RX ADMIN — LACOSAMIDE 200 MG: 100 TABLET, FILM COATED ORAL at 07:39

## 2020-02-06 RX ADMIN — TAMSULOSIN HYDROCHLORIDE 0.4 MG: 0.4 CAPSULE ORAL at 07:40

## 2020-02-06 RX ADMIN — LORAZEPAM 1 MG: 1 TABLET ORAL at 07:39

## 2020-02-06 RX ADMIN — AMLODIPINE BESYLATE 10 MG: 5 TABLET ORAL at 07:40

## 2020-02-06 RX ADMIN — LACOSAMIDE 200 MG: 100 TABLET, FILM COATED ORAL at 20:36

## 2020-02-06 RX ADMIN — BENZTROPINE MESYLATE 1 MG: 1 TABLET ORAL at 20:37

## 2020-02-06 RX ADMIN — ASPIRIN AND DIPYRIDAMOLE 1 CAPSULE: 25; 200 CAPSULE, EXTENDED RELEASE ORAL at 20:37

## 2020-02-06 RX ADMIN — DIVALPROEX SODIUM 1000 MG: 500 TABLET, EXTENDED RELEASE ORAL at 16:58

## 2020-02-06 RX ADMIN — DULOXETINE HYDROCHLORIDE 30 MG: 30 CAPSULE, DELAYED RELEASE ORAL at 07:42

## 2020-02-06 RX ADMIN — LAMOTRIGINE 250 MG: 100 TABLET ORAL at 07:39

## 2020-02-06 RX ADMIN — MELATONIN TAB 3 MG 3 MG: 3 TAB at 20:37

## 2020-02-06 RX ADMIN — LAMOTRIGINE 150 MG: 100 TABLET ORAL at 20:37

## 2020-02-06 RX ADMIN — LISINOPRIL 10 MG: 10 TABLET ORAL at 07:40

## 2020-02-06 RX ADMIN — ZIPRASIDONE HYDROCHLORIDE 80 MG: 40 CAPSULE ORAL at 07:38

## 2020-02-06 RX ADMIN — ATORVASTATIN CALCIUM 80 MG: 40 TABLET, FILM COATED ORAL at 07:39

## 2020-02-06 RX ADMIN — BENZTROPINE MESYLATE 1 MG: 1 TABLET ORAL at 07:40

## 2020-02-06 RX ADMIN — METOPROLOL SUCCINATE 100 MG: 50 TABLET, EXTENDED RELEASE ORAL at 07:40

## 2020-02-06 RX ADMIN — LORAZEPAM 1 MG: 1 TABLET ORAL at 20:36

## 2020-02-06 RX ADMIN — LEVOTHYROXINE SODIUM 75 MCG: 0.07 TABLET ORAL at 06:25

## 2020-02-06 RX ADMIN — ZIPRASIDONE HYDROCHLORIDE 80 MG: 40 CAPSULE ORAL at 16:59

## 2020-02-06 ASSESSMENT — PAIN SCALES - WONG BAKER: WONGBAKER_NUMERICALRESPONSE: 0

## 2020-02-06 ASSESSMENT — PAIN SCALES - PAIN ASSESSMENT IN ADVANCED DEMENTIA (PAINAD)
FACIALEXPRESSION: 0
NEGVOCALIZATION: 0
BREATHING: 0
BODYLANGUAGE: 0
CONSOLABILITY: 0
TOTALSCORE: 0

## 2020-02-06 ASSESSMENT — PAIN SCALES - GENERAL
PAINLEVEL_OUTOF10: 0
PAINLEVEL_OUTOF10: 0

## 2020-02-06 NOTE — PLAN OF CARE
Problem: Falls - Risk of:  Goal: Will remain free from falls  Description  Will remain free from falls  2/5/2020 2243 by Marcella Hernandez LPN  Outcome: Ongoing     Problem: Falls - Risk of:  Goal: Absence of physical injury  Description  Absence of physical injury  2/5/2020 2243 by Marcella Hernandez LPN  Outcome: Ongoing     Problem: Altered Mood, Psychotic Behavior:  Goal: Able to demonstrate trust by eating, participating in treatment and following staff's direction  Description  Able to demonstrate trust by eating, participating in treatment and following staff's direction  2/5/2020 2243 by Marcella Hernandez LPN  Outcome: Ongoing     Problem: Altered Mood, Psychotic Behavior:  Goal: Able to verbalize decrease in frequency and intensity of hallucinations  Description  Able to verbalize decrease in frequency and intensity of hallucinations  2/5/2020 2243 by Marcella Hernandez LPN  Outcome: Ongoing     Problem: Altered Mood, Psychotic Behavior:  Goal: Able to verbalize reality based thinking  Description  Able to verbalize reality based thinking  2/5/2020 2243 by Marcella Hernandez LPN  Outcome: Ongoing     Problem: Altered Mood, Psychotic Behavior:  Goal: Ability to achieve adequate nutritional intake will improve  Description  Ability to achieve adequate nutritional intake will improve  2/5/2020 2243 by Marcella Hernandez LPN  Outcome: Ongoing     Problem: Altered Mood, Psychotic Behavior:  Goal: Ability to interact with others will improve  Description  Ability to interact with others will improve  2/5/2020 2243 by Marcella Hernandez LPN  Outcome: Ongoing     Problem: Altered Mood, Psychotic Behavior:  Goal: Compliance with prescribed medication regimen will improve  Description  Compliance with prescribed medication regimen will improve  2/5/2020 2243 by Marcella Hernandez LPN  Outcome: Ongoing     Problem: Altered Mood, Psychotic Behavior:  Goal: Patient specific goal  Description  Patient specific goal  2/5/2020 2243 by Marcella Hernandez LPN  Outcome: Ongoing     Problem: Risk for Impaired Skin Integrity  Goal: Tissue integrity - skin and mucous membranes  Description  Structural intactness and normal physiological function of skin and  mucous membranes.   2/5/2020 2243 by Guzman Jackson LPN  Outcome: Ongoing     Problem: Pain:  Goal: Pain level will decrease  Description  Pain level will decrease  2/5/2020 2243 by Guzman Jackson LPN  Outcome: Ongoing     Problem: Pain:  Goal: Control of acute pain  Description  Control of acute pain  2/5/2020 2243 by Guzman Jackson LPN  Outcome: Ongoing     Problem: Pain:  Goal: Control of chronic pain  Description  Control of chronic pain  2/5/2020 2243 by Guzman Jackson LPN  Outcome: Ongoing

## 2020-02-06 NOTE — PROGRESS NOTES
Physical Rehabilitation: OCCUPATIONAL THERAPY     [x] daily progress note       [] discharge       Patient Name:  Julito Bautista   :  1955 MRN: [id number]  Room:  [address] [place] [address] Date of Admission: 2020  Rehabilitation Diagnosis:   Schizophrenia ([address] [F20.9]       Date 2020       Day of ARU Week:  7   Time IN/OUT 1107/1150   Individual Tx Minutes    Group Tx Minutes    Co-Treat Minutes 43   Concurrent Tx Minutes    TOTAL Tx Time Mins 43   Variance Time    Variance Time []   Refusal due to:     []   Medical hold/reason:    []   Illness   []   Off Unit for test/procedure  []   Extra time needed to complete task  []   Therapeutic need  []   Other (specify):   Restrictions Restrictions/Precautions  Restrictions/Precautions: General Precautions, Fall Risk  Required Braces or Orthoses?: No      Communication with other providers: [x]   OK to see per nursing:     [x]   Spoke with team member regarding: PT co-treat     Subjective observations and cognitive status: Pt presents awake sititng in [address] in common area fully dresssed. Pt presents more alert today and able to communicate and keep eyes open. Pain level/location:    /10       Location: Pt indicated pain across groin area and reported in finger in L hand but unable to quantify   Discharge recommendations  Anticipated discharge date:  TBD  Destination: []home alone   []home alone w assist prn [] home w/ family    [] Continuous supervision       [x]SNF            [] Assisted living     [] Other:   Continued therapy: []HHC OT  []OUTPATIENT  OT   [] No Further OT  Equipment needs: TBD   (HIT F2 to transition between stars)    Eating/Feeding:    Min A to correct as Pt was holding knife trying to scoop food.   Directed Pt to  fork after placement in hand then he was able to scoop food and bring to  mouth  Extremity Used:        [x]    R UE []    L UE         Dentures: []   N/A    []    Partial []    Full  Adaptive Equipment Used:      Dressing:  LE: Pt demo doffing sock from RLE however leaning over forward in [address] with CGA for safety, required max A to yeison socks and remove from LLE         Bed Mobility:           [x]   Pt received out of bed       Transfers:    Sit--> Stand:  From  3x with Max Ax2 , 2x with Mod A x2  Stand --> Sit:   Mod-Max A  Stand-Pivot:     Other:    Assistive device required for transfer:   RW      Functional Mobility:  Pt unable to ambulated, attempted 5x to stand up from [address] and Pt unable to fully stand upright requiring mod-max A x2  Assistance:    Device:   [x]   Rolling Walker     []   Standard Walker []   Wheelchair        []   [employer]       []   4-Wheeled Janette Minks         []   Cardiac Janette Minks       []   Other:        Homemaking Tasks: Additional Therapeutic activities/exercises completed this date:     [x]   ADL Training   [x]   Balance/Postural training     []   Bed/Transfer Training   []   Endurance Training   []   Neuromuscular Re-ed   []   Nu-step:  Time:        Level:         #Steps:       []   Rebounder:    []  Seated     []  Standing        []   Supine Ther Ex (reps/sets):     [x]   Seated Ther Ex (reps/sets):  Pt completed elbow flex/ext 10x after demo and able to reach hands over head for shoulder flex 3x. Pt had difficulty reaching forward/across midline to tough therapist hands   []   Standing Ther Ex (reps/sets):     []   Other:      Comments:      Patient/Caregiver Education and Training:   []   Adaptive Equipment Use  [x]   Bed Mobility/Transfer Technique/Safety  []   Energy Conservation Tips  []   Family training  [x]   Postural Awareness  [x]   Safety During Functional Activities  []   Reinforced Patient's Precautions   []   Progress was updated and reviewed in Rehabtracker with patient and/or family this         date.      Treatment Plan for Next Session:   Continue current POC    Assessment:  Pt tolerated with improvement today and demonstrated improved hand coordination as he was able to remove and replace his glasses and able to try to doff his socks. He would get distracted and perseverate on the gait belt on him so this had to be removed so he could follow directions for seated exercises. He attempted sit to stand from [facility] 5x with mod-max A x2 and was unable to flex hips forward and ext knees to  upright position and would  forward flexed position with knees bent. Treatment/Activity Tolerance:   [x] Tolerated treatment with no adverse effects    [] Patient limited by fatigue  [] Patient limited by pain   [] Patient limited by medical complications:    [] Adverse reaction to Tx:   [] Significant change in status    Safety:       []  bed alarm set    [x]  chair alarm set    []  Pt refused alarms                []  Telesitter activated      [x]  Gait belt used during tx session      []other:       Number of Minutes/Billable Intervention  Therapeutic Exercise 15   ADL Self-care    Neuro Re-Ed    Therapeutic Activity 28   Group    Other:    TOTAL 43       Social History  Social/Functional History  Lives With: Other (comment)(Group home)  Type of Home: Facility(group home)  Home Layout: One level  Home Access: Level entry  ADL Assistance: Independent  Homemaking Assistance: Needs assistance  Homemaking Responsibilities: (Meals are brought to Pt at home)  Ambulation Assistance: Independent  Transfer Assistance: Independent  Active : No  Type of occupation: works at [employer]  Additional Comments: Pt unable to provide history. Information obtained per report in chart. Per nurse Pt was living with a roommate at a group home and had meals brought in but was indep with ADLs and he went to work at [employer] every day.   Reports Pt had a seizure and since then he hasn't been able to walk or move his legs    Objective                                                                                    Goals:  (Update in navigator)  Short term goals  Time Frame for Short term goals: until DC or goals met  Short term goal 1: Pt will complete transfers mod . I (bed, chair, toilet)  Short term goal 2: Pt will complete UE/LE bathing mod I  Short term goal 3: Pt will complete UE/LE dressing mod I  Short term goal 4: Pt will complete toileting mod I  Short term goal 5: Pt will demo 3+ min of standing/functional mobility for ALDs mod I:   :        Plan of Care                                                                              Times per week: 5 days per week for a minimum of 60 minutes/day plus group as appropriate for 60 minutes. Treatment to include Plan  Times per week: 1+  Current Treatment Recommendations: Strengthening, Balance Training, Functional Mobility Training, Endurance Training, Safety Education & Training, Self-Care / ADL, Equipment Evaluation, Education, & procurement, Patient/Caregiver Education & Training, Neuromuscular Re-education, Cognitive Reorientation    Electronically signed by   [employer].  FERNIE Barrera [id number]  2/6/2020, 1:50 PM

## 2020-02-06 NOTE — BH NOTE
Group Therapy Note    Date: 2/5/2020  Start Time: 2000  End Time:  2030  Number of Participants: 1    Type of Group: wrap up    Patient's Goal:  No goal today. Notes:  Offered patient 1:1 wrap up therapy, pt too drowsy to answer questions appropriately. Status After Intervention:  Unchanged    Participation Level: None    Participation Quality: Lethargic      Speech:  normal      Thought Process/Content: Linear      Affective Functioning: Constricted/Restricted      Mood: calm      Level of consciousness:  Inattentive      Response to Learning: Resistant      Endings: None Reported    Modes of Intervention: Support      Discipline Responsible: Licensed Practical Nurse      Signature:   Pastor Connor LPN

## 2020-02-06 NOTE — PLAN OF CARE
Problem: Falls - Risk of:  Goal: Will remain free from falls  Description  Will remain free from falls  2/6/2020 1427 by Joselyn Randhawa LPN  Outcome: Ongoing  2/6/2020 0732 by Faiza Butler RN  Outcome: Ongoing  Goal: Absence of physical injury  Description  Absence of physical injury  2/6/2020 1427 by Joselyn Randhawa LPN  Outcome: Ongoing  2/6/2020 0732 by Faiza Butler RN  Outcome: Ongoing     Problem: Altered Mood, Psychotic Behavior:  Goal: Able to demonstrate trust by eating, participating in treatment and following staff's direction  Description  Able to demonstrate trust by eating, participating in treatment and following staff's direction  2/6/2020 1427 by Joselyn Randhawa LPN  Outcome: Ongoing  2/6/2020 0732 by Faiza Butler RN  Outcome: Ongoing  Goal: Able to verbalize decrease in frequency and intensity of hallucinations  Description  Able to verbalize decrease in frequency and intensity of hallucinations  2/6/2020 1427 by Joselyn Randhawa LPN  Outcome: Ongoing  2/6/2020 0732 by Faiza Butler RN  Outcome: Ongoing  Goal: Able to verbalize reality based thinking  Description  Able to verbalize reality based thinking  2/6/2020 1427 by Joselyn Randhawa LPN  Outcome: Ongoing  2/6/2020 0732 by Faiza Butler RN  Outcome: Ongoing  Goal: Absence of self-harm  Description  Absence of self-harm  2/6/2020 1427 by Joselyn Randhawa LPN  Outcome: Ongoing  2/6/2020 0732 by Faiza Butler RN  Outcome: Ongoing  Goal: Ability to achieve adequate nutritional intake will improve  Description  Ability to achieve adequate nutritional intake will improve  2/6/2020 1427 by Joselyn Randhawa LPN  Outcome: Ongoing  2/6/2020 0732 by Faiza Butler RN  Outcome: Ongoing  Goal: Ability to interact with others will improve  Description  Ability to interact with others will improve  2/6/2020 1427 by Joselyn Randhawa LPN  Outcome: Ongoing  2/6/2020 0732 by Faiza Butler RN  Outcome: Ongoing  Goal: Compliance with prescribed medication

## 2020-02-06 NOTE — GROUP NOTE
Group Therapy Note    Date: 2/6/2020    Group Start Time: 0830  Group End Time: 0900    Number of Participants: 3/4    Type: Morning Goals Group/ Community Meeting    Group Topic/Objective: Set Goal For The Day and to review Unit Rules and Regulations. Patient's Goal:  Pt states his goal is \"Get up and use the restroom alright. \"    Notes:  Pt presented as pleasant and cooperative during the group. Pt noted to laugh and joke with therapist. Pt expressed feeling \"alright\" and is grateful \"for coming here. \"  Pt expressed having restful sleep, but unable to provide a number. Depression (0-10): 0    Anxiety (0-10): 0    Irritability/Aggitation (0-10): 0    Status After Intervention:  Improved    Participation Level:  Active Listener and Interactive    Participation Quality: Appropriate, Attentive and Sharing    Speech:  normal    Thought Process/Content: Logical    Affective Functioning: Congruent    Mood: Bright    Level of consciousness:  Alert and Attentive    Response to Learning: Able to verbalize current knowledge/experience    Endings: None Reported    Modes of Intervention: Education, Support and Socialization    Discipline Responsible: Certified Therapeutic Recreation Specialist    Electronically signed by Jewel Cox MA on 2/6/2020 at 9:10 AM

## 2020-02-06 NOTE — PROGRESS NOTES
tablet, Take 250 mg by mouth 2 times daily  atorvastatin (LIPITOR) 80 MG tablet, Take 1 tablet by mouth daily  famotidine (PEPCID) 20 MG tablet, Take 20 mg by mouth nightly  acetaminophen (TYLENOL) 500 MG tablet, Take 1,000 mg by mouth every 4 hours as needed for Pain or Fever  metFORMIN (GLUCOPHAGE) 500 MG tablet, Take 2 tablets by mouth 2 times daily (with meals)  loperamide (IMODIUM) 2 MG capsule, Take 2 mg by mouth every 6 hours as needed for Diarrhea     Past Medical History:   Diagnosis Date    Avascular necrosis of bone (HCC)     right hip    CAD (coronary artery disease)     Coronary atherosclerosis     Dementia     Dementia ([address]     Dizziness     cerebelar atrophy    Environmental allergies     Essential hypertension     H/O Doppler ultrasound 07/11/2016    carotid - normal    IBS (irritable bowel syndrome)     Mental disability     Recurrent falls     S/P PTCA (percutaneous transluminal coronary angioplasty) 06/16/2016    Cx OM stent    Seizure disorder (HCC)     Urge incontinence of urine     Wrist joint pain     chronic left        Patient Active Problem List   Diagnosis    Obsessive-compulsive disorder    IBS (irritable bowel syndrome)    Mental disability    Uncontrolled type 2 diabetes mellitus with diabetic polyneuropathy, without long-term current use of insulin ([address]    Coronary artery disease involving native coronary artery of native heart with unstable angina pectoris ([address]    Essential hypertension    Dyslipidemia    Ataxia    Late effects of CVA (cerebrovascular accident)    Chronic idiopathic constipation    Seizure disorder ([address]    Acute metabolic encephalopathy    Peripheral polyneuropathy    Hypoglycemia associated with diabetes ([address]    Dysmetria    Coronary atherosclerosis    Urge incontinence of urine    Dementia (HCC)    Dizziness    Recurrent falls    Wrist joint pain    Avascular necrosis of bone ([address]    AMS (altered mental status)    Acute exacerbation of chronic schizophrenia ([facility]    Schizophrenia ([facility]       Review of Systems    OBJECTIVE  Vital Signs:  Vitals:    02/06/20 0715   BP: (!) 148/72   Pulse: 84   Resp: 17   Temp: 97.7 °F (36.5 °C)   SpO2: 94%       Labs:  Recent Results (from the past 48 hour(s))   POCT Glucose    Collection Time: 02/04/20  4:41 PM   Result Value Ref Range    POC Glucose 109 (H) 70 - 99 MG/DL   POCT Glucose    Collection Time: 02/04/20  8:08 PM   Result Value Ref Range    POC Glucose 134 (H) 70 - 99 MG/DL   Comprehensive Metabolic Panel w/ Reflex to MG    Collection Time: 02/05/20  6:00 AM   Result Value Ref Range    Sodium 136 135 - 145 MMOL/L    Potassium 4.4 3.5 - 5.1 MMOL/L    Chloride 99 99 - 110 mMol/L    CO2 22 21 - 32 MMOL/L    BUN 15 6 - 23 MG/DL    CREATININE 1.2 0.9 - 1.3 MG/DL    Glucose 135 (H) 70 - 99 MG/DL    Calcium 9.2 8.3 - 10.6 MG/DL    Alb 4.1 3.4 - 5.0 GM/DL    Total Protein 6.6 6.4 - 8.2 GM/DL    Total Bilirubin 0.6 0.0 - 1.0 MG/DL    ALT 38 10 - 40 U/L    AST 22 15 - 37 IU/L    Alkaline Phosphatase 45 40 - 129 IU/L    GFR Non-African American >60 >60 mL/min/1.73m2    GFR African American >60 >60 mL/min/1.73m2    Anion Gap 15 4 - 16   Amylase    Collection Time: 02/05/20  6:00 AM   Result Value Ref Range    Amylase 109 25 - 115 U/L   Lipase    Collection Time: 02/05/20  6:00 AM   Result Value Ref Range    Lipase 61 (H) 13 - 60 IU/L   POCT Glucose    Collection Time: 02/05/20  7:46 AM   Result Value Ref Range    POC Glucose 128 (H) 70 - 99 MG/DL   POCT Glucose    Collection Time: 02/05/20 12:19 PM   Result Value Ref Range    POC Glucose 155 (H) 70 - 99 MG/DL   POCT Glucose    Collection Time: 02/05/20  5:01 PM   Result Value Ref Range    POC Glucose 161 (H) 70 - 99 MG/DL   POCT Glucose    Collection Time: 02/05/20  8:10 PM   Result Value Ref Range    POC Glucose 131 (H) 70 - 99 MG/DL   POCT Glucose    Collection Time: 02/06/20  6:28 AM   Result Value Ref Range    POC Glucose 111 (H) 70 - 99 MG/DL POCT Glucose    Collection Time: 02/06/20 12:01 PM   Result Value Ref Range    POC Glucose 94 70 - 99 MG/DL       PSYCHIATRIC ASSESSMENT / MENTAL STATUS EXAM:   Vitals: Blood pressure (!) 148/72, pulse 84, temperature 97.7 °F (36.5 °C), temperature source Temporal, resp. rate 17, height 6' 2\" (1.88 m), weight 195 lb (88.5 kg), SpO2 94 %. CONSTITUTIONAL:    Appearance: appears stated age. alert and oriented to person, place, time & situation. no acute distress. Adequate grooming and hygeine. Good eye contact. No prominent physical abnormalities. Attitude: Manner is cooperative and pleasant  Motor: No psychomotor agitation, retardation or abnormal movements noted  Speech: Clearly articulated; normal rate, volume, tone & amount. Language: intact understanding and production  Mood:depressed  Affect: euthymic, full range, non-labile, congruent with mood and content of speech  Thought Production: Spontaneous. Thought Form: Noted tangentiality and circumstantiality. with flight of ideas and loosening of associations. Thought Content/Perceptions: No WILLIAM, nted AVH, noted delusion  Insight:questionable  Judgment quesitonable  Memory: Immediate, recent, and remote appear intact, though not formally tested. Attention: maintained throughout interview  Fund of knowledge: Average  Gait/Balance: WNL/WNL         IMPRESSION:   No change in diagnosis          PLAN:  Psychiatric management:medication initiation and titration, group and individual therapy, safe and theraputic environment. Status of problem/condition: ?Improving  Medical co-morbidities: [facility], appreciate assistance  Legal Status:Pending  Disposition:estimated LOS: Pending. The treatment team reviewed with the patient the diagnosis and treatment recommendations to include the risks, benefits, and side effects of chosen medications. The patient verbalized understanding and agreed with the treatment regimen as outlined above.   The

## 2020-02-06 NOTE — GROUP NOTE
Group Therapy Note    Date: 2/6/2020    Group Start Time: 1300  Group End Time: 1330    Number of Participants: 3/3    Type: Exercise/Recreation Group    Group Topic/Objective: Balloon Noodle Toss    Notes:  Pt participated actively in group after Mild prompting. Pt initially kept eyes closed, but after prompting engaged appropriately. Pt noted to laugh and smile occasionally during the group. Status After Intervention:  Improved    Participation Level:  Active Listener and Interactive    Participation Quality: Appropriate and Attentive    Speech:  normal    Thought Process/Content: Logical    Affective Functioning: Congruent    Mood: Bright    Level of consciousness:  Alert and Attentive    Response to Learning: Able to verbalize current knowledge/experience    Endings: None Reported    Modes of Intervention: Activity and Movement    Discipline Responsible: Certified Therapeutic Recreation Specialist    Electronically signed by LUCY Herzog MA on 2/6/2020 at 1:34 PM

## 2020-02-06 NOTE — PROGRESS NOTES
Physical Therapy    Physical Therapy Treatment Note  Name: Latrice Martin MRN: [id number] :   1955   Date:  2020   Admission Date: 2020 Room:  1046/1046-01   Restrictions/Precautions:  Restrictions/Precautions  Restrictions/Precautions: General Precautions, Fall Risk  Required Braces or Orthoses?: No       Communication with other providers:  Nursing reports p is having much better day. Co-treat with OT. Subjective:  Patient states:  P speaking but difficult to understand. \"Its good to see you\"  Pain:   Location, Type, Intensity (0/10 to 10/10):  Reports left middle finger pain  Objective:    Observation:  P sitting up in wc. Awake and alert. Treatment, including education/measures:  Wheelchair mobility: P requires total A to propel wc but able to maintain legs lifted off the ground x 25' x 2 bouts. He was unable to lift legs during previous session. Transfers: Facilitated transfers from wc to RW with assist x 2. P requires max cues for hand placement on RW but is inconsistent with following cues. P performs 3 consecutive sit <> stands with max A x2. P stands with narrow PRISCILLA, trunk extension and decreased ability to bear weight on LEs. P with mod A for support and significant reliance on UEs. P stands without UEs to adjust pants and requires max A for balance. Following seated there. Ex, p performs 2 sit <> stands with mod A x 2 and minorly improved standing posture. P unable to perform with forward weight shift necessary to ambulate. P with rest breaks between bouts and is easily distracted with mod cues to redirect. There. Ex: P performs seated marching x 2 reps then 6 reps with significant fatigue noted. P performs LAQ x 6 reps RUMA with max verbal and visual cues. Educated on safety and not attempting to stand without staff to assist.  P left sitting up in wc in main room with chair alarm on and nursing aware.   Assessment / Impression:    P with significantly improved ability to participate and demos improved motor control and strength compared to evaluation. P does continue to demonstrate significant deficits in strength, balance and safety resulting in decline in mobility. Recommend continued skilled PT to address deficits and maximize functional potential.   Patient's tolerance of treatment:  good   Adverse Reaction: fatigue  Significant change in status and impact:  Improved transfers  Barriers to improvement:  Cognition, weakness, decreased safety  Plan for Next Session:    Continue to work on LE strength, balance and mobility  Time in:  1107  Time out:  1150  Timed treatment minutes:  43  Total treatment time:  37    Previously filed items:  Social/Functional History  Lives With: Other (comment)(Group home)  Type of Home: Facility(group home)  Home Layout: One level  Home Access: Level entry  ADL Assistance: Independent  Homemaking Assistance: Needs assistance  Homemaking Responsibilities: (Meals are brought to Pt at home)  Ambulation Assistance: Independent  Transfer Assistance: Independent  Active : No  Type of occupation: works at [email or web address]  Additional Comments: Pt unable to provide history. Information obtained per report in chart. Per nurse Pt was living with a roommate at a group home and had meals brought in but was indep with ADLs and he went to work at [email or web address] every day. Reports Pt had a seizure and since then he hasn't been able to walk or move his legs  Short term goals  Time Frame for Short term goals: 1 week  Short term goal 1: P will perform sit <> stand to LRAD with mod A. Short term goal 2: P will perform bed mobility with mod A. Short term goal 3: P will perform stand step transfer to LRAD with mod A.        Electronically signed by:    Mone Mayes, PT  2/6/2020, 1:52 PM

## 2020-02-06 NOTE — PROGRESS NOTES
Patient has been more bright and alert today, he has participated in groups and was laughing and having a good time. Patient appears to be gaining more independence with meals and getting around with the wheelchair however he is still having difficulty transferring. Patient denies SI/HI or AVH and states he is not depressed or anxious.

## 2020-02-06 NOTE — PROGRESS NOTES
Psychiatric Progress Note    Yonatan Heath  [id number]  02/06/20    CHIEF COMPLAINT: unchanged    HPI: Yonatan Heath  is a 60year old  male who presents for exacerbation of schizophrenia with suicidal ideation and severe depression. Pt noted recent exacerbation of psychosis and mood. Pt noted he currently feels safe and comfortable on the unit. Pt was in agreement with treatment team.  Pt was polite and cordial during the interview process. Pt agreed to start risperidone, to consider paliperidone long acting injectable. Pt was in agreement.     During today's interview, patient was alert and oriented to self only. Pt noted he is doing [place] today. \"  Pt noted \"slept OK last night. \"  Pt noted his appetite is \"OK. \"  Pt endorsed depression but is unable to rate it on a numeric scale. Pt denied anxiety, SI/HI and AVH. His speech is clearer today, however many of his answers are expressed with word salad. Case discussed with Dr. Jaswant Sigala. Plan is to decrease lamotrigine, start Depakote. Physical Therapy and Occupational Therapy have evaluated the patient and recommend continued therapy several times each week to overcome current deficits.     No Known Allergies    Medications Prior to Admission: ziprasidone (GEODON) 60 MG capsule, Take 1 capsule by mouth 2 times daily (with meals)  metoprolol succinate (TOPROL XL) 100 MG extended release tablet, Take 1 tablet by mouth daily  amLODIPine (NORVASC) 10 MG tablet, Take 0.5 tablets by mouth daily  levothyroxine (SYNTHROID) 75 MCG tablet, Take 1 tablet by mouth Daily  melatonin 3 MG TABS tablet, Take 3 mg by mouth daily  VIMPAT 200 MG tablet, TAKE ONE (1) TABLET BY MOUTH TWO TIMES DAILY  DULoxetine (CYMBALTA) 30 MG extended release capsule, Take 1 capsule by mouth daily  lisinopril (PRINIVIL;ZESTRIL) 10 MG tablet, Take 1 tablet by mouth daily  aspirin-dipyridamole (AGGRENOX)  MG per extended release capsule, Take 1 capsule by mouth nightly lamoTRIgine (LAMICTAL) 100 MG tablet, Take 250 mg by mouth 2 times daily  atorvastatin (LIPITOR) 80 MG tablet, Take 1 tablet by mouth daily  famotidine (PEPCID) 20 MG tablet, Take 20 mg by mouth nightly  acetaminophen (TYLENOL) 500 MG tablet, Take 1,000 mg by mouth every 4 hours as needed for Pain or Fever  metFORMIN (GLUCOPHAGE) 500 MG tablet, Take 2 tablets by mouth 2 times daily (with meals)  loperamide (IMODIUM) 2 MG capsule, Take 2 mg by mouth every 6 hours as needed for Diarrhea     Past Medical History:   Diagnosis Date    Avascular necrosis of bone (HCC)     right hip    CAD (coronary artery disease)     Coronary atherosclerosis     Dementia     Dementia ([address]     Dizziness     cerebelar atrophy    Environmental allergies     Essential hypertension     H/O Doppler ultrasound 07/11/2016    carotid - normal    IBS (irritable bowel syndrome)     Mental disability     Recurrent falls     S/P PTCA (percutaneous transluminal coronary angioplasty) 06/16/2016    Cx OM stent    Seizure disorder (HCC)     Urge incontinence of urine     Wrist joint pain     chronic left        Patient Active Problem List   Diagnosis    Obsessive-compulsive disorder    IBS (irritable bowel syndrome)    Mental disability    Uncontrolled type 2 diabetes mellitus with diabetic polyneuropathy, without long-term current use of insulin ([address]    Coronary artery disease involving native coronary artery of native heart with unstable angina pectoris ([address]    Essential hypertension    Dyslipidemia    Ataxia    Late effects of CVA (cerebrovascular accident)    Chronic idiopathic constipation    Seizure disorder ([address]    Acute metabolic encephalopathy    Peripheral polyneuropathy    Hypoglycemia associated with diabetes ([address]    Dysmetria    Coronary atherosclerosis    Urge incontinence of urine    Dementia (HCC)    Dizziness    Recurrent falls    Wrist joint pain    Avascular necrosis of bone ([address]    AMS benefits, and side effects of chosen medications. The patient verbalized understanding and agreed with the treatment regimen as outlined above. The patient was encouraged to participate in groups. Medical records, Labs, Diagnotic tests reviewed  q15 min safety checks for safety  Interval History  Review current labs  Continue current medications  Supportive Therapy Provided  Continue PT/OT treatment per their recommendations  Pt had an opportunity to ask questions and address concerns  Pt encouraged to continue therapy group or individual.  Pt was in agreement with treatment plan. The risks benefits and side effects of medications were discussed with the patient, including alternatives and no treatment.     Electronically signed by JOSE Gaytan CNP on 2/6/2020 at 10:43 AM

## 2020-02-07 LAB
CHOLESTEROL: 100 MG/DL
GLUCOSE BLD-MCNC: 109 MG/DL (ref 70–99)
GLUCOSE BLD-MCNC: 120 MG/DL (ref 70–99)
GLUCOSE BLD-MCNC: 167 MG/DL (ref 70–99)
GLUCOSE BLD-MCNC: 93 MG/DL (ref 70–99)
HDLC SERPL-MCNC: 41 MG/DL
LDL CHOLESTEROL DIRECT: 53 MG/DL
TRIGL SERPL-MCNC: 66 MG/DL
VITAMIN D 25-HYDROXY: 8.73 NG/ML

## 2020-02-07 PROCEDURE — 82306 VITAMIN D 25 HYDROXY: CPT

## 2020-02-07 PROCEDURE — 1240000000 HC EMOTIONAL WELLNESS R&B

## 2020-02-07 PROCEDURE — 36415 COLL VENOUS BLD VENIPUNCTURE: CPT

## 2020-02-07 PROCEDURE — 82962 GLUCOSE BLOOD TEST: CPT

## 2020-02-07 PROCEDURE — 80061 LIPID PANEL: CPT

## 2020-02-07 PROCEDURE — 6370000000 HC RX 637 (ALT 250 FOR IP): Performed by: PSYCHIATRY & NEUROLOGY

## 2020-02-07 PROCEDURE — 6370000000 HC RX 637 (ALT 250 FOR IP): Performed by: HOSPITALIST

## 2020-02-07 PROCEDURE — 83721 ASSAY OF BLOOD LIPOPROTEIN: CPT

## 2020-02-07 PROCEDURE — 99233 SBSQ HOSP IP/OBS HIGH 50: CPT | Performed by: NURSE PRACTITIONER

## 2020-02-07 RX ADMIN — LORAZEPAM 1 MG: 1 TABLET ORAL at 20:43

## 2020-02-07 RX ADMIN — ZIPRASIDONE HYDROCHLORIDE 80 MG: 40 CAPSULE ORAL at 18:00

## 2020-02-07 RX ADMIN — BENZTROPINE MESYLATE 1 MG: 1 TABLET ORAL at 09:43

## 2020-02-07 RX ADMIN — DULOXETINE HYDROCHLORIDE 30 MG: 30 CAPSULE, DELAYED RELEASE ORAL at 09:43

## 2020-02-07 RX ADMIN — LEVOTHYROXINE SODIUM 75 MCG: 0.07 TABLET ORAL at 09:54

## 2020-02-07 RX ADMIN — LORAZEPAM 1 MG: 1 TABLET ORAL at 09:43

## 2020-02-07 RX ADMIN — ZIPRASIDONE HYDROCHLORIDE 80 MG: 40 CAPSULE ORAL at 09:42

## 2020-02-07 RX ADMIN — LACOSAMIDE 200 MG: 100 TABLET, FILM COATED ORAL at 09:43

## 2020-02-07 RX ADMIN — LAMOTRIGINE 150 MG: 100 TABLET ORAL at 09:43

## 2020-02-07 RX ADMIN — DIVALPROEX SODIUM 1000 MG: 500 TABLET, EXTENDED RELEASE ORAL at 09:43

## 2020-02-07 RX ADMIN — ATORVASTATIN CALCIUM 80 MG: 40 TABLET, FILM COATED ORAL at 09:43

## 2020-02-07 RX ADMIN — LACOSAMIDE 200 MG: 100 TABLET, FILM COATED ORAL at 20:42

## 2020-02-07 RX ADMIN — BENZTROPINE MESYLATE 1 MG: 1 TABLET ORAL at 20:43

## 2020-02-07 RX ADMIN — ASPIRIN AND DIPYRIDAMOLE 1 CAPSULE: 25; 200 CAPSULE, EXTENDED RELEASE ORAL at 20:41

## 2020-02-07 RX ADMIN — TAMSULOSIN HYDROCHLORIDE 0.4 MG: 0.4 CAPSULE ORAL at 09:43

## 2020-02-07 RX ADMIN — LAMOTRIGINE 150 MG: 100 TABLET ORAL at 20:41

## 2020-02-07 RX ADMIN — MELATONIN TAB 3 MG 3 MG: 3 TAB at 20:43

## 2020-02-07 ASSESSMENT — PAIN SCALES - GENERAL: PAINLEVEL_OUTOF10: 0

## 2020-02-07 NOTE — BH NOTE
Group Therapy Note    Date: 2/6/2020  Start Time: 1930  End Time:  2000  Number of Participants: 1      Type of Group: Nursing Education      Notes:  Offered educated to pt pertaining to current medication regime, uses and side effects. Status After Intervention:  Unchanged    Participation Level: Minimal    Participation Quality: Appropriate, Attentive and Sharing      Speech:  normal      Thought Process/Content: Logical      Affective Functioning: Congruent      Mood: calm      Level of consciousness:  Alert and Attentive      Response to Learning: Able to verbalize current knowledge/experience      Endings: None Reported    Modes of Intervention: Education      Discipline Responsible: Licensed Practical Nurse      Signature:   Kvng Beal LPN

## 2020-02-07 NOTE — GROUP NOTE
Group Therapy Note    Date: 2/7/2020    Group Start Time: 1115  Group End Time: 7316  Group Topic: Psychotherapy    [address] [facility]    TOÑITO Burgess LSW        Group Therapy Note    Attendees: 2/3     Patient's Goal:  Topic was identifying additional coping skills. Notes:  Patient was active and participated in group.     Status After Intervention:  Improved    Participation Level: Interactive    Participation Quality: Appropriate    Speech:  normal    Thought Process/Content: Linear    Affective Functioning: Congruent    Mood: elevated    Level of consciousness:  Alert and Attentive    Response to Learning: Capable of insight    Endings: None Reported    Modes of Intervention: Support, Clarifying and Problem-solving    Discipline Responsible: /Counselor      Signature:  TOÑITO Burgess LSW

## 2020-02-07 NOTE — GROUP NOTE
Group Therapy Note    Date: 2/7/2020    Group Start Time: 1325  Group End Time: 1350    Number of Participants: 1/4    Type: Exercise/Recreation Group    Group Topic/Objective: Chair Exercises    Notes:  Pt falling asleep during group.  Nurse took pt back to his room to allow him to rest.     Discipline Responsible: Certified Therapeutic Recreation Specialist    Electronically signed by Melina Razo [place], [place] on 2/7/2020 at 2:00 PM

## 2020-02-07 NOTE — PROGRESS NOTES
Psychiatric Progress Note    Rony Gallardo  [id number]  02/07/20    CHIEF COMPLAINT: unchanged    HPI: Rony Gallardo  is a 61 yo  male who presents for exacerbation of schizophrenia with suicidal ideations and depression severe, currently in withdrawals. Pt noted recent exacerbation of psychosis and mood. Pt noted he currently feels safe and comfortable on the unit. Pt was in agreement with treatment team.  Pt was polite and cordial during the interview process. Pt seen by Dr. Maria L Gunn. Patient agreed to start Depakote requiring Lamictal to be cut in half due to medication titration.     Pt noted he is doing [place] today. \"  Pt noted he is slept \"alright last night. \"  Pt noted his appetite is \"good. \"  Pt rated his depression a \"5\" on a scale of zero to ten with ten being the worst and zero being none. Pt rated his anxiety a \"0\" on the same scale. Pt denied thoughts to harm himself at this time. Pt denied any thoughts to harm anyone else. Pt denied any auditory or visual hallucinations. Some of his responses do not make sense in relationship to the question posed. Hospitalist has reviewed antihypertensive medications and adjusted.     No Known Allergies    Medications Prior to Admission: ziprasidone (GEODON) 60 MG capsule, Take 1 capsule by mouth 2 times daily (with meals)  metoprolol succinate (TOPROL XL) 100 MG extended release tablet, Take 1 tablet by mouth daily  amLODIPine (NORVASC) 10 MG tablet, Take 0.5 tablets by mouth daily  levothyroxine (SYNTHROID) 75 MCG tablet, Take 1 tablet by mouth Daily  melatonin 3 MG TABS tablet, Take 3 mg by mouth daily  VIMPAT 200 MG tablet, TAKE ONE (1) TABLET BY MOUTH TWO TIMES DAILY  DULoxetine (CYMBALTA) 30 MG extended release capsule, Take 1 capsule by mouth daily  lisinopril (PRINIVIL;ZESTRIL) 10 MG tablet, Take 1 tablet by mouth daily  aspirin-dipyridamole (AGGRENOX)  MG per extended release capsule, Take 1 capsule by mouth nightly   lamoTRIgine (LAMICTAL) 100 MG tablet, Take 250 mg by mouth 2 times daily  atorvastatin (LIPITOR) 80 MG tablet, Take 1 tablet by mouth daily  famotidine (PEPCID) 20 MG tablet, Take 20 mg by mouth nightly  acetaminophen (TYLENOL) 500 MG tablet, Take 1,000 mg by mouth every 4 hours as needed for Pain or Fever  metFORMIN (GLUCOPHAGE) 500 MG tablet, Take 2 tablets by mouth 2 times daily (with meals)  loperamide (IMODIUM) 2 MG capsule, Take 2 mg by mouth every 6 hours as needed for Diarrhea     Past Medical History:   Diagnosis Date    Avascular necrosis of bone (HCC)     right hip    CAD (coronary artery disease)     Coronary atherosclerosis     Dementia     Dementia ([address]     Dizziness     cerebelar atrophy    Environmental allergies     Essential hypertension     H/O Doppler ultrasound 07/11/2016    carotid - normal    IBS (irritable bowel syndrome)     Mental disability     Recurrent falls     S/P PTCA (percutaneous transluminal coronary angioplasty) 06/16/2016    Cx OM stent    Seizure disorder (HCC)     Urge incontinence of urine     Wrist joint pain     chronic left        Patient Active Problem List   Diagnosis    Obsessive-compulsive disorder    IBS (irritable bowel syndrome)    Mental disability    Uncontrolled type 2 diabetes mellitus with diabetic polyneuropathy, without long-term current use of insulin ([address]    Coronary artery disease involving native coronary artery of native heart with unstable angina pectoris ([address]    Essential hypertension    Dyslipidemia    Ataxia    Late effects of CVA (cerebrovascular accident)    Chronic idiopathic constipation    Seizure disorder ([address]    Acute metabolic encephalopathy    Peripheral polyneuropathy    Hypoglycemia associated with diabetes ([address]    Dysmetria    Coronary atherosclerosis    Urge incontinence of urine    Dementia (HCC)    Dizziness    Recurrent falls    Wrist joint pain    Avascular necrosis of bone ([address]    AMS (altered mental

## 2020-02-07 NOTE — PLAN OF CARE
Problem: Falls - Risk of:  Goal: Will remain free from falls  Description  Will remain free from falls  2/6/2020 2059 by [employer], LPN  Outcome: Ongoing     Problem: Falls - Risk of:  Goal: Absence of physical injury  Description  Absence of physical injury  2/6/2020 2059 by [employer], LPN  Outcome: Ongoing     Problem: Altered Mood, Psychotic Behavior:  Goal: Able to demonstrate trust by eating, participating in treatment and following staff's direction  Description  Able to demonstrate trust by eating, participating in treatment and following staff's direction  2/6/2020 2059 by [employer], LPN  Outcome: Ongoing     Problem: Altered Mood, Psychotic Behavior:  Goal: Able to verbalize decrease in frequency and intensity of hallucinations  Description  Able to verbalize decrease in frequency and intensity of hallucinations  2/6/2020 2059 by [employer], LPN  Outcome: Ongoing     Problem: Altered Mood, Psychotic Behavior:  Goal: Able to verbalize reality based thinking  Description  Able to verbalize reality based thinking  2/6/2020 2059 by [employer], LPN  Outcome: Ongoing     Problem: Altered Mood, Psychotic Behavior:  Goal: Absence of self-harm  Description  Absence of self-harm  2/6/2020 2059 by [employer], LPN  Outcome: Ongoing     Problem: Altered Mood, Psychotic Behavior:  Goal: Ability to achieve adequate nutritional intake will improve  Description  Ability to achieve adequate nutritional intake will improve  2/6/2020 2059 by [employer], LPN  Outcome: Ongoing     Problem: Altered Mood, Psychotic Behavior:  Goal: Ability to interact with others will improve  Description  Ability to interact with others will improve  2/6/2020 2059 by [employer], LPN  Outcome: Ongoing     Problem: Altered Mood, Psychotic Behavior:  Goal: Compliance with prescribed medication regimen will improve  Description  Compliance with prescribed medication regimen will improve  2/6/2020 2059 by [employer], LPN  Outcome: Ongoing     Problem: Altered Mood, Psychotic Behavior:  Goal: Patient specific goal  Description  Patient specific goal  2/6/2020 2059 by Carolina Benjamin LPN  Outcome: Ongoing     Problem: Risk for Impaired Skin Integrity  Goal: Tissue integrity - skin and mucous membranes  Description  Structural intactness and normal physiological function of skin and  mucous membranes.   2/6/2020 2059 by Carolina Benjamin LPN  Outcome: Ongoing     Problem: Pain:  Goal: Pain level will decrease  Description  Pain level will decrease  2/6/2020 2059 by Carolina Benjamin LPN  Outcome: Ongoing     Problem: Pain:  Goal: Control of acute pain  Description  Control of acute pain  2/6/2020 2059 by Carolina Benjamin LPN  Outcome: Ongoing     Problem: Pain:  Goal: Control of chronic pain  Description  Control of chronic pain  2/6/2020 2059 by Carolina Benjamin LPN  Outcome: Ongoing

## 2020-02-07 NOTE — GROUP NOTE
Group Therapy Note    Date: 2/7/2020    Group Start Time: 1500  Group End Time: 2248  Group Topic: Recreational    [address] [facility]    LUCY Mtz, MA        Group Therapy Note    Attendees: 2/4       Notes:  Pts participated in recreational therapy group; Leisure Choice and Socialization. Pts were asked to choose between 2 different leisure activities for group. Purpose was to encourage decision making, understanding how leisure can help with mood, and increase socialization.      Pt sleeping and allowed to continue to rest.       Discipline Responsible: Certified Therapeutic Recreation Specialist      Signature:  Bailey Mtz [place]

## 2020-02-07 NOTE — GROUP NOTE
Group Therapy Note    Date: 2/7/2020    Group Start Time: 0830  Group End Time: 0900    Number of Participants: 4/4    Type: Morning Goals Group/ Community Meeting    Group Topic/Objective: Set Goal For The Day and to review Unit Rules and Regulations. Patient's Goal:  Pt states \"Watch the television. \"    Notes:  Pt presented as pleasant and cooperative during the group. Pt noted to joke with therapist and was able to express desire to be shaved. Pt's nurse informed of pt's request. Pt expressed feeling \"alright\" and grateful that he \"don't have to do anything\" today which means he does not have to go outside. Pt unable to provide numerical values, but does endorse feelings of depression and anxiety. Depression (0-10): Pt states \"a little. \"    Anxiety (0-10): Pt states \"a little. \"    Irritability/Aggitation (0-10): 0    Status After Intervention:  Improved    Participation Level:  Active Listener and Interactive    Participation Quality: Appropriate, Attentive and Sharing    Speech:  normal    Thought Process/Content: Logical    Affective Functioning: Blunted    Mood: Blunted    Level of consciousness:  Alert and Attentive    Response to Learning: Able to verbalize current knowledge/experience    Endings: None Reported    Modes of Intervention: Education, Support and Socialization    Discipline Responsible: Certified Therapeutic Recreation Specialist    Electronically signed by Mario Espinoza MA on 2/7/2020 at 9:12 AM

## 2020-02-08 LAB
GLUCOSE BLD-MCNC: 101 MG/DL (ref 70–99)
GLUCOSE BLD-MCNC: 168 MG/DL (ref 70–99)
GLUCOSE BLD-MCNC: 96 MG/DL (ref 70–99)
GLUCOSE BLD-MCNC: 97 MG/DL (ref 70–99)

## 2020-02-08 PROCEDURE — 6370000000 HC RX 637 (ALT 250 FOR IP): Performed by: PSYCHIATRY & NEUROLOGY

## 2020-02-08 PROCEDURE — 99232 SBSQ HOSP IP/OBS MODERATE 35: CPT | Performed by: PSYCHIATRY & NEUROLOGY

## 2020-02-08 PROCEDURE — 6370000000 HC RX 637 (ALT 250 FOR IP): Performed by: HOSPITALIST

## 2020-02-08 PROCEDURE — 1240000000 HC EMOTIONAL WELLNESS R&B

## 2020-02-08 PROCEDURE — 82962 GLUCOSE BLOOD TEST: CPT

## 2020-02-08 RX ORDER — AMLODIPINE BESYLATE 5 MG/1
5 TABLET ORAL DAILY
Status: DISCONTINUED | OUTPATIENT
Start: 2020-02-09 | End: 2020-02-25 | Stop reason: HOSPADM

## 2020-02-08 RX ORDER — METOPROLOL SUCCINATE 50 MG/1
50 TABLET, EXTENDED RELEASE ORAL DAILY
Status: DISCONTINUED | OUTPATIENT
Start: 2020-02-09 | End: 2020-02-25 | Stop reason: HOSPADM

## 2020-02-08 RX ADMIN — LORAZEPAM 1 MG: 1 TABLET ORAL at 08:17

## 2020-02-08 RX ADMIN — TAMSULOSIN HYDROCHLORIDE 0.4 MG: 0.4 CAPSULE ORAL at 08:19

## 2020-02-08 RX ADMIN — ZIPRASIDONE HYDROCHLORIDE 80 MG: 40 CAPSULE ORAL at 08:18

## 2020-02-08 RX ADMIN — DIVALPROEX SODIUM 1000 MG: 500 TABLET, EXTENDED RELEASE ORAL at 08:18

## 2020-02-08 RX ADMIN — LACOSAMIDE 200 MG: 100 TABLET, FILM COATED ORAL at 20:36

## 2020-02-08 RX ADMIN — LEVOTHYROXINE SODIUM 75 MCG: 0.07 TABLET ORAL at 08:17

## 2020-02-08 RX ADMIN — TRAZODONE HYDROCHLORIDE 50 MG: 50 TABLET ORAL at 20:35

## 2020-02-08 RX ADMIN — LACOSAMIDE 200 MG: 100 TABLET, FILM COATED ORAL at 08:17

## 2020-02-08 RX ADMIN — LORAZEPAM 1 MG: 1 TABLET ORAL at 20:36

## 2020-02-08 RX ADMIN — METOPROLOL SUCCINATE 100 MG: 50 TABLET, EXTENDED RELEASE ORAL at 08:19

## 2020-02-08 RX ADMIN — LAMOTRIGINE 150 MG: 100 TABLET ORAL at 20:35

## 2020-02-08 RX ADMIN — DULOXETINE HYDROCHLORIDE 30 MG: 30 CAPSULE, DELAYED RELEASE ORAL at 08:18

## 2020-02-08 RX ADMIN — ZIPRASIDONE HYDROCHLORIDE 80 MG: 40 CAPSULE ORAL at 16:56

## 2020-02-08 RX ADMIN — LAMOTRIGINE 150 MG: 100 TABLET ORAL at 08:18

## 2020-02-08 RX ADMIN — ASPIRIN AND DIPYRIDAMOLE 1 CAPSULE: 25; 200 CAPSULE, EXTENDED RELEASE ORAL at 20:34

## 2020-02-08 RX ADMIN — AMLODIPINE BESYLATE 10 MG: 5 TABLET ORAL at 08:17

## 2020-02-08 RX ADMIN — BENZTROPINE MESYLATE 1 MG: 1 TABLET ORAL at 08:19

## 2020-02-08 RX ADMIN — ATORVASTATIN CALCIUM 80 MG: 40 TABLET, FILM COATED ORAL at 08:18

## 2020-02-08 RX ADMIN — MELATONIN TAB 3 MG 3 MG: 3 TAB at 20:36

## 2020-02-08 RX ADMIN — BENZTROPINE MESYLATE 1 MG: 1 TABLET ORAL at 20:35

## 2020-02-08 RX ADMIN — LISINOPRIL 10 MG: 10 TABLET ORAL at 08:19

## 2020-02-08 ASSESSMENT — PAIN SCALES - GENERAL: PAINLEVEL_OUTOF10: 0

## 2020-02-08 ASSESSMENT — PAIN SCALES - WONG BAKER: WONGBAKER_NUMERICALRESPONSE: 0

## 2020-02-08 NOTE — PROGRESS NOTES
mg by mouth 2 times daily  atorvastatin (LIPITOR) 80 MG tablet, Take 1 tablet by mouth daily  famotidine (PEPCID) 20 MG tablet, Take 20 mg by mouth nightly  acetaminophen (TYLENOL) 500 MG tablet, Take 1,000 mg by mouth every 4 hours as needed for Pain or Fever  metFORMIN (GLUCOPHAGE) 500 MG tablet, Take 2 tablets by mouth 2 times daily (with meals)  loperamide (IMODIUM) 2 MG capsule, Take 2 mg by mouth every 6 hours as needed for Diarrhea     Past Medical History:   Diagnosis Date    Avascular necrosis of bone (HCC)     right hip    CAD (coronary artery disease)     Coronary atherosclerosis     Dementia     Dementia ([address]     Dizziness     cerebelar atrophy    Environmental allergies     Essential hypertension     H/O Doppler ultrasound 07/11/2016    carotid - normal    IBS (irritable bowel syndrome)     Mental disability     Recurrent falls     S/P PTCA (percutaneous transluminal coronary angioplasty) 06/16/2016    Cx OM stent    Seizure disorder (HCC)     Urge incontinence of urine     Wrist joint pain     chronic left        Patient Active Problem List   Diagnosis    Obsessive-compulsive disorder    IBS (irritable bowel syndrome)    Mental disability    Uncontrolled type 2 diabetes mellitus with diabetic polyneuropathy, without long-term current use of insulin ([address]    Coronary artery disease involving native coronary artery of native heart with unstable angina pectoris ([address]    Essential hypertension    Dyslipidemia    Ataxia    Late effects of CVA (cerebrovascular accident)    Chronic idiopathic constipation    Seizure disorder ([address]    Acute metabolic encephalopathy    Peripheral polyneuropathy    Hypoglycemia associated with diabetes ([address]    Dysmetria    Coronary atherosclerosis    Urge incontinence of urine    Dementia ([address]    Dizziness    Recurrent falls    Wrist joint pain    Avascular necrosis of bone ([address]    AMS (altered mental status)    Acute exacerbation of chronic schizophrenia ([facility]    Schizophrenia ([facility]       Review of Systems    OBJECTIVE  Vital Signs:  Vitals:    02/08/20 0730   BP: (!) 132/59   Pulse: 90   Resp: 17   Temp: 97.2 °F (36.2 °C)   SpO2:        Labs:  Recent Results (from the past 48 hour(s))   POCT Glucose    Collection Time: 02/06/20 12:01 PM   Result Value Ref Range    POC Glucose 94 70 - 99 MG/DL   POCT Glucose    Collection Time: 02/06/20  4:26 PM   Result Value Ref Range    POC Glucose 115 (H) 70 - 99 MG/DL   POCT Glucose    Collection Time: 02/06/20  7:34 PM   Result Value Ref Range    POC Glucose 165 (H) 70 - 99 MG/DL   Lipid panel    Collection Time: 02/07/20  6:00 AM   Result Value Ref Range    Triglycerides 66 <150 MG/DL    Cholesterol 100 <200 MG/DL    HDL 41 >40 MG/DL    LDL Direct 53 <100 MG/DL   Vitamin D 25 hydroxy    Collection Time: 02/07/20  6:00 AM   Result Value Ref Range    Vit D, 25-Hydroxy 8.73 (L) >20 NG/ML   POCT Glucose    Collection Time: 02/07/20  7:40 AM   Result Value Ref Range    POC Glucose 120 (H) 70 - 99 MG/DL   POCT Glucose    Collection Time: 02/07/20 12:12 PM   Result Value Ref Range    POC Glucose 93 70 - 99 MG/DL   POCT Glucose    Collection Time: 02/07/20  5:17 PM   Result Value Ref Range    POC Glucose 109 (H) 70 - 99 MG/DL   POCT Glucose    Collection Time: 02/07/20  8:04 PM   Result Value Ref Range    POC Glucose 167 (H) 70 - 99 MG/DL   POCT Glucose    Collection Time: 02/08/20  7:31 AM   Result Value Ref Range    POC Glucose 101 (H) 70 - 99 MG/DL       PSYCHIATRIC ASSESSMENT / MENTAL STATUS EXAM:   Vitals: Blood pressure (!) 132/59, pulse 90, temperature 97.2 °F (36.2 °C), temperature source Temporal, resp. rate 17, height 6' 2\" (1.88 m), weight 195 lb (88.5 kg), SpO2 97 %. CONSTITUTIONAL:    Appearance: appears stated age. alert and oriented to person, place, time & situation. no acute distress. Adequate grooming and hygeine. Good eye contact. No prominent physical abnormalities. Attitude:  Manner is cooperative and pleasant  Motor: No psychomotor agitation, retardation or abnormal movements noted  Speech: Clearly articulated; normal rate, volume, tone & amount. Language: intact understanding and production  Mood:depressed  Affect: euthymic, full range, non-labile, congruent with mood and content of speech  Thought Production: Spontaneous. Thought Form: Noted tangentiality and circumstantiality. with flight of ideas and loosening of associations. Thought Content/Perceptions: No WILLIAM, nted AVH, noted delusion  Insight:questionable  Judgment quesitonable  Memory: Immediate, recent, and remote appear intact, though not formally tested. Attention: maintained throughout interview  Fund of knowledge: Average  Gait/Balance: WNL/WNL         IMPRESSION:   No change in diagnosis          PLAN:  Psychiatric management:medication initiation and titration, group and individual therapy, safe and theraputic environment. Status of problem/condition: ?Improving  Medical co-morbidities: [facility], appreciate assistance  Legal Status:Pending  Disposition:estimated LOS: Pending. The treatment team reviewed with the patient the diagnosis and treatment recommendations to include the risks, benefits, and side effects of chosen medications. The patient verbalized understanding and agreed with the treatment regimen as outlined above. The patient was encouraged to participate in groups. Medical records, Labs, Diagnotic tests reviewed  q15 min safety checks for safety  Interval History. Review current labs  Start depakote ER 1000 mg PO QHS for mood. Reduce lamictal to 150 mg PO BID due to start depakote ER. Continue current medications  Supportive Therapy Provided  Pt had an opportunity to ask questions and address concerns  Pt encouraged to continue therapy group or individual.  Pt was in agreement with treatment plan.   The risks benefits and side effects of medications were discussed with the patient, including alternatives and no treatment.     Electronically signed by Bettye Ugalde DO on 2/8/2020 at 8:33 AM

## 2020-02-08 NOTE — GROUP NOTE
Group Therapy Note    Date: 2/8/2020    Group Start Time: 0935  Group End Time: 1003  Group Topic: [address]    Number of Participants: 3/4    Type: Morning Goals Group/ Community Meeting    Group Topic/Objective: Set Goal For The Day and to review Unit Rules and Regulations. Patient's Goal: Pt stated \"I don't know what it is. \"    Notes: Pt was cooperative and attentive at beginning of group. Approximately prison through pt fell asleep, was difficult to arouse and keep attention. After group pt's nurse took him to his room and put him to bed. Depression (0-10): Pt unable to assign numeric value, but stated he wasn't \"sad\". Anxiety (0-10): Pt unable to assign numeric value, but stated he had \"a lot\" of anxiety. When prompted as to why, he was unable to give a clear answer. Irritability/Aggitation (0-10): Pt unable to assign numeric value but stated \"a little\". Status After Intervention:  Unchanged    Participation Level: Minimal    Participation Quality: Attentive and Lethargic    Speech:  normal at times and other times pt mumbles and words are unclear.     Thought Process/Content: Linear    Affective Functioning: Incongruent    Mood: euthymic and lethargic    Level of consciousness:  Drowsy    Response to Learning: Able to retain information    Endings: None Reported    Modes of Intervention: Education, Support and Socialization    Discipline Responsible: Psycho-    Electronically signed by OANH Ruiz on 2/8/2020 at 10:24 AM

## 2020-02-08 NOTE — BH NOTE
Patient is up in common area and watching television. He has been quite and not very talkative,will answer questions when asked. He is alert, LCTA, bowel sounds present in all four quadrants. Took all his meds and denies pain, SI, HI,AVH not observed. Vitals were 97.6-81-/61-97% on room air. Blood sugar was 167 and he received coverage per order.

## 2020-02-08 NOTE — PLAN OF CARE
Problem: Falls - Risk of:  Goal: Will remain free from falls  Description  Will remain free from falls  2/7/2020 2113 by Nia Silverman LPN  Outcome: Met This Shift  2/7/2020 1309 by Faiza Butler RN  Outcome: Ongoing  2/7/2020 0916 by Faiza Butler RN  Outcome: Ongoing  Goal: Absence of physical injury  Description  Absence of physical injury  2/7/2020 2113 by Nia iSlverman LPN  Outcome: Met This Shift  2/7/2020 1309 by Faiza Butler RN  Outcome: Ongoing  2/7/2020 0916 by Faiza Butler RN  Outcome: Ongoing     Problem: Altered Mood, Psychotic Behavior:  Goal: Absence of self-harm  Description  Absence of self-harm  2/7/2020 2113 by Nia Silverman LPN  Outcome: Met This Shift  2/7/2020 1309 by Faiza Butler RN  Outcome: Ongoing  2/7/2020 0916 by Faiza Butler RN  Outcome: Ongoing  Goal: Compliance with prescribed medication regimen will improve  Description  Compliance with prescribed medication regimen will improve  2/7/2020 2113 by Nia Silverman LPN  Outcome: Met This Shift  2/7/2020 1309 by Faiza Butler RN  Outcome: Ongoing  2/7/2020 0916 by Faiza Butler RN  Outcome: Ongoing     Problem: Pain:  Goal: Pain level will decrease  Description  Pain level will decrease  2/7/2020 2113 by Nia Silverman LPN  Outcome: Met This Shift  2/7/2020 1309 by Faiza Butler RN  Outcome: Ongoing  2/7/2020 0916 by Faiza Butler RN  Outcome: Ongoing  Goal: Control of acute pain  Description  Control of acute pain  2/7/2020 2113 by Nia Silverman LPN  Outcome: Met This Shift  2/7/2020 1309 by Faiza Butler RN  Outcome: Ongoing  2/7/2020 0916 by Faiza Butler RN  Outcome: Ongoing  Goal: Control of chronic pain  Description  Control of chronic pain  2/7/2020 2113 by Nia Silverman LPN  Outcome: Met This Shift  2/7/2020 1309 by Faiza Butler RN  Outcome: Ongoing  2/7/2020 0916 by Faiza Butler RN  Outcome: Ongoing

## 2020-02-08 NOTE — GROUP NOTE
Group Therapy Note    Date: 2/8/2020    Group Start Time: 1510  Group End Time: 9177  Group Topic: Healthy Living/Wellness    [address] [place] [address] Psych Unit      Group Therapy Note    Attendees: 2/4    Group Activity: Cornhole     Notes: Pt appeared to enjoy activity but was challenging even with mod A/ cueing from therapist. Had difficulty releasing bag to be thrown. Also difficulty organizing fluid movement while throwing; would hit wheelchair or leg. Pt noted to be smiling, bobbing his head, and singing along to music. Enjoyed talking with therapist about music he likes. Status After Intervention:  Improved    Participation Level:  Active Listener and Interactive    Participation Quality: Appropriate and Attentive      Speech:  normal      Thought Process/Content: Logical  Linear      Affective Functioning: Congruent      Mood: euthymic      Level of consciousness:  Alert and Attentive      Response to Learning: Able to verbalize current knowledge/experience      Endings: None Reported    Modes of Intervention: Socialization, Activity and Movement      Discipline Responsible: Psychoeducational Specialist    Electronically signed by OANH Ramos on 2/8/2020 at 3:53 PM

## 2020-02-09 LAB
GLUCOSE BLD-MCNC: 102 MG/DL (ref 70–99)
GLUCOSE BLD-MCNC: 111 MG/DL (ref 70–99)
GLUCOSE BLD-MCNC: 146 MG/DL (ref 70–99)
GLUCOSE BLD-MCNC: 86 MG/DL (ref 70–99)

## 2020-02-09 PROCEDURE — 6370000000 HC RX 637 (ALT 250 FOR IP): Performed by: PSYCHIATRY & NEUROLOGY

## 2020-02-09 PROCEDURE — 82962 GLUCOSE BLOOD TEST: CPT

## 2020-02-09 PROCEDURE — 6370000000 HC RX 637 (ALT 250 FOR IP): Performed by: HOSPITALIST

## 2020-02-09 PROCEDURE — 99232 SBSQ HOSP IP/OBS MODERATE 35: CPT | Performed by: PSYCHIATRY & NEUROLOGY

## 2020-02-09 PROCEDURE — 1240000000 HC EMOTIONAL WELLNESS R&B

## 2020-02-09 PROCEDURE — 6370000000 HC RX 637 (ALT 250 FOR IP): Performed by: INTERNAL MEDICINE

## 2020-02-09 RX ADMIN — LISINOPRIL 10 MG: 10 TABLET ORAL at 07:42

## 2020-02-09 RX ADMIN — DULOXETINE HYDROCHLORIDE 30 MG: 30 CAPSULE, DELAYED RELEASE ORAL at 07:41

## 2020-02-09 RX ADMIN — LEVOTHYROXINE SODIUM 75 MCG: 0.07 TABLET ORAL at 05:31

## 2020-02-09 RX ADMIN — DIVALPROEX SODIUM 1000 MG: 500 TABLET, EXTENDED RELEASE ORAL at 07:42

## 2020-02-09 RX ADMIN — LAMOTRIGINE 150 MG: 100 TABLET ORAL at 07:41

## 2020-02-09 RX ADMIN — LORAZEPAM 1 MG: 1 TABLET ORAL at 21:02

## 2020-02-09 RX ADMIN — MELATONIN TAB 3 MG 3 MG: 3 TAB at 21:02

## 2020-02-09 RX ADMIN — AMLODIPINE BESYLATE 5 MG: 5 TABLET ORAL at 07:42

## 2020-02-09 RX ADMIN — ATORVASTATIN CALCIUM 80 MG: 40 TABLET, FILM COATED ORAL at 07:41

## 2020-02-09 RX ADMIN — LACOSAMIDE 200 MG: 100 TABLET, FILM COATED ORAL at 21:02

## 2020-02-09 RX ADMIN — LAMOTRIGINE 125 MG: 100 TABLET ORAL at 21:02

## 2020-02-09 RX ADMIN — METOPROLOL SUCCINATE 50 MG: 50 TABLET, EXTENDED RELEASE ORAL at 07:41

## 2020-02-09 RX ADMIN — BENZTROPINE MESYLATE 1 MG: 1 TABLET ORAL at 07:42

## 2020-02-09 RX ADMIN — ZIPRASIDONE HYDROCHLORIDE 80 MG: 40 CAPSULE ORAL at 07:42

## 2020-02-09 RX ADMIN — LORAZEPAM 1 MG: 1 TABLET ORAL at 07:42

## 2020-02-09 RX ADMIN — ZIPRASIDONE HYDROCHLORIDE 60 MG: 20 CAPSULE ORAL at 17:11

## 2020-02-09 RX ADMIN — ASPIRIN AND DIPYRIDAMOLE 1 CAPSULE: 25; 200 CAPSULE, EXTENDED RELEASE ORAL at 21:02

## 2020-02-09 RX ADMIN — LACOSAMIDE 200 MG: 100 TABLET, FILM COATED ORAL at 07:42

## 2020-02-09 RX ADMIN — BENZTROPINE MESYLATE 1 MG: 1 TABLET ORAL at 21:02

## 2020-02-09 RX ADMIN — TAMSULOSIN HYDROCHLORIDE 0.4 MG: 0.4 CAPSULE ORAL at 07:42

## 2020-02-09 ASSESSMENT — PAIN SCALES - GENERAL: PAINLEVEL_OUTOF10: 0

## 2020-02-09 NOTE — GROUP NOTE
Group Therapy Note    Date: 2/9/2020    Group Start Time: 0900  Group End Time: 9930  Group Topic: Group Therapy    [address]    Number of Participants: 2/3    Type: Morning Goals Group/ Community Meeting    Group Topic/Objective: Set Goal For The Day and to review Unit Rules and Regulations. Patient's Goal: Pt stated \"to watch something good on [employer] \"    Notes: Pt was more interactive and alert this group. Thoughts and words appeared to slightly unorganized and unrelated to topic at hand AEB stuttering and trouble word finding. Pt unable to state numeric values but is able to answer yes/ no. Depression (0-10): \"No\"    Anxiety (0-10): \"A little\"    Irritability/Aggitation (0-10): \"No\"    Status After Intervention:  Improved    Participation Level:  Active Listener and Interactive    Participation Quality: Attentive and Sharing    Speech:  normal and stuttering    Thought Process/Content: Linear  Flight of ideas    Affective Functioning: Congruent    Mood: euthymic    Level of consciousness:  Alert and Attentive    Response to Learning: Able to verbalize current knowledge/experience    Endings: None Reported    Modes of Intervention: Education, Support and Socialization       Discipline Responsible: Psycho-    Electronically signed by OANH Velazquez on 2/9/2020 at 9:51 AM

## 2020-02-09 NOTE — PLAN OF CARE
Problem: Falls - Risk of:  Goal: Will remain free from falls  Description  Will remain free from falls  Outcome: Met This Shift  Goal: Absence of physical injury  Description  Absence of physical injury  Outcome: Met This Shift     Problem: Altered Mood, Psychotic Behavior:  Goal: Absence of self-harm  Description  Absence of self-harm  Outcome: Met This Shift  Goal: Compliance with prescribed medication regimen will improve  Description  Compliance with prescribed medication regimen will improve  Outcome: Met This Shift     Problem: Pain:  Goal: Pain level will decrease  Description  Pain level will decrease  Outcome: Met This Shift  Goal: Control of acute pain  Description  Control of acute pain  Outcome: Met This Shift  Goal: Control of chronic pain  Description  Control of chronic pain  Outcome: Met This Shift     Problem: Altered Mood, Psychotic Behavior:  Goal: Able to demonstrate trust by eating, participating in treatment and following staff's direction  Description  Able to demonstrate trust by eating, participating in treatment and following staff's direction  Outcome: Ongoing  Goal: Able to verbalize decrease in frequency and intensity of hallucinations  Description  Able to verbalize decrease in frequency and intensity of hallucinations  Outcome: Ongoing  Goal: Able to verbalize reality based thinking  Description  Able to verbalize reality based thinking  Outcome: Ongoing  Goal: Ability to achieve adequate nutritional intake will improve  Description  Ability to achieve adequate nutritional intake will improve  Outcome: Ongoing  Goal: Ability to interact with others will improve  Description  Ability to interact with others will improve  Outcome: Ongoing  Goal: Patient specific goal  Description  Patient specific goal  Outcome: Ongoing     Problem: Risk for Impaired Skin Integrity  Goal: Tissue integrity - skin and mucous membranes  Description  Structural intactness and normal physiological function of skin and  mucous membranes.   Outcome: Ongoing

## 2020-02-09 NOTE — GROUP NOTE
Group Therapy Note    Date: 2/9/2020    Group Start Time: 7890  Group End Time: 1313  Group Topic: Healthy Living/Wellness    [address] [place] [address] Psych Unit      Group Therapy Note    Attendees: 2/3    Group Activity: Patients warmed up with chair exercises then participated in a game of balloon \"Keep it in the air\". Notes: At time of group, pt was sleeping in room. D/t lethargy from last group and per nursing pt to continue to rest at this time.       Discipline Responsible: Psychoeducational Specialist    Electronically signed by Mortimer Solid, COTA/L on 2/9/2020 at 4:10 PM

## 2020-02-09 NOTE — PROGRESS NOTES
Psychiatric Progress Note    Vanda Flores  [id number]  02/09/20    CHIEF COMPLAINT: unchanged    HPI: Vanda Flores  is a 59 yo  male who presents for exacerbation of schizophrenia with suicidal ideations and depression severe, currently in withdrawals. Pt noted recent exacarbation of psychosis and mood. Pt noted he currently feels safe and comfortable on the unit. Pt was in agreement with treatment team.  Pt was polite and cordial during the interview process. Pt agreed to start depakote requiring lamicatal to be cut in half due to medicaiton titration. Pt was in agreement.     Pt noted he is doing [place] today. \"  Pt noted he is sleeping \"okay. ..about 6 hours last night. \"  Pt noted his apptetite is getting better. Pt rated his depresssion a \"2, on a scale of zero to ten with ten being the worst and zero being none. Pt rated his anxiety a \"3,\" on the same scale. Pt denied any thoughts to harm himself or anyone else. Pt noted occasional auditory and visiual hallucintations.       Pt denied any hx of TBIs, Hep C or HIV. Pt noted hx of seizures.   No TD noted, AIMS=2    Met with pt and treatment team.  No Known Allergies    Medications Prior to Admission: ziprasidone (GEODON) 60 MG capsule, Take 1 capsule by mouth 2 times daily (with meals)  metoprolol succinate (TOPROL XL) 100 MG extended release tablet, Take 1 tablet by mouth daily  amLODIPine (NORVASC) 10 MG tablet, Take 0.5 tablets by mouth daily  levothyroxine (SYNTHROID) 75 MCG tablet, Take 1 tablet by mouth Daily  melatonin 3 MG TABS tablet, Take 3 mg by mouth daily  VIMPAT 200 MG tablet, TAKE ONE (1) TABLET BY MOUTH TWO TIMES DAILY  DULoxetine (CYMBALTA) 30 MG extended release capsule, Take 1 capsule by mouth daily  lisinopril (PRINIVIL;ZESTRIL) 10 MG tablet, Take 1 tablet by mouth daily  aspirin-dipyridamole (AGGRENOX)  MG per extended release capsule, Take 1 capsule by mouth nightly   lamoTRIgine (LAMICTAL) 100 MG tablet, Take 250 mg by mouth 2 times daily  atorvastatin (LIPITOR) 80 MG tablet, Take 1 tablet by mouth daily  famotidine (PEPCID) 20 MG tablet, Take 20 mg by mouth nightly  acetaminophen (TYLENOL) 500 MG tablet, Take 1,000 mg by mouth every 4 hours as needed for Pain or Fever  metFORMIN (GLUCOPHAGE) 500 MG tablet, Take 2 tablets by mouth 2 times daily (with meals)  loperamide (IMODIUM) 2 MG capsule, Take 2 mg by mouth every 6 hours as needed for Diarrhea     Past Medical History:   Diagnosis Date    Avascular necrosis of bone (HCC)     right hip    CAD (coronary artery disease)     Coronary atherosclerosis     Dementia     Dementia ([address]     Dizziness     cerebelar atrophy    Environmental allergies     Essential hypertension     H/O Doppler ultrasound 07/11/2016    carotid - normal    IBS (irritable bowel syndrome)     Mental disability     Recurrent falls     S/P PTCA (percutaneous transluminal coronary angioplasty) 06/16/2016    Cx OM stent    Seizure disorder (HCC)     Urge incontinence of urine     Wrist joint pain     chronic left        Patient Active Problem List   Diagnosis    Obsessive-compulsive disorder    IBS (irritable bowel syndrome)    Mental disability    Uncontrolled type 2 diabetes mellitus with diabetic polyneuropathy, without long-term current use of insulin ([address]    Coronary artery disease involving native coronary artery of native heart with unstable angina pectoris ([address]    Essential hypertension    Dyslipidemia    Ataxia    Late effects of CVA (cerebrovascular accident)    Chronic idiopathic constipation    Seizure disorder ([address]    Acute metabolic encephalopathy    Peripheral polyneuropathy    Hypoglycemia associated with diabetes ([address]    Dysmetria    Coronary atherosclerosis    Urge incontinence of urine    Dementia ([address]    Dizziness    Recurrent falls    Wrist joint pain    Avascular necrosis of bone ([address]    AMS (altered mental status)    Acute exacerbation of

## 2020-02-09 NOTE — BH NOTE
Group Therapy Note    Date: 2/8/2020  Start Time: 1930  End Time:  2000  Number of Participants: 1      Type of Group: Nursing Education      Notes:  Educated on his safety and waiting for assistance. Status After Intervention:  Unchanged    Participation Level:  Active Listener and Interactive    Participation Quality: Appropriate      Speech:  normal      Thought Process/Content: Logical      Affective Functioning: Flat      Mood: euthymic      Level of consciousness:  Alert      Response to Learning: Able to verbalize current knowledge/experience      Endings: None Reported    Modes of Intervention: Education      Discipline Responsible: Licensed Practical Nurse      Signature:  Bryce Tovar LPN

## 2020-02-09 NOTE — GROUP NOTE
Group Therapy Note    Date: 2/9/2020    Group Start Time: 1310  Group End Time: 1628  Group Topic: Psychoeducation    [address]      Group Therapy Note    Attendees: 3/3    Group Activity: Patients did a warm up activity of checkers and then played self esteem Terry Berliner. Games were played to increase cognition and improve socialization. Notes: Pt stated he wanted to attend group but became lethargic and slept through group. The first ~20 minutes he was easy to arouse and engaged in light conversation. Then fell asleep for the remainder. Assisted nursing to return pt to bed after group.     Status After Intervention:  Unchanged       Participation Level: Minimal to none      Participation Quality: Lethargic      Speech: Normal      Mood: Lethargic      Level of consciousness:  Inattentive      Modes of Intervention: Education, Support, Socialization and Activity      Discipline Responsible: Psychoeducational Specialist    Electronically signed by OANH Brewer on 2/9/2020 at 2:20 PM

## 2020-02-09 NOTE — BH NOTE
Group Therapy Note    Date: 2/8/2020  Start Time: 2000  End Time:  2030  Number of Participants: 1      Type of Group: Wrap Up    Patient's Goal:  Don't know what that is    Notes:  Patient has been watching tv    Status After Intervention:  Unchanged    Participation Level:  Active Listener    Participation Quality: Appropriate      Speech:  normal      Thought Process/Content: Logical      Affective Functioning: Flat      Mood: euthymic      Level of consciousness:  Alert      Response to Learning: Able to verbalize current knowledge/experience      Endings: None Reported    Modes of Intervention: Support      Discipline Responsible: Licensed Practical Nurse      Signature:  Anderson Mukherjee LPN

## 2020-02-10 LAB
ALBUMIN SERPL-MCNC: 4.5 GM/DL (ref 3.4–5)
ALP BLD-CCNC: 52 IU/L (ref 40–129)
ALT SERPL-CCNC: 25 U/L (ref 10–40)
AMMONIA: 32 UMOL/L (ref 16–60)
ANION GAP SERPL CALCULATED.3IONS-SCNC: 18 MMOL/L (ref 4–16)
AST SERPL-CCNC: 21 IU/L (ref 15–37)
BILIRUB SERPL-MCNC: 0.3 MG/DL (ref 0–1)
BUN BLDV-MCNC: 13 MG/DL (ref 6–23)
CALCIUM SERPL-MCNC: 9.9 MG/DL (ref 8.3–10.6)
CHLORIDE BLD-SCNC: 99 MMOL/L (ref 99–110)
CO2: 23 MMOL/L (ref 21–32)
CREAT SERPL-MCNC: 1.1 MG/DL (ref 0.9–1.3)
GFR AFRICAN AMERICAN: >60 ML/MIN/1.73M2
GFR NON-AFRICAN AMERICAN: >60 ML/MIN/1.73M2
GLUCOSE BLD-MCNC: 113 MG/DL (ref 70–99)
GLUCOSE BLD-MCNC: 120 MG/DL (ref 70–99)
GLUCOSE BLD-MCNC: 129 MG/DL (ref 70–99)
GLUCOSE BLD-MCNC: 144 MG/DL (ref 70–99)
GLUCOSE BLD-MCNC: 93 MG/DL (ref 70–99)
POTASSIUM SERPL-SCNC: 4.6 MMOL/L (ref 3.5–5.1)
SODIUM BLD-SCNC: 140 MMOL/L (ref 135–145)
TOTAL PROTEIN: 7.1 GM/DL (ref 6.4–8.2)

## 2020-02-10 PROCEDURE — 82962 GLUCOSE BLOOD TEST: CPT

## 2020-02-10 PROCEDURE — 36415 COLL VENOUS BLD VENIPUNCTURE: CPT

## 2020-02-10 PROCEDURE — 80053 COMPREHEN METABOLIC PANEL: CPT

## 2020-02-10 PROCEDURE — 80165 DIPROPYLACETIC ACID FREE: CPT

## 2020-02-10 PROCEDURE — 6370000000 HC RX 637 (ALT 250 FOR IP): Performed by: HOSPITALIST

## 2020-02-10 PROCEDURE — 6370000000 HC RX 637 (ALT 250 FOR IP): Performed by: INTERNAL MEDICINE

## 2020-02-10 PROCEDURE — 82140 ASSAY OF AMMONIA: CPT

## 2020-02-10 PROCEDURE — 1240000000 HC EMOTIONAL WELLNESS R&B

## 2020-02-10 PROCEDURE — 80164 ASSAY DIPROPYLACETIC ACD TOT: CPT

## 2020-02-10 PROCEDURE — 99233 SBSQ HOSP IP/OBS HIGH 50: CPT | Performed by: NURSE PRACTITIONER

## 2020-02-10 PROCEDURE — 6370000000 HC RX 637 (ALT 250 FOR IP): Performed by: PSYCHIATRY & NEUROLOGY

## 2020-02-10 PROCEDURE — 80175 DRUG SCREEN QUAN LAMOTRIGINE: CPT

## 2020-02-10 RX ORDER — DIVALPROEX SODIUM 500 MG/1
500 TABLET, EXTENDED RELEASE ORAL DAILY
Status: DISCONTINUED | OUTPATIENT
Start: 2020-02-11 | End: 2020-02-16

## 2020-02-10 RX ADMIN — LAMOTRIGINE 125 MG: 100 TABLET ORAL at 08:18

## 2020-02-10 RX ADMIN — ZIPRASIDONE HYDROCHLORIDE 60 MG: 20 CAPSULE ORAL at 08:19

## 2020-02-10 RX ADMIN — MELATONIN TAB 3 MG 3 MG: 3 TAB at 20:17

## 2020-02-10 RX ADMIN — BENZTROPINE MESYLATE 1 MG: 1 TABLET ORAL at 08:19

## 2020-02-10 RX ADMIN — DIVALPROEX SODIUM 1000 MG: 500 TABLET, EXTENDED RELEASE ORAL at 08:18

## 2020-02-10 RX ADMIN — TAMSULOSIN HYDROCHLORIDE 0.4 MG: 0.4 CAPSULE ORAL at 08:18

## 2020-02-10 RX ADMIN — ATORVASTATIN CALCIUM 80 MG: 40 TABLET, FILM COATED ORAL at 08:18

## 2020-02-10 RX ADMIN — LEVOTHYROXINE SODIUM 75 MCG: 0.07 TABLET ORAL at 08:17

## 2020-02-10 RX ADMIN — ZIPRASIDONE HYDROCHLORIDE 60 MG: 20 CAPSULE ORAL at 17:56

## 2020-02-10 RX ADMIN — LACOSAMIDE 200 MG: 100 TABLET, FILM COATED ORAL at 08:19

## 2020-02-10 RX ADMIN — METOPROLOL SUCCINATE 50 MG: 50 TABLET, EXTENDED RELEASE ORAL at 08:19

## 2020-02-10 RX ADMIN — LORAZEPAM 1 MG: 1 TABLET ORAL at 01:38

## 2020-02-10 RX ADMIN — LORAZEPAM 1 MG: 1 TABLET ORAL at 20:17

## 2020-02-10 RX ADMIN — LAMOTRIGINE 125 MG: 100 TABLET ORAL at 20:15

## 2020-02-10 RX ADMIN — BENZTROPINE MESYLATE 1 MG: 1 TABLET ORAL at 20:15

## 2020-02-10 RX ADMIN — LORAZEPAM 1 MG: 1 TABLET ORAL at 08:19

## 2020-02-10 RX ADMIN — LACOSAMIDE 200 MG: 100 TABLET, FILM COATED ORAL at 20:15

## 2020-02-10 RX ADMIN — AMLODIPINE BESYLATE 5 MG: 5 TABLET ORAL at 08:19

## 2020-02-10 RX ADMIN — ASPIRIN AND DIPYRIDAMOLE 1 CAPSULE: 25; 200 CAPSULE, EXTENDED RELEASE ORAL at 20:14

## 2020-02-10 RX ADMIN — LISINOPRIL 10 MG: 10 TABLET ORAL at 08:19

## 2020-02-10 RX ADMIN — DULOXETINE HYDROCHLORIDE 30 MG: 30 CAPSULE, DELAYED RELEASE ORAL at 08:19

## 2020-02-10 ASSESSMENT — PAIN SCALES - GENERAL: PAINLEVEL_OUTOF10: 0

## 2020-02-10 NOTE — GROUP NOTE
Group Therapy Note    Date: 2/10/2020    Group Start Time: 1300  Group End Time: 1330    Number of Participants: 3/3    Type: Exercise/Recreation Group    Group Topic/Objective: Chair Exercises    Notes:  Pt attended group, but did not participate. Pt presented as struggling to process 1 step instructions. Status After Intervention:  Unchanged    Participation Level: None    Participation Quality: Lethargic    Speech:  normal    Thought Process/Content: Pt struggled to process 1 step instructions. Affective Functioning: Flat    Mood: Flat    Level of consciousness:  Drowsy    Response to Learning: Pt unable to demonstrate response to learning at this time.      Endings: None Reported    Modes of Intervention: Activity and Movement    Discipline Responsible: Certified Therapeutic Recreation Specialist    Electronically signed by Tatum Vasquez [address] [place] [address], [place] on 2/10/2020 at 1:42 PM

## 2020-02-10 NOTE — GROUP NOTE
Group Therapy Note    Date: 2/10/2020    Group Start Time: 1100  Group End Time: 0109    Number of Participants: 2/3    Type: Spirituality    Group Topic/Objective: Religous discussion with [employer]. Notes:  Pt attended group, but noted to spend majority of time sleeping. Status After Intervention:  Unchanged    Participation Level: Minimal    Participation Quality: Lethargic    Speech:  pressured    Thought Process/Content: Logical    Affective Functioning: Blunted    Mood: Blunted    Level of consciousness:  Drowsy    Response to Learning: Pt unable to demonstrate response to learning at this time.      Endings: None Reported    Modes of Intervention: Education, Support and Socialization    Discipline Responsible: Certified Therapeutic Recreation Specialist     Electronically signed by Irina Nogueira on 2/10/2020 at 2:29 PM

## 2020-02-10 NOTE — CARE COORDINATION
LSW called patient's caregiver Zohra ([phone]) to update on progress of placement. LSW informed that the 3 facilities that had been requested have all denied and requested to refer to others in [facility] and here in [place]. Zohra gave consent for this. 10:45 AM  LSW called [facility] ([phone]) and left voicemail with Leslee for referral.  LSW awaits call back. LSW then called [place] ([phone]) and left voicemail for [facility] about referral.  LSW awaits this call back as well. 11:00 AM  LSW called [facility] ([phone]) to inquire on bed availability. Mahad Campo informed that they have no open beds. LSW then called [facility] at [facility] ([phone]) and spoke with Kendall Valdivia. They do have open beds. Referral faxed to [facility] at [phone].    12:00 PM  LSW called [facility], [facility] ([phone]) to inquire on bed availability. Spoke with [facility], who stated they have open private rooms on their rehab unit. Referral faxed to [facility], [facility] at [phone].    2:15 PM  LSW received call from Gloria Burt at [facility]. Gloria Burt will be here to onsite patient on Wednesday. The only concerns Gloria Burt saw were one of patient's meds and total assist status. 3:00 PM  LSW received call from [phone] concerning referral.  [phone] Al[address]az will look at patient information and call back with determination.

## 2020-02-10 NOTE — BH NOTE
Bedside nurse reports patient more lethargic, unable to follow simple instructions. Labs ordered: CMP with reflex magnesium, valproic acid level total and free, lamotrigine level and ammonia level.

## 2020-02-10 NOTE — BH NOTE
Patient pleasant and cooperative with assessment, compliant with HS medications. Patient's speech noted to be less slurred at times, staff able to decipher patient's words more accurately. Patient denies SI/HI/AVH, denies pain. Patient observed sitting in wheelchair in day area watching television and eating evening snack, friendly with staff. Patient alerted staff of tiredness, cooperative with staff assistance transferring into bed. Patient initially rested quietly then began calling out from bed and making odd requests inappropriate to time and situation. Staff assisted patient with toileting, offered food and fluids. Patient remained restless and anxious. Given PRN Ativan 1mg po at 0140, patient noted to be resting quietly at [address] and asleep at 0245. Patient currently resting quietly with eyes closed, respirations even and unlabored.

## 2020-02-10 NOTE — PROGRESS NOTES
lamoTRIgine (LAMICTAL) 100 MG tablet, Take 250 mg by mouth 2 times daily  atorvastatin (LIPITOR) 80 MG tablet, Take 1 tablet by mouth daily  famotidine (PEPCID) 20 MG tablet, Take 20 mg by mouth nightly  acetaminophen (TYLENOL) 500 MG tablet, Take 1,000 mg by mouth every 4 hours as needed for Pain or Fever  metFORMIN (GLUCOPHAGE) 500 MG tablet, Take 2 tablets by mouth 2 times daily (with meals)  loperamide (IMODIUM) 2 MG capsule, Take 2 mg by mouth every 6 hours as needed for Diarrhea     Past Medical History:   Diagnosis Date    Avascular necrosis of bone (HCC)     right hip    CAD (coronary artery disease)     Coronary atherosclerosis     Dementia     Dementia ([address]     Dizziness     cerebelar atrophy    Environmental allergies     Essential hypertension     H/O Doppler ultrasound 07/11/2016    carotid - normal    IBS (irritable bowel syndrome)     Mental disability     Recurrent falls     S/P PTCA (percutaneous transluminal coronary angioplasty) 06/16/2016    Cx OM stent    Seizure disorder (HCC)     Urge incontinence of urine     Wrist joint pain     chronic left        Patient Active Problem List   Diagnosis    Obsessive-compulsive disorder    IBS (irritable bowel syndrome)    Mental disability    Uncontrolled type 2 diabetes mellitus with diabetic polyneuropathy, without long-term current use of insulin ([address]    Coronary artery disease involving native coronary artery of native heart with unstable angina pectoris ([address]    Essential hypertension    Dyslipidemia    Ataxia    Late effects of CVA (cerebrovascular accident)    Chronic idiopathic constipation    Seizure disorder ([address]    Acute metabolic encephalopathy    Peripheral polyneuropathy    Hypoglycemia associated with diabetes ([address]    Dysmetria    Coronary atherosclerosis    Urge incontinence of urine    Dementia (HCC)    Dizziness    Recurrent falls    Wrist joint pain    Avascular necrosis of bone ([address]    AMS (altered mental status)    Acute exacerbation of chronic schizophrenia ([facility]    Schizophrenia ([facility]       Review of Systems    OBJECTIVE  Vital Signs:  Vitals:    02/10/20 0846   BP: 129/68   Pulse: 78   Resp: 18   Temp: 97.5 °F (36.4 °C)   SpO2:        Labs:  Recent Results (from the past 48 hour(s))   POCT Glucose    Collection Time: 02/08/20 12:20 PM   Result Value Ref Range    POC Glucose 96 70 - 99 MG/DL   POCT Glucose    Collection Time: 02/08/20  4:52 PM   Result Value Ref Range    POC Glucose 97 70 - 99 MG/DL   POCT Glucose    Collection Time: 02/08/20  7:29 PM   Result Value Ref Range    POC Glucose 168 (H) 70 - 99 MG/DL   POCT Glucose    Collection Time: 02/09/20  5:35 AM   Result Value Ref Range    POC Glucose 86 70 - 99 MG/DL   POCT Glucose    Collection Time: 02/09/20 11:58 AM   Result Value Ref Range    POC Glucose 102 (H) 70 - 99 MG/DL   POCT Glucose    Collection Time: 02/09/20  5:02 PM   Result Value Ref Range    POC Glucose 111 (H) 70 - 99 MG/DL   POCT Glucose    Collection Time: 02/09/20  7:49 PM   Result Value Ref Range    POC Glucose 146 (H) 70 - 99 MG/DL   POCT Glucose    Collection Time: 02/10/20  7:54 AM   Result Value Ref Range    POC Glucose 93 70 - 99 MG/DL       PSYCHIATRIC ASSESSMENT / MENTAL STATUS EXAM:   Vitals: Blood pressure 129/68, pulse 78, temperature 97.5 °F (36.4 °C), temperature source Temporal, resp. rate 18, height 6' 2\" (1.88 m), weight 195 lb (88.5 kg), SpO2 96 %. CONSTITUTIONAL:    Appearance: appears stated age. Alert and oriented to person only. No acute distress. Adequate grooming and hygiene. Fair eye contact. No prominent physical abnormalities. Attitude: Manner is cooperative and pleasant  Motor: No psychomotor agitation, retardation or abnormal movements noted  Speech: slurred; normal rate, volume, tone & amount.   Language: intact understanding and production  Mood: depressed  Affect: depressed, decreased range, non-labile, congruent with mood and content of

## 2020-02-10 NOTE — PROGRESS NOTES
Hospitalist Progress Note       Roderick Paul M.D.  2/10/2020 4:29 PM  Admit Date: 1/31/2020    PCP: Hermilo Sanchez MD     Assessment and Plan:   Schizophrenia  -cogentin  HTN  -CCB ACE, BB  Hypothyroid  -synthroid  DM  -SSI  siezure  -vimpat, lacmital  MRDD  Inpatient stay for encephalopathy - resolved    - decrasee dose of Depakote, if manages to participate more can try to wean back more; medications reviewed, while seeing Dr. Claude Mckeon patient was not on Depakote (he last saw on the 31st of jan, and at that time plan was vimpat+lamictal) otherwise high functioning MRDD and who is now lethargic on exam  - follow up results of Depakote level    Subjective:     No chief complaint on file. F/U:  Interval History:  Offers no follow up   Case discussed with staff    Objective: Intake/Output Summary (Last 24 hours) at 2/10/2020 1629  Last data filed at 2/9/2020 1745  Gross per 24 hour   Intake 240 ml   Output --   Net 240 ml      Vitals:   Vitals:    02/10/20 0846   BP: 129/68   Pulse: 78   Resp: 18   Temp: 97.5 °F (36.4 °C)   SpO2:      Physical Exam:  Gen: patient is heavily sleeping, have tried to wake but is very soundly asleep  Heent: ncat  Lungs: no course sounds  Abd: no ndistended  Ext: spontaneously moves all limbs  Skin: warm+dry  Neuro: is asleep, wont follow directions    Ct Abdomen Pelvis Wo Contrast Additional Contrast? None    Result Date: 1/31/2020  EXAMINATION: CT OF THE ABDOMEN AND PELVIS WITHOUT CONTRAST 1/28/2020 9:04 am TECHNIQUE: CT of the abdomen and pelvis was performed without the administration of intravenous contrast. Multiplanar reformatted images are provided for review. Dose modulation, iterative reconstruction, and/or weight based adjustment of the mA/kV was utilized to reduce the radiation dose to as low as reasonably achievable. COMPARISON: None.  HISTORY: ORDERING SYSTEM PROVIDED HISTORY: AMS TECHNOLOGIST PROVIDED HISTORY: Reason for exam:->AMS Additional Contrast?->None Reason for Exam: ams  MRDD  /// ABD PAIN Acuity: Acute Type of Exam: Initial FINDINGS: Lower Chest: There is a 1.4 x 1.6 cm noncalcified nodule in the right lower lobe which abuts the pleura. There is no basilar pericardial or pleural effusion. Organs: Lack of intravenous contrast limits evaluation of the solid organs and bowel. Liver is normal in morphology. Normal gallbladder. Spleen, adrenal glands, and pancreas are normal.  Kidneys are symmetric in size. There is no renal or ureteral calculi. No hydronephrosis. GI/Bowel: Stomach, small bowel, and colon are nondilated. Normal appendix. Pelvis: Evaluation of the pelvis is degraded by streak artifact from bilateral hip hardware. Urinary bladder is decompressed around a Mayen catheter. Pelvic organs are normal.  No free fluid in the pelvis. Peritoneum/Retroperitoneum: Aortoiliac atherosclerotic calcification. Abdominal aorta is normal in caliber. No free fluid in the abdomen. Bones/Soft Tissues: Multiple remote healed anterolateral left rib fractures. Subacute healing 11th and 12th posterior left rib fractures. Status post bilateral total hip arthroplasty. No acute inflammatory process in the abdomen or pelvis. 1.5 cm noncalcified nodule in the right lower lobe. Noncontrast CT chest is recommended for further evaluation. Ct Head Wo Contrast    Result Date: 1/28/2020  EXAMINATION: CT OF THE HEAD WITHOUT CONTRAST  1/28/2020 9:03 am TECHNIQUE: CT of the head was performed without the administration of intravenous contrast. Dose modulation, iterative reconstruction, and/or weight based adjustment of the mA/kV was utilized to reduce the radiation dose to as low as reasonably achievable. COMPARISON: None. HISTORY: ORDERING SYSTEM PROVIDED HISTORY: ams TECHNOLOGIST PROVIDED HISTORY: Reason for exam:->ams Has a \"code stroke\" or \"stroke alert\" been called? ->No Reason for Exam: ams Acuity: Acute Type of Exam: Initial FINDINGS: signal abnormality is seen. No mass effect or midline shift. No evidence of an acute intracranial hemorrhage. The ventricles and sulci are normal in size and configuration. The sellar/suprasellar regions appear unremarkable. The normal signal voids within the major intracranial vessels appear maintained. ORBITS: The visualized portion of the orbits demonstrate no acute abnormality. SINUSES: The visualized paranasal sinuses and mastoid air cells are well aerated. BONES/SOFT TISSUES: The bone marrow signal intensity appears normal. The soft tissues demonstrate no acute abnormality. 1. No acute intracranial abnormality. 2. Stable cerebellar atrophy.   -    DATA:    CBC No results for input(s): WBC, HGB, HCT, PLT in the last 72 hours. BMP No results for input(s): NA, K, CL, CO2, PHOS, BUN, CREATININE in the last 72 hours. Invalid input(s): CA  LFT'S No results for input(s): AST, ALT, ALB, BILIDIR, BILITOT, ALKPHOS in the last 72 hours. COAG No results for input(s): INR in the last 72 hours. CARDIAC ENZYMES  No results for input(s): CKTOTAL, CKMB, CKMBINDEX, TROPONINI in the last 72 hours.   U/A:    Lab Results   Component Value Date    COLORU YELLOW 01/28/2020    [address] NONE SEEN 01/28/2020    RBCUA <1 01/28/2020    MUCUS OCCASIONAL 01/28/2020    BACTERIA RARE 01/28/2020    CLARITYU HAZY 01/28/2020    SPECGRAV 1.021 01/28/2020    LEUKOCYTESUR NEGATIVE 01/28/2020    BLOODU NEGATIVE 01/28/2020    AMORPHOUS FEW 06/11/2016         Kade Thomson MD  Rounding Hospitalist

## 2020-02-11 LAB
GLUCOSE BLD-MCNC: 100 MG/DL (ref 70–99)
GLUCOSE BLD-MCNC: 121 MG/DL (ref 70–99)
GLUCOSE BLD-MCNC: 124 MG/DL (ref 70–99)
GLUCOSE BLD-MCNC: 147 MG/DL (ref 70–99)

## 2020-02-11 PROCEDURE — 6370000000 HC RX 637 (ALT 250 FOR IP): Performed by: INTERNAL MEDICINE

## 2020-02-11 PROCEDURE — 97535 SELF CARE MNGMENT TRAINING: CPT

## 2020-02-11 PROCEDURE — 82962 GLUCOSE BLOOD TEST: CPT

## 2020-02-11 PROCEDURE — 97530 THERAPEUTIC ACTIVITIES: CPT

## 2020-02-11 PROCEDURE — 99233 SBSQ HOSP IP/OBS HIGH 50: CPT | Performed by: NURSE PRACTITIONER

## 2020-02-11 PROCEDURE — 1240000000 HC EMOTIONAL WELLNESS R&B

## 2020-02-11 PROCEDURE — 6370000000 HC RX 637 (ALT 250 FOR IP): Performed by: NURSE PRACTITIONER

## 2020-02-11 PROCEDURE — 6370000000 HC RX 637 (ALT 250 FOR IP): Performed by: HOSPITALIST

## 2020-02-11 PROCEDURE — 6370000000 HC RX 637 (ALT 250 FOR IP): Performed by: PSYCHIATRY & NEUROLOGY

## 2020-02-11 RX ORDER — LORAZEPAM 0.5 MG/1
0.5 TABLET ORAL 2 TIMES DAILY
Status: DISCONTINUED | OUTPATIENT
Start: 2020-02-11 | End: 2020-02-25

## 2020-02-11 RX ADMIN — LAMOTRIGINE 125 MG: 100 TABLET ORAL at 20:22

## 2020-02-11 RX ADMIN — ZIPRASIDONE HYDROCHLORIDE 60 MG: 20 CAPSULE ORAL at 07:56

## 2020-02-11 RX ADMIN — DIVALPROEX SODIUM 500 MG: 500 TABLET, EXTENDED RELEASE ORAL at 07:56

## 2020-02-11 RX ADMIN — LEVOTHYROXINE SODIUM 75 MCG: 0.07 TABLET ORAL at 06:23

## 2020-02-11 RX ADMIN — ASPIRIN AND DIPYRIDAMOLE 1 CAPSULE: 25; 200 CAPSULE, EXTENDED RELEASE ORAL at 20:23

## 2020-02-11 RX ADMIN — DULOXETINE HYDROCHLORIDE 30 MG: 30 CAPSULE, DELAYED RELEASE ORAL at 07:57

## 2020-02-11 RX ADMIN — LISINOPRIL 10 MG: 10 TABLET ORAL at 07:57

## 2020-02-11 RX ADMIN — MELATONIN TAB 3 MG 3 MG: 3 TAB at 20:22

## 2020-02-11 RX ADMIN — LAMOTRIGINE 125 MG: 100 TABLET ORAL at 07:57

## 2020-02-11 RX ADMIN — ZIPRASIDONE HYDROCHLORIDE 60 MG: 20 CAPSULE ORAL at 17:13

## 2020-02-11 RX ADMIN — AMLODIPINE BESYLATE 5 MG: 5 TABLET ORAL at 07:57

## 2020-02-11 RX ADMIN — ATORVASTATIN CALCIUM 80 MG: 40 TABLET, FILM COATED ORAL at 07:57

## 2020-02-11 RX ADMIN — LACOSAMIDE 200 MG: 100 TABLET, FILM COATED ORAL at 20:22

## 2020-02-11 RX ADMIN — BENZTROPINE MESYLATE 1 MG: 1 TABLET ORAL at 07:57

## 2020-02-11 RX ADMIN — LORAZEPAM 1 MG: 1 TABLET ORAL at 07:57

## 2020-02-11 RX ADMIN — HALOPERIDOL 5 MG: 5 TABLET ORAL at 00:10

## 2020-02-11 RX ADMIN — LORAZEPAM 0.5 MG: 0.5 TABLET ORAL at 20:22

## 2020-02-11 RX ADMIN — BENZTROPINE MESYLATE 1 MG: 1 TABLET ORAL at 20:22

## 2020-02-11 RX ADMIN — LACOSAMIDE 200 MG: 100 TABLET, FILM COATED ORAL at 07:56

## 2020-02-11 RX ADMIN — TAMSULOSIN HYDROCHLORIDE 0.4 MG: 0.4 CAPSULE ORAL at 07:57

## 2020-02-11 RX ADMIN — METOPROLOL SUCCINATE 50 MG: 50 TABLET, EXTENDED RELEASE ORAL at 07:58

## 2020-02-11 NOTE — GROUP NOTE
Group Therapy Note    Date: 2/11/2020    Group Start Time: 0830  Group End Time: 0900    Number of Participants: 4/4    Type: Morning Goals Group/ Community Meeting    Group Topic/Objective: Set Goal For The Day and to review Unit Rules and Regulations. Patient's Goal:  Pt states his goal is \"Some pants and shirts. \"    Notes:  Pt seen for the last ~5 minutes of group d/t working with nursing. Pt reports feeling \"alright. \"  Pt denies having anything to feel grateful for at this time. Pt's responses were at times nonsensical, but could typically be redirected. Pt unable to provide numerical values for items at this time. Depression (0-10): Pt states \"A lot. \"    Anxiety (0-10): Pt states \"A lot. \"    Irritability/Aggitation (0-10): Pt states \"A lot. \"    Status After Intervention:  Unchanged    Participation Level: Interactive    Participation Quality: Sharing    Speech:  normal    Thought Process/Content: Flight of ideas    Affective Functioning: Blunted    Mood: Blunted    Level of consciousness:  Alert and Attentive    Response to Learning: Able to verbalize current knowledge/experience    Endings: None Reported    Modes of Intervention: Education, Support and Socialization    Discipline Responsible: Certified Therapeutic Recreation Specialist    Electronically signed by Sumanth Herzog MA on 2/11/2020 at 9:16 AM

## 2020-02-11 NOTE — PLAN OF CARE
Problem: Falls - Risk of:  Goal: Will remain free from falls  Description  Will remain free from falls  2/11/2020 0159 by Milo Osman RN  Outcome: Ongoing  2/10/2020 1444 by Salazar Sierra LPN  Outcome: Ongoing  Goal: Absence of physical injury  Description  Absence of physical injury  2/11/2020 0159 by Milo Osman RN  Outcome: Ongoing  2/10/2020 1444 by Salazar Sierra LPN  Outcome: Ongoing     Problem: Altered Mood, Psychotic Behavior:  Goal: Able to demonstrate trust by eating, participating in treatment and following staff's direction  Description  Able to demonstrate trust by eating, participating in treatment and following staff's direction  2/11/2020 0159 by Milo Osman RN  Outcome: Ongoing  2/10/2020 1444 by Salazar Sierra LPN  Outcome: Ongoing  Goal: Able to verbalize decrease in frequency and intensity of hallucinations  Description  Able to verbalize decrease in frequency and intensity of hallucinations  2/11/2020 0159 by Milo Osman RN  Outcome: Ongoing  2/10/2020 1444 by Salazar Sierra LPN  Outcome: Ongoing  Goal: Able to verbalize reality based thinking  Description  Able to verbalize reality based thinking  2/11/2020 0159 by Milo Osman RN  Outcome: Ongoing  2/10/2020 1444 by Salazar Sierra LPN  Outcome: Ongoing  Goal: Absence of self-harm  Description  Absence of self-harm  2/11/2020 0159 by Milo Osman RN  Outcome: Ongoing  2/10/2020 1444 by Salazar Sierra LPN  Outcome: Ongoing  Goal: Ability to achieve adequate nutritional intake will improve  Description  Ability to achieve adequate nutritional intake will improve  2/11/2020 0159 by Milo Osman RN  Outcome: Ongoing  2/10/2020 1444 by Salazar Sierra LPN  Outcome: Ongoing  Goal: Ability to interact with others will improve  Description  Ability to interact with others will improve  2/11/2020 0159 by Milo Osman RN  Outcome: Ongoing  2/10/2020 1444 by Salazar Sierra LPN  Outcome: Ongoing  Goal: Compliance with prescribed medication regimen will improve  Description  Compliance with prescribed medication regimen will improve  2/11/2020 0159 by Vin Shipley RN  Outcome: Ongoing  2/10/2020 1444 by Gutierrez Blankenship LPN  Outcome: Ongoing  Goal: Patient specific goal  Description  Patient specific goal  2/11/2020 0159 by Vin Shipley RN  Outcome: Ongoing  2/10/2020 1444 by Gutierrez Blankenship LPN  Outcome: Ongoing     Problem: Risk for Impaired Skin Integrity  Goal: Tissue integrity - skin and mucous membranes  Description  Structural intactness and normal physiological function of skin and  mucous membranes.   2/11/2020 0159 by Vin Shipley RN  Outcome: Ongoing  2/10/2020 1444 by Gutierrez Blankenship LPN  Outcome: Ongoing     Problem: Pain:  Goal: Pain level will decrease  Description  Pain level will decrease  2/11/2020 0159 by Vin Shipley RN  Outcome: Ongoing  2/10/2020 1444 by Gutierrez Blankenship LPN  Outcome: Ongoing  Goal: Control of acute pain  Description  Control of acute pain  2/11/2020 0159 by Vin Shipley RN  Outcome: Ongoing  2/10/2020 1444 by Gutierrez Blankenship LPN  Outcome: Ongoing  Goal: Control of chronic pain  Description  Control of chronic pain  2/11/2020 0159 by Vin Shipley RN  Outcome: Ongoing  2/10/2020 1444 by Gutierrez Blankenship LPN  Outcome: Ongoing

## 2020-02-11 NOTE — PROGRESS NOTES
Pt alert this am, continued to become more drowsy as the day went. Pt showered this am, able to stand and follow direction. Pt denies SI, HI or AVH. Pt able to feed self breakfast but needed fed lunch and dinner. Pt w/o c/o pain during the shift.

## 2020-02-11 NOTE — PROGRESS NOTES
Physical Rehabilitation: OCCUPATIONAL THERAPY     [x] daily progress note       [] discharge       Patient Name:  Hakeem Duffy   :  1955 MRN: [id number]  Room:  [address] [place] [address] Date of Admission: 2020  Rehabilitation Diagnosis:   Schizophrenia ([facility] [F20.9]       Date 2020       Day of ARU Week:  5   Time IN/OUT 1107/1150   Individual Tx Minutes    Group Tx Minutes    Co-Treat Minutes 38   Concurrent Tx Minutes    TOTAL Tx Time Mins 38   Variance Time    Variance Time []   Refusal due to:     []   Medical hold/reason:    []   Illness   []   Off Unit for test/procedure  []   Extra time needed to complete task  []   Therapeutic need  []   Other (specify):   Restrictions Restrictions/Precautions  Restrictions/Precautions: General Precautions, Fall Risk  Required Braces or Orthoses?: No      Communication with other providers: [x]   OK to see per nursing:     [x]   Spoke with team member regarding: PT co-treat     Subjective observations and cognitive status: Pt presents in bed. Pain level/location:    0/10       Location:    Discharge recommendations  Anticipated discharge date:  TBD  Destination: []home alone   []home alone w assist prn [] home w/ family    [] Continuous supervision       [x]SNF            [] Assisted living     [] Other:   Continued therapy: []HHC OT  []OUTPATIENT  OT   [] No Further OT  Equipment needs: TBD   (HIT F2 to transition between stars)    Eating/Feeding:     Extremity Used:        []    R UE []    L UE         Dentures: []   N/A    []    Partial []    Full  Adaptive Equipment Used:      Dressing:  Dependent to don and doff pants during toileting. Bed Mobility: Min A with max verbal cues and increased time to perform task            []   Pt received out of bed       Transfers:    Sit--> Stand: Max A x2 from bed to wheelchair and wheelchair to toilet using Steady.   Stand --> Sit:   Mod-Max A x2  Stand-Pivot:     Other:    Assistive device required for transfer:   KIMMIE Functional Mobility:  Pt unable to ambulated, attempted to stand up from bed and Pt unable to fully stand upright requiring max A x2  Assistance:    Device:   []   Rolling Walker     []   Standard Walker [x]   Wheelchair        []   [employer]       []   Nu Pickler         []   Cardiac Angeli Squire       [x]   Other:   [employer] Tasks: Additional Therapeutic activities/exercises completed this date:     [x]   ADL Training   [x]   Balance/Postural training     [x]   Bed/Transfer Training   []   Endurance Training   []   Neuromuscular Re-ed   []   Nu-step:  Time:        Level:         #Steps:       []   Rebounder:    []  Seated     []  Standing        []   Supine Ther Ex (reps/sets):     []   Seated Ther Ex (reps/sets):     []   Standing Ther Ex (reps/sets):     []   Other:      Comments:      Patient/Caregiver Education and Training:   []   [employer] Equipment Use  [x]   Bed Mobility/Transfer Technique/Safety  []   Energy Conservation Tips  []   Family training  [x]   Postural Awareness  [x]   Safety During Functional Activities  []   Reinforced Patient's Precautions   []   Progress was updated and reviewed in Rehabtracker with patient and/or family this         date. Treatment Plan for Next Session:   Continue current POC    Assessment: Pt demonstrated decreased functional performance capacity during  toileting task today. Pt required max A x2 for toilet transfer using Steady. Pt dependent for toileting today.      Treatment/Activity Tolerance:   [x] Tolerated treatment with no adverse effects    [] Patient limited by fatigue  [] Patient limited by pain   [] Patient limited by medical complications:    [] Adverse reaction to Tx:   [] Significant change in status    Safety:       []  bed alarm set    [x]  chair alarm set    []  Pt refused alarms                []  Telesitter activated      [x]  Gait belt used during tx session      []other:       Number of Minutes/Billable Neuromuscular Re-education, Cognitive Reorientation    Electronically signed by   BUDDY Lawrence/L [id number]  2/11/2020, 2:49 PM

## 2020-02-11 NOTE — PROGRESS NOTES
Psychiatric Progress Note    Julito Bautista  [id number]  02/11/20    CHIEF COMPLAINT: unchanged    HPI: Julito Bautista  is a 61 yo  male who presents for exacerbation of schizophrenia with suicidal ideations and depression severe, currently in withdrawals. Pt noted recent exacerbation of psychosis and mood. Pt noted he currently feels safe and comfortable on the unit. Pt was in agreement with treatment team.  Pt was polite and cordial during the interview process. Pt agreed to start Depakote requiring Lamictal to be cut in half due to medication titration. Pt was in agreement.     Pt noted he is doing [place] today. \"  Pt noted he is sleeping \"okay. \"  Pt noted his appetite is getting better. Pt endorses depression but is not able to rate it on a numeric scale. Pt also endorsed anxiety. Pt denied any thoughts to harm himself or anyone else. Pt noted occasional auditory and visual hallucinations. Expressing delusions about his eyeglasses being broken. CMP and ammonia results reviewed - no concerning findings. Awaiting results of lamotrigine and valproic acid levels. Hospitalist decreased dose of Depakote from 1,000 mg to 500 mg daily. Review of the chart notes patient's responses sometimes nonsensical. He did not notice the spoon in his cereal and accused someone of taking it. When the spoon was put in his hand, he was attempting to eat the cereal with the spoon upside down.     Pt denied any hx of TBIs, Hep C or HIV. Pt noted hx of seizures.   No TD noted, AIMS=2    No Known Allergies    Medications Prior to Admission: ziprasidone (GEODON) 60 MG capsule, Take 1 capsule by mouth 2 times daily (with meals)  metoprolol succinate (TOPROL XL) 100 MG extended release tablet, Take 1 tablet by mouth daily  amLODIPine (NORVASC) 10 MG tablet, Take 0.5 tablets by mouth daily  levothyroxine (SYNTHROID) 75 MCG tablet, Take 1 tablet by mouth Daily  melatonin 3 MG TABS tablet, Take 3 mg by mouth daily  VIMPAT mL/min/1.73m2    Anion Gap 18 (H) 4 - 16   Ammonia    Collection Time: 02/10/20  5:35 PM   Result Value Ref Range    Ammonia 32 16 - 60 UMOL/L   POCT Glucose    Collection Time: 02/10/20  8:15 PM   Result Value Ref Range    POC Glucose 129 (H) 70 - 99 MG/DL   POCT Glucose    Collection Time: 02/11/20  7:37 AM   Result Value Ref Range    POC Glucose 100 (H) 70 - 99 MG/DL       PSYCHIATRIC ASSESSMENT / MENTAL STATUS EXAM:   Vitals: Blood pressure 125/67, pulse 82, temperature 97.3 °F (36.3 °C), temperature source Temporal, resp. rate 18, height 6' 2\" (1.88 m), weight 195 lb (88.5 kg), SpO2 99 %. CONSTITUTIONAL:    Appearance: appears stated age. Alert and oriented to person only. No acute distress. Adequate grooming and hygiene. Fair eye contact. No prominent physical abnormalities. Attitude: Manner is cooperative and pleasant  Motor: No psychomotor agitation, retardation or abnormal movements noted  Speech: slurred; normal rate, volume, tone & amount. Language: intact understanding and production  Mood: depressed  Affect: depressed, decreased range, non-labile, congruent with mood and content of speech  Thought Production: Spontaneous. Thought Form: Noted tangentiality and circumstantiality. with flight of ideas and loosening of associations. Thought Content/Perceptions: No WILLIAM, noted AVH, noted delusion  Insight: questionable  Judgment: questionable  Memory: Immediate, recent, and remote appear intact, though not formally tested. Attention: maintained throughout interview  Fund of knowledge: Average  Gait/Balance: WNL/WNL    IMPRESSION:   No change in diagnosis        PLAN:  Psychiatric management:medication initiation and titration, group and individual therapy, safe and therapeutic environment. Status of problem/condition: Improving  Medical co-morbidities: Management per [facility] group, appreciate assistance  Legal Status:Pending  Disposition:estimated LOS: Pending.   The treatment team reviewed with the patient the diagnosis and treatment recommendations to include the risks, benefits, and side effects of chosen medications. The patient verbalized understanding and agreed with the treatment regimen as outlined above. The patient was encouraged to participate in groups. Medical records, Labs, Diagnotic tests reviewed  q15 min safety checks for safety  Interval History. Review current labs  Continue Depakote ER 1000 mg PO QHS for mood. Continue Lamictal at reduced dose of 150 mg PO BID due to initiation of Depakote ER. Continue current medications  Supportive Therapy Provided  Pt had an opportunity to ask questions and address concerns  Pt encouraged to continue therapy group or individual.  Pt was in agreement with treatment plan. The risks benefits and side effects of medications were discussed with the patient, including alternatives and no treatment.     Electronically signed by JOSE Velazquez CNP on 2/11/2020 at 10:41 AM

## 2020-02-11 NOTE — PROGRESS NOTES
starting/stopping. P left sitting up in main patient area with chair alarm on. Nursing aware. Also spoke to nurse and recommended use of STEDY or thelma lift for transfers in order to protect p and nursing safety. Assessment / Impression:    P with limited attention and impaired command following. P initiates movements but ultimately requires max A for all transitions. P at high risk for falls. Recommend continued skilled PT as p is appropriate in order to maximize functional mobility and safety. Patient's tolerance of treatment:  poor   Adverse Reaction: decreased attention, understanding  Significant change in status and impact:  Progressed transfers  Barriers to improvement:  Weakness, impaired cognition, impaired arousal  Plan for Next Session:    Continue to progress mobility  Time in:  1340  Time out:  1418  Timed treatment minutes:  38  Total treatment time:  45    Previously filed items:  Social/Functional History  Lives With: Other (comment)(Group home)  Type of Home: Facility(group home)  Home Layout: One level  Home Access: Level entry  ADL Assistance: Independent  Homemaking Assistance: Needs assistance  Homemaking Responsibilities: (Meals are brought to Pt at home)  Ambulation Assistance: Independent  Transfer Assistance: Independent  Active : No  Type of occupation: works at [employer]  Additional Comments: Pt unable to provide history. Information obtained per report in chart. Per nurse Pt was living with a roommate at a group home and had meals brought in but was indep with ADLs and he went to work at [employer] every day. Reports Pt had a seizure and since then he hasn't been able to walk or move his legs  Short term goals  Time Frame for Short term goals: 1 week  Short term goal 1: P will perform sit <> stand to LRAD with mod A. Short term goal 2: P will perform bed mobility with mod A. Short term goal 3: P will perform stand step transfer to LRAD with mod A.

## 2020-02-11 NOTE — GROUP NOTE
Group Therapy Note    Date: 2/11/2020    Group Start Time: 1300  Group End Time: 7379  Group Topic: Psychoeducation    [address] [place], [place]        Group Therapy Note    Attendees: 4/4      Notes:  Pts participated in recreational therapy group; Pet Therapy. Volunteer brought pet therapy trained dog on the unit and allowed pts to interact with pts. Purpose was to encourage socialization, improve mood, and introduce coping skill. Pt attended group for approximately 10 minutes. While in group, pt minimally engaged d/t lethargy. Pt required hand over hand assistance to pet the dog.      Status After Intervention:  Unchanged    Participation Level: Minimal    Participation Quality: Lethargic      Speech:  normal      Thought Process/Content: Logical      Affective Functioning: Blunted      Mood: Blunted      Level of consciousness:  Drowsy      Response to Learning: Able to verbalize current knowledge/experience      Endings: None Reported    Modes of Intervention: Socialization, Exploration and Activity      Discipline Responsible: Certified Therapeutic Recreation Specialist      Signature:  Jesika Moran, [address] [place] [address]

## 2020-02-11 NOTE — GROUP NOTE
Group Therapy Note    Date: 2/11/2020    Group Start Time: 1100  Group End Time: 1150  Group Topic: Psychotherapy    TOÑITO Brewster LSW    Group Therapy Note    Attendees: 3/4       Patient's Goal: \"some pants and shirts\"    Notes: Patient participated in group. Open group discussion about coping with stress, planning for stress, and managing stress. Patient left the group about 11:30am.     Status After Intervention:  Unchanged    Participation Level:  Active Listener    Participation Quality: Appropriate    Speech: slurred    Affective Functioning: Flat    Mood: anxious    Level of consciousness:  Alert    Response to Learning: Resistant    Endings: None Reported    Modes of Intervention: Education, Support, Socialization and Problem-solving    Discipline Responsible: /Counselor    Signature: TOÑITO Cheng LSW

## 2020-02-12 LAB
BASOPHILS ABSOLUTE: 0 K/CU MM
BASOPHILS RELATIVE PERCENT: 0.2 % (ref 0–1)
DIFFERENTIAL TYPE: ABNORMAL
EOSINOPHILS ABSOLUTE: 0 K/CU MM
EOSINOPHILS RELATIVE PERCENT: 0.2 % (ref 0–3)
GLUCOSE BLD-MCNC: 105 MG/DL (ref 70–99)
GLUCOSE BLD-MCNC: 110 MG/DL (ref 70–99)
GLUCOSE BLD-MCNC: 124 MG/DL (ref 70–99)
GLUCOSE BLD-MCNC: 129 MG/DL (ref 70–99)
HCT VFR BLD CALC: 45.3 % (ref 42–52)
HEMOGLOBIN: 14.9 GM/DL (ref 13.5–18)
IMMATURE NEUTROPHIL %: 0.3 % (ref 0–0.43)
LAMOTRIGINE LEVEL: 12.5 UG/ML (ref 2.5–15)
LAMOTRIGINE LEVEL: NORMAL UG/ML (ref 2.5–15)
LYMPHOCYTES ABSOLUTE: 1.8 K/CU MM
LYMPHOCYTES RELATIVE PERCENT: 30.9 % (ref 24–44)
MCH RBC QN AUTO: 31 PG (ref 27–31)
MCHC RBC AUTO-ENTMCNC: 32.9 % (ref 32–36)
MCV RBC AUTO: 94.2 FL (ref 78–100)
MONOCYTES ABSOLUTE: 0.5 K/CU MM
MONOCYTES RELATIVE PERCENT: 8.9 % (ref 0–4)
PDW BLD-RTO: 12.2 % (ref 11.7–14.9)
PLATELET # BLD: 332 K/CU MM (ref 140–440)
PMV BLD AUTO: 9.7 FL (ref 7.5–11.1)
RBC # BLD: 4.81 M/CU MM (ref 4.6–6.2)
SEGMENTED NEUTROPHILS ABSOLUTE COUNT: 3.5 K/CU MM
SEGMENTED NEUTROPHILS RELATIVE PERCENT: 59.5 % (ref 36–66)
TOTAL IMMATURE NEUTOROPHIL: 0.02 K/CU MM
TOTAL RETICULOCYTE COUNT: 0.05 K/CU MM
WBC # BLD: 5.9 K/CU MM (ref 4–10.5)

## 2020-02-12 PROCEDURE — 1240000000 HC EMOTIONAL WELLNESS R&B

## 2020-02-12 PROCEDURE — 85025 COMPLETE CBC W/AUTO DIFF WBC: CPT

## 2020-02-12 PROCEDURE — 6370000000 HC RX 637 (ALT 250 FOR IP): Performed by: NURSE PRACTITIONER

## 2020-02-12 PROCEDURE — 99233 SBSQ HOSP IP/OBS HIGH 50: CPT | Performed by: NURSE PRACTITIONER

## 2020-02-12 PROCEDURE — 36415 COLL VENOUS BLD VENIPUNCTURE: CPT

## 2020-02-12 PROCEDURE — 82962 GLUCOSE BLOOD TEST: CPT

## 2020-02-12 PROCEDURE — 6370000000 HC RX 637 (ALT 250 FOR IP): Performed by: HOSPITALIST

## 2020-02-12 PROCEDURE — 6370000000 HC RX 637 (ALT 250 FOR IP): Performed by: INTERNAL MEDICINE

## 2020-02-12 PROCEDURE — 6370000000 HC RX 637 (ALT 250 FOR IP): Performed by: PSYCHIATRY & NEUROLOGY

## 2020-02-12 RX ADMIN — LORAZEPAM 0.5 MG: 0.5 TABLET ORAL at 20:19

## 2020-02-12 RX ADMIN — LAMOTRIGINE 125 MG: 100 TABLET ORAL at 09:51

## 2020-02-12 RX ADMIN — LACOSAMIDE 200 MG: 100 TABLET, FILM COATED ORAL at 20:18

## 2020-02-12 RX ADMIN — ATORVASTATIN CALCIUM 80 MG: 40 TABLET, FILM COATED ORAL at 09:50

## 2020-02-12 RX ADMIN — BENZTROPINE MESYLATE 1 MG: 1 TABLET ORAL at 09:52

## 2020-02-12 RX ADMIN — LISINOPRIL 10 MG: 10 TABLET ORAL at 09:53

## 2020-02-12 RX ADMIN — LACOSAMIDE 200 MG: 100 TABLET, FILM COATED ORAL at 09:52

## 2020-02-12 RX ADMIN — ZIPRASIDONE HYDROCHLORIDE 60 MG: 20 CAPSULE ORAL at 18:17

## 2020-02-12 RX ADMIN — LAMOTRIGINE 125 MG: 100 TABLET ORAL at 20:18

## 2020-02-12 RX ADMIN — METOPROLOL SUCCINATE 50 MG: 50 TABLET, EXTENDED RELEASE ORAL at 09:51

## 2020-02-12 RX ADMIN — LORAZEPAM 0.5 MG: 0.5 TABLET ORAL at 09:52

## 2020-02-12 RX ADMIN — BENZTROPINE MESYLATE 1 MG: 1 TABLET ORAL at 20:19

## 2020-02-12 RX ADMIN — LEVOTHYROXINE SODIUM 75 MCG: 0.07 TABLET ORAL at 05:38

## 2020-02-12 RX ADMIN — ZIPRASIDONE HYDROCHLORIDE 60 MG: 20 CAPSULE ORAL at 09:52

## 2020-02-12 RX ADMIN — DIVALPROEX SODIUM 500 MG: 500 TABLET, EXTENDED RELEASE ORAL at 09:50

## 2020-02-12 RX ADMIN — AMLODIPINE BESYLATE 5 MG: 5 TABLET ORAL at 09:50

## 2020-02-12 RX ADMIN — DULOXETINE HYDROCHLORIDE 30 MG: 30 CAPSULE, DELAYED RELEASE ORAL at 09:53

## 2020-02-12 RX ADMIN — ASPIRIN AND DIPYRIDAMOLE 1 CAPSULE: 25; 200 CAPSULE, EXTENDED RELEASE ORAL at 20:18

## 2020-02-12 RX ADMIN — MELATONIN TAB 3 MG 3 MG: 3 TAB at 20:19

## 2020-02-12 RX ADMIN — TAMSULOSIN HYDROCHLORIDE 0.4 MG: 0.4 CAPSULE ORAL at 09:53

## 2020-02-12 ASSESSMENT — PAIN SCALES - GENERAL
PAINLEVEL_OUTOF10: 0
PAINLEVEL_OUTOF10: 0

## 2020-02-12 NOTE — GROUP NOTE
Group Therapy Note    Date: 2/12/2020    Group Start Time: 1500  Group End Time: 5747  Group Topic: Recreational    [address] [place], [place]        Group Therapy Note    Attendees: 1/5       Notes:  Pts participated in recreational therapy group; Ratna Armendariz. Pts and therapist reviewed what is mindfulness and then practiced utilizing zendoodles as a mindfulness activity. Discussion centered on how the activity made them feel. Pt sleeping.  Per discussion with nursing, pt allowed to continue to rest.       Discipline Responsible: Certified Therapeutic Recreation Specialist       Signature:  Jesika Moran, [address] [place] [address]

## 2020-02-12 NOTE — GROUP NOTE
Group Therapy Note    Date: 2/12/2020    Group Start Time: 1330  Group End Time: 1400  Group Topic: Healthy Living/Wellness    [address]    LUCY Dc        Group Therapy Note    Attendees: 4/5       Notes:  Pts participated in recreational therapy group; Self-Care Discussion. Pts and therapist reviewed a handout that discussed ways to increase positive self-care. These tips included sleep hygiene, healthy eating, importance of exercise, taking time for themselves each day, speaking up for themselves, and the benefits of leisure. Discussion centered around how each self-care tip can help with their mental health . Pt attended group, but minimally engaged. Pt was able to state how he recognizes that he knows someone who \"gets angry a lot\" because he doesn't take time for himself.      Status After Intervention:  Improved    Participation Level: Minimal    Participation Quality: Appropriate and Lethargic      Speech:  pressured      Thought Process/Content: Logical      Affective Functioning: Blunted      Mood: Blunted      Level of consciousness:  Drowsy      Response to Learning: Able to verbalize current knowledge/experience      Endings: None Reported    Modes of Intervention: Education, Support and Socialization      Discipline Responsible: Certified Therapeutic Recreation Specialist       Signature:  Nathaniel Johnson

## 2020-02-12 NOTE — PROGRESS NOTES
Pt requested that ring be placed in his locker for safe keeping. Therapist placed in biohazard bag, labeled the bag, and then placed it in his locker.      Electronically signed by LUCY Vasquez MA on 2/12/2020 at 2:09 PM

## 2020-02-12 NOTE — PROGRESS NOTES
Psychiatric Progress Note    Bandar Weinstein  [id number]  02/12/20    CHIEF COMPLAINT: unchanged    HPI: Bandar Weinstein  is a 59 yo [facility] [place] male who presents for exacerbation of schizophrenia with suicidal ideation and depression severe. Pt noted recent exacerbation of psychosis and mood. Pt noted he currently feels safe and comfortable on the unit. Pt was in agreement with treatment team.  Pt was polite and cordial during the interview process. Pt agreed to start Depakote requiring Lamictal to be cut in half due to medication titration. Pt was in agreement.     Pt noted he is doing [place] today. \"  Pt noted he is sleeping \"okay. \"  Pt noted his appetite is \"alright. \"  Pt endorses depression but is not able to rate it on a numeric scale. Pt denied anxiety. Pt denied any thoughts to harm himself or anyone else. Pt noted occasional auditory and visual hallucinations. Bedside RN noted attention seeking behavior - throwing his ring repeatedly on the floor, e.g.    CMP and ammonia results reviewed - no concerning findings. Awaiting results of lamotrigine and valproic acid levels. Hospitalist decreased dose of Depakote from 1,000 mg to 500 mg daily.     No TD noted, AIMS=2    Allergies   Allergen Reactions    Zyprexa [Olanzapine] Other (See Comments)     Developed severe tardive dyskinesia       Medications Prior to Admission: ziprasidone (GEODON) 60 MG capsule, Take 1 capsule by mouth 2 times daily (with meals)  metoprolol succinate (TOPROL XL) 100 MG extended release tablet, Take 1 tablet by mouth daily  amLODIPine (NORVASC) 10 MG tablet, Take 0.5 tablets by mouth daily  levothyroxine (SYNTHROID) 75 MCG tablet, Take 1 tablet by mouth Daily  melatonin 3 MG TABS tablet, Take 3 mg by mouth daily  VIMPAT 200 MG tablet, TAKE ONE (1) TABLET BY MOUTH TWO TIMES DAILY  DULoxetine (CYMBALTA) 30 MG extended release capsule, Take 1 capsule by mouth daily  lisinopril (PRINIVIL;ZESTRIL) 10 MG tablet, Take 1 tablet by mouth Ref Range    POC Glucose 147 (H) 70 - 99 MG/DL   CBC auto differential    Collection Time: 02/12/20  6:00 AM   Result Value Ref Range    WBC 5.9 4.0 - 10.5 K/CU MM    RBC 4.81 4.6 - 6.2 M/CU MM    Hemoglobin 14.9 13.5 - 18.0 GM/DL    Hematocrit 45.3 42 - 52 %    MCV 94.2 78 - 100 FL    MCH 31.0 27 - 31 PG    MCHC 32.9 32.0 - 36.0 %    RDW 12.2 11.7 - 14.9 %    Platelets 095 509 - 219 K/CU MM    MPV 9.7 7.5 - 11.1 FL    Differential Type AUTOMATED DIFFERENTIAL     Segs Relative 59.5 36 - 66 %    Lymphocytes % 30.9 24 - 44 %    Monocytes % 8.9 (H) 0 - 4 %    Eosinophils % 0.2 0 - 3 %    Basophils % 0.2 0 - 1 %    Segs Absolute 3.5 K/CU MM    Lymphocytes Absolute 1.8 K/CU MM    Monocytes Absolute 0.5 K/CU MM    Eosinophils Absolute 0.0 K/CU MM    Basophils Absolute 0.0 K/CU MM    TRC 0.0462 K/CU MM    Immature Neutrophil % 0.3 0 - 0.43 %    Total Immature Neutrophil 0.02 K/CU MM   POCT Glucose    Collection Time: 02/12/20  7:29 AM   Result Value Ref Range    POC Glucose 129 (H) 70 - 99 MG/DL       PSYCHIATRIC ASSESSMENT / MENTAL STATUS EXAM:   Vitals: Blood pressure 110/65, pulse 81, temperature 97.5 °F (36.4 °C), temperature source Temporal, resp. rate 16, height 6' 2\" (1.88 m), weight 195 lb (88.5 kg), SpO2 96 %. CONSTITUTIONAL:    Appearance: appears stated age. Alert and oriented to person only. No acute distress. Adequate grooming and hygiene. Fair eye contact. No prominent physical abnormalities. Attitude: Manner is cooperative and pleasant  Motor: No psychomotor agitation, retardation or abnormal movements noted  Speech: slurred; normal rate, volume, tone & amount. Language: intact understanding and production  Mood: depressed  Affect: depressed but improved, decreased range, non-labile, congruent with mood and content of speech  Thought Production: Spontaneous. Thought Form: Noted tangentiality and circumstantiality with flight of ideas and loosening of associations.   Thought Content/Perceptions: No WILLIAM, noted AVH, noted delusion  Insight: questionable  Judgment: questionable  Memory: Immediate, recent, and remote appear intact, though not formally tested. Attention: maintained throughout interview  Fund of knowledge: Average  Gait/Balance: WNL/WNL    IMPRESSION:   No change in diagnosis        PLAN:  Psychiatric management:medication initiation and titration, group and individual therapy, safe and therapeutic environment. Status of problem/condition: Improving  Medical co-morbidities: Management per [facility] group, appreciate assistance  Legal Status:Pending  Disposition:estimated LOS: Pending. The treatment team reviewed with the patient the diagnosis and treatment recommendations to include the risks, benefits, and side effects of chosen medications. The patient verbalized understanding and agreed with the treatment regimen as outlined above. The patient was encouraged to participate in groups. Medical records, Labs, Diagnotic tests reviewed  q15 min safety checks for safety  Interval History. Review current labs  Continue Depakote ER 1000 mg PO QHS for mood. Continue Lamictal at reduced dose of 150 mg PO BID due to initiation of Depakote ER. Continue current medications  Supportive Therapy Provided  Pt had an opportunity to ask questions and address concerns  Pt encouraged to continue therapy group or individual.  Pt was in agreement with treatment plan. The risks benefits and side effects of medications were discussed with the patient, including alternatives and no treatment.     Electronically signed by JOSE Dean CNP on 2/12/2020 at 10:59 AM

## 2020-02-12 NOTE — PROGRESS NOTES
Pt in bed putting call light on several times, no needs voiced when responded. Given snack, drink and repositioned. Reminded pt it was late at night and not time for meals. Pt laughed and said \"ok\". Bed alarm on.

## 2020-02-12 NOTE — CARE COORDINATION
LSW received notification that [facility] has declined patient. LSW will continue to seek placement. 10:45 AM  LSW received call from Emily Britt at [facility]. Due to resident isolation in building, they are unable to accept patient. 11:15 AM  LSW called [facility] ([phone]) to check on bed availability. [facility] stated they have an open bed at [facility]. Clinicals sent to [facility] and LSW awaits call back. PASRR completed online and supporting documentation faxed to [facility] at [phone].

## 2020-02-12 NOTE — PLAN OF CARE
Problem: Falls - Risk of:  Goal: Will remain free from falls  Description  Will remain free from falls  Outcome: Ongoing  Goal: Absence of physical injury  Description  Absence of physical injury  Outcome: Ongoing     Problem: Altered Mood, Psychotic Behavior:  Goal: Able to demonstrate trust by eating, participating in treatment and following staff's direction  Description  Able to demonstrate trust by eating, participating in treatment and following staff's direction  Outcome: Ongoing  Goal: Able to verbalize decrease in frequency and intensity of hallucinations  Description  Able to verbalize decrease in frequency and intensity of hallucinations  Outcome: Ongoing  Goal: Able to verbalize reality based thinking  Description  Able to verbalize reality based thinking  Outcome: Ongoing  Goal: Absence of self-harm  Description  Absence of self-harm  Outcome: Ongoing  Goal: Ability to achieve adequate nutritional intake will improve  Description  Ability to achieve adequate nutritional intake will improve  Outcome: Ongoing  Goal: Ability to interact with others will improve  Description  Ability to interact with others will improve  Outcome: Ongoing  Goal: Compliance with prescribed medication regimen will improve  Description  Compliance with prescribed medication regimen will improve  Outcome: Ongoing  Goal: Patient specific goal  Description  Patient specific goal  Outcome: Ongoing     Problem: Risk for Impaired Skin Integrity  Goal: Tissue integrity - skin and mucous membranes  Description  Structural intactness and normal physiological function of skin and  mucous membranes. Outcome: Ongoing     Problem: Pain:  Description  Pain management should include both nonpharmacologic and pharmacologic interventions.   Goal: Pain level will decrease  Description  Pain level will decrease  Outcome: Ongoing  Goal: Control of acute pain  Description  Control of acute pain  Outcome: Ongoing  Goal: Control of chronic

## 2020-02-12 NOTE — PLAN OF CARE
Problem: Falls - Risk of:  Goal: Will remain free from falls  Description  Will remain free from falls  Outcome: Ongoing  Goal: Absence of physical injury  Description  Absence of physical injury  Outcome: Ongoing     Problem: Altered Mood, Psychotic Behavior:  Goal: Able to demonstrate trust by eating, participating in treatment and following staff's direction  Description  Able to demonstrate trust by eating, participating in treatment and following staff's direction  Outcome: Ongoing  Goal: Able to verbalize decrease in frequency and intensity of hallucinations  Description  Able to verbalize decrease in frequency and intensity of hallucinations  Outcome: Ongoing  Goal: Able to verbalize reality based thinking  Description  Able to verbalize reality based thinking  Outcome: Ongoing  Goal: Absence of self-harm  Description  Absence of self-harm  Outcome: Ongoing  Goal: Ability to achieve adequate nutritional intake will improve  Description  Ability to achieve adequate nutritional intake will improve  Outcome: Ongoing  Goal: Ability to interact with others will improve  Description  Ability to interact with others will improve  Outcome: Ongoing  Goal: Compliance with prescribed medication regimen will improve  Description  Compliance with prescribed medication regimen will improve  Outcome: Ongoing  Goal: Patient specific goal  Description  Patient specific goal  Outcome: Ongoing     Problem: Risk for Impaired Skin Integrity  Goal: Tissue integrity - skin and mucous membranes  Description  Structural intactness and normal physiological function of skin and  mucous membranes.   Outcome: Ongoing     Problem: Pain:  Goal: Pain level will decrease  Description  Pain level will decrease  Outcome: Ongoing  Goal: Control of acute pain  Description  Control of acute pain  Outcome: Ongoing  Goal: Control of chronic pain  Description  Control of chronic pain  Outcome: Ongoing

## 2020-02-13 LAB
GLUCOSE BLD-MCNC: 100 MG/DL (ref 70–99)
GLUCOSE BLD-MCNC: 118 MG/DL (ref 70–99)
GLUCOSE BLD-MCNC: 122 MG/DL (ref 70–99)
GLUCOSE BLD-MCNC: 152 MG/DL (ref 70–99)
VALPROIC ACID % FREE: 22 % (ref 5–18)
VALPROIC ACID % FREE: ABNORMAL % (ref 5–18)
VALPROIC ACID TOTAL: 108 UG/ML (ref 50–125)
VALPROIC ACID, FREE: 24 UG/ML (ref 7–23)

## 2020-02-13 PROCEDURE — 97530 THERAPEUTIC ACTIVITIES: CPT

## 2020-02-13 PROCEDURE — 99233 SBSQ HOSP IP/OBS HIGH 50: CPT | Performed by: NURSE PRACTITIONER

## 2020-02-13 PROCEDURE — 6370000000 HC RX 637 (ALT 250 FOR IP): Performed by: NURSE PRACTITIONER

## 2020-02-13 PROCEDURE — 6370000000 HC RX 637 (ALT 250 FOR IP): Performed by: PSYCHIATRY & NEUROLOGY

## 2020-02-13 PROCEDURE — 1240000000 HC EMOTIONAL WELLNESS R&B

## 2020-02-13 PROCEDURE — 6370000000 HC RX 637 (ALT 250 FOR IP): Performed by: HOSPITALIST

## 2020-02-13 PROCEDURE — 82962 GLUCOSE BLOOD TEST: CPT

## 2020-02-13 PROCEDURE — 6370000000 HC RX 637 (ALT 250 FOR IP): Performed by: INTERNAL MEDICINE

## 2020-02-13 RX ORDER — DULOXETIN HYDROCHLORIDE 30 MG/1
30 CAPSULE, DELAYED RELEASE ORAL ONCE
Status: COMPLETED | OUTPATIENT
Start: 2020-02-13 | End: 2020-02-13

## 2020-02-13 RX ORDER — ERGOCALCIFEROL 1.25 MG/1
50000 CAPSULE ORAL WEEKLY
Status: DISCONTINUED | OUTPATIENT
Start: 2020-02-13 | End: 2020-02-25 | Stop reason: HOSPADM

## 2020-02-13 RX ORDER — DULOXETIN HYDROCHLORIDE 30 MG/1
60 CAPSULE, DELAYED RELEASE ORAL DAILY
Status: DISCONTINUED | OUTPATIENT
Start: 2020-02-14 | End: 2020-02-18

## 2020-02-13 RX ADMIN — LAMOTRIGINE 125 MG: 100 TABLET ORAL at 08:52

## 2020-02-13 RX ADMIN — LACOSAMIDE 200 MG: 100 TABLET, FILM COATED ORAL at 20:38

## 2020-02-13 RX ADMIN — BENZTROPINE MESYLATE 1 MG: 1 TABLET ORAL at 20:39

## 2020-02-13 RX ADMIN — LAMOTRIGINE 125 MG: 100 TABLET ORAL at 20:38

## 2020-02-13 RX ADMIN — LACOSAMIDE 200 MG: 100 TABLET, FILM COATED ORAL at 08:52

## 2020-02-13 RX ADMIN — DIVALPROEX SODIUM 500 MG: 500 TABLET, EXTENDED RELEASE ORAL at 08:52

## 2020-02-13 RX ADMIN — BENZTROPINE MESYLATE 1 MG: 1 TABLET ORAL at 08:52

## 2020-02-13 RX ADMIN — LISINOPRIL 10 MG: 10 TABLET ORAL at 08:53

## 2020-02-13 RX ADMIN — LEVOTHYROXINE SODIUM 75 MCG: 0.07 TABLET ORAL at 05:11

## 2020-02-13 RX ADMIN — ERGOCALCIFEROL 50000 UNITS: 1.25 CAPSULE ORAL at 20:53

## 2020-02-13 RX ADMIN — TAMSULOSIN HYDROCHLORIDE 0.4 MG: 0.4 CAPSULE ORAL at 08:53

## 2020-02-13 RX ADMIN — ASPIRIN AND DIPYRIDAMOLE 1 CAPSULE: 25; 200 CAPSULE, EXTENDED RELEASE ORAL at 20:39

## 2020-02-13 RX ADMIN — ATORVASTATIN CALCIUM 80 MG: 40 TABLET, FILM COATED ORAL at 08:52

## 2020-02-13 RX ADMIN — LORAZEPAM 0.5 MG: 0.5 TABLET ORAL at 08:53

## 2020-02-13 RX ADMIN — MELATONIN TAB 3 MG 3 MG: 3 TAB at 20:39

## 2020-02-13 RX ADMIN — METOPROLOL SUCCINATE 50 MG: 50 TABLET, EXTENDED RELEASE ORAL at 08:52

## 2020-02-13 RX ADMIN — AMLODIPINE BESYLATE 5 MG: 5 TABLET ORAL at 08:53

## 2020-02-13 RX ADMIN — LORAZEPAM 0.5 MG: 0.5 TABLET ORAL at 20:39

## 2020-02-13 RX ADMIN — DULOXETINE HYDROCHLORIDE 30 MG: 30 CAPSULE, DELAYED RELEASE ORAL at 16:44

## 2020-02-13 RX ADMIN — ZIPRASIDONE HYDROCHLORIDE 60 MG: 20 CAPSULE ORAL at 08:53

## 2020-02-13 RX ADMIN — ZIPRASIDONE HYDROCHLORIDE 60 MG: 20 CAPSULE ORAL at 16:44

## 2020-02-13 RX ADMIN — DULOXETINE HYDROCHLORIDE 30 MG: 30 CAPSULE, DELAYED RELEASE ORAL at 08:53

## 2020-02-13 ASSESSMENT — PAIN SCALES - GENERAL: PAINLEVEL_OUTOF10: 0

## 2020-02-13 NOTE — CARE COORDINATION
LSW received call from [facility] with [facility]. Melissa informed that they could no longer take patient due to quarantine for a massive flu outbreak. Melissa stated that they would be happy to accept at either [facility] or [facility]. LSW will call patient's caregiver to see if either option will work. 12:45 PM  Brenda from [facility] here to assess patient. 2:15 PM  LSW called patient's   47-7 ([phone]). Zohra stated she was not ok with [facility], but to get the official ruling from patient's DD LESLY Malagon. LSW then called Ruth Hutchinson ([phone]) and voicemail left requesting call back.

## 2020-02-13 NOTE — PROGRESS NOTES
left        Patient Active Problem List   Diagnosis    Obsessive-compulsive disorder    IBS (irritable bowel syndrome)    Mental disability    Uncontrolled type 2 diabetes mellitus with diabetic polyneuropathy, without long-term current use of insulin ([address]    Coronary artery disease involving native coronary artery of native heart with unstable angina pectoris ([address]    Essential hypertension    Dyslipidemia    Ataxia    Late effects of CVA (cerebrovascular accident)    Chronic idiopathic constipation    Seizure disorder (HCC)    Acute metabolic encephalopathy    Peripheral polyneuropathy    Hypoglycemia associated with diabetes ([address]    Dysmetria    Coronary atherosclerosis    Urge incontinence of urine    Dementia ([address]    Dizziness    Recurrent falls    Wrist joint pain    Avascular necrosis of bone ([address]    AMS (altered mental status)    Acute exacerbation of chronic schizophrenia ([address]    Schizophrenia ([address]       Review of Systems    OBJECTIVE  Vital Signs:  Vitals:    02/13/20 0915   BP: 112/65   Pulse: 84   Resp: 16   Temp: 97.4 °F (36.3 °C)   SpO2: 99%       Labs:  Recent Results (from the past 48 hour(s))   POCT Glucose    Collection Time: 02/11/20 11:52 AM   Result Value Ref Range    POC Glucose 121 (H) 70 - 99 MG/DL   POCT Glucose    Collection Time: 02/11/20  5:01 PM   Result Value Ref Range    POC Glucose 124 (H) 70 - 99 MG/DL   POCT Glucose    Collection Time: 02/11/20  8:21 PM   Result Value Ref Range    POC Glucose 147 (H) 70 - 99 MG/DL   CBC auto differential    Collection Time: 02/12/20  6:00 AM   Result Value Ref Range    WBC 5.9 4.0 - 10.5 K/CU MM    RBC 4.81 4.6 - 6.2 M/CU MM    Hemoglobin 14.9 13.5 - 18.0 GM/DL    Hematocrit 45.3 42 - 52 %    MCV 94.2 78 - 100 FL    MCH 31.0 27 - 31 PG    MCHC 32.9 32.0 - 36.0 %    RDW 12.2 11.7 - 14.9 %    Platelets 916 183 - 118 K/CU MM    MPV 9.7 7.5 - 11.1 FL    Differential Type AUTOMATED DIFFERENTIAL     Segs Relative 59.5 36 -

## 2020-02-13 NOTE — PROGRESS NOTES
requires simple repetition for activities. P with significant impairments in coordination, balance, mobility and overall significant functional decline. Recommend skilled PT to address deficits and maximize functional potential.   Patient's tolerance of treatment:  good   Adverse Reaction: none  Significant change in status and impact:  Improved standing  Barriers to improvement:  Impaired ability to follow directions, impaired coordination  Plan for Next Session:    Continue working on standing and balance  Time in:  1505  Time out:  1540  Timed treatment minutes:  35  Total treatment time:  35    Previously filed items:  Social/Functional History  Lives With: Other (comment)(Group home)  Type of Home: Facility(group home)  Home Layout: One level  Home Access: Level entry  ADL Assistance: Independent  Homemaking Assistance: Needs assistance  Homemaking Responsibilities: (Meals are brought to Pt at home)  Ambulation Assistance: Independent  Transfer Assistance: Independent  Active : No  Type of occupation: works at [employer]  Additional Comments: Pt unable to provide history. Information obtained per report in chart. Per nurse Pt was living with a roommate at a group home and had meals brought in but was indep with ADLs and he went to work at [employer] every day. Reports Pt had a seizure and since then he hasn't been able to walk or move his legs  Short term goals  Time Frame for Short term goals: 1 week  Short term goal 1: P will perform sit <> stand to LRAD with mod A. Short term goal 2: P will perform bed mobility with mod A. Short term goal 3: P will perform stand step transfer to LRAD with mod A.        Electronically signed by:    Mone Mayes, PT  2/13/2020, 3:56 PM

## 2020-02-13 NOTE — PROGRESS NOTES
Physical Rehabilitation: OCCUPATIONAL THERAPY     [x] daily progress note       [] discharge       Patient Name:  Suzi Aburto   :  1955 MRN: [id number]  Room:  [address] [place] [address] Date of Admission: 2020  Rehabilitation Diagnosis:   Schizophrenia ([facility] [F20.9]       Date 2020       Day of ARU Week:  7   Time IN/OUT 1505/1530   Individual Tx Minutes    Group Tx Minutes    Co-Treat Minutes 25   Concurrent Tx Minutes    TOTAL Tx Time Mins 25   Variance Time    Variance Time []   Refusal due to:     []   Medical hold/reason:    []   Illness   []   Off Unit for test/procedure  []   Extra time needed to complete task  []   Therapeutic need  []   Other (specify):   Restrictions Restrictions/Precautions  Restrictions/Precautions: General Precautions, Fall Risk  Required Braces or Orthoses?: No      Communication with other providers: [x]   OK to see per nursing:     [x]   Spoke with team member regarding: PT co-treat     Subjective observations and cognitive status: Pt presents in bed. Pain level/location:    0/10       Location:    Discharge recommendations  Anticipated discharge date:  TBD  Destination: []home alone   []home alone w assist prn [] home w/ family    [] Continuous supervision       [x]SNF            [] Assisted living     [] Other:   Continued therapy: []HHC OT  []OUTPATIENT  OT   [] No Further OT  Equipment needs: TBD   (HIT F2 to transition between stars)    Eating/Feeding:     Extremity Used:        []    R UE []    L UE         Dentures: []   N/A    []    Partial []    Full  Adaptive Equipment Used:      Dressing:  x      Bed Mobility: Min A with max verbal cues and increased time to perform task            []   Pt received out of bed       Transfers:    Sit--> Stand: Mod A x2 from w/c to walker but very unsteady and ataxic. Min A x2 pulling self up with Stedy  Stand --> Sit:   Min A-Mod A    Other:    Assistive device required for transfer:   RW       Functional Mobility:  Pt tried to ambulate with requiring max A x2, backwards lean and ataxic  Assistance:    Device:   []   Oneta Call     []   Standard Walker [x]   Wheelchair        []   [employer]       []   Jose Carlos Page         []   Cardiac Walker       [x]   Other:   [employer] Tasks: Additional Therapeutic activities/exercises completed this date:     [x]   ADL Training   [x]   Balance/Postural training     [x]   Bed/Transfer Training   []   Endurance Training   []   Neuromuscular Re-ed   []   Nu-step:  Time:        Level:         #Steps:       []   Rebounder:    []  Seated     []  Standing        []   Supine Ther Ex (reps/sets):     []   Seated Ther Ex (reps/sets):     []   Standing Ther Ex (reps/sets):     []   Other:      Comments:      Patient/Caregiver Education and Training:   []   [employer] Equipment Use  [x]   Bed Mobility/Transfer Technique/Safety  []   Energy Conservation Tips  []   Family training  [x]   Postural Awareness  [x]   Safety During Functional Activities  []   Reinforced Patient's Precautions   []   Progress was updated and reviewed in Rehabtracker with patient and/or family this         date. Treatment Plan for Next Session:   Continue current POC    Assessment: Pt demonstrated decreased functional performance capacity during  toileting task today. Pt required max A x2 for toilet transfer using Steady. Pt dependent for toileting today.      Treatment/Activity Tolerance:   [x] Tolerated treatment with no adverse effects    [] Patient limited by fatigue  [] Patient limited by pain   [] Patient limited by medical complications:    [] Adverse reaction to Tx:   [] Significant change in status    Safety:       []  bed alarm set    [x]  chair alarm set    []  Pt refused alarms                []  Telesitter activated      [x]  Gait belt used during tx session      []other:       Number of Minutes/Billable Intervention  Therapeutic Exercise    ADL Self-care x   Neuro Re-Ed    Therapeutic Activity 25 OTBRYANT/L [id number]  2/13/2020, 5:58 PM

## 2020-02-13 NOTE — PLAN OF CARE
Compliance with prescribed medication regimen will improve  Description  Compliance with prescribed medication regimen will improve  2/13/2020 0135 by Arnel Osei RN  Outcome: Ongoing  2/12/2020 1634 by Sascha Pack RN  Outcome: Ongoing  Goal: Patient specific goal  Description  Patient specific goal  2/13/2020 0135 by Arnel Osei RN  Outcome: Ongoing  2/12/2020 1634 by Sascha Pack RN  Outcome: Ongoing     Problem: Risk for Impaired Skin Integrity  Goal: Tissue integrity - skin and mucous membranes  Description  Structural intactness and normal physiological function of skin and  mucous membranes. 2/13/2020 0135 by Arnel Osei RN  Outcome: Ongoing  2/12/2020 1634 by Sascha Pack RN  Outcome: Ongoing     Problem: Pain:  Description  Pain management should include both nonpharmacologic and pharmacologic interventions.   Goal: Pain level will decrease  Description  Pain level will decrease  2/13/2020 0135 by Arnel Osei RN  Outcome: Ongoing  2/12/2020 1634 by Sascha Pack RN  Outcome: Ongoing  Goal: Control of acute pain  Description  Control of acute pain  2/13/2020 0135 by Arnel Osei RN  Outcome: Ongoing  2/12/2020 1634 by Sascha Pack RN  Outcome: Ongoing  Goal: Control of chronic pain  Description  Control of chronic pain  2/13/2020 0135 by Arnel Osei RN  Outcome: Ongoing  2/12/2020 1634 by Sascha Pack RN  Outcome: Ongoing

## 2020-02-13 NOTE — PLAN OF CARE
Problem: Falls - Risk of:  Goal: Will remain free from falls  Description  Will remain free from falls  2/13/2020 1120 by Merline Layman, RN  Outcome: Ongoing  2/13/2020 0135 by Dilma Ruiz RN  Outcome: Ongoing  Goal: Absence of physical injury  Description  Absence of physical injury  2/13/2020 1120 by Merline Layman, RN  Outcome: Ongoing  2/13/2020 0135 by Dilma Ruiz RN  Outcome: Ongoing     Problem: Altered Mood, Psychotic Behavior:  Goal: Able to demonstrate trust by eating, participating in treatment and following staff's direction  Description  Able to demonstrate trust by eating, participating in treatment and following staff's direction  2/13/2020 1120 by Merline Layman, RN  Outcome: Ongoing  2/13/2020 0135 by Dilma Ruzi RN  Outcome: Ongoing  Goal: Able to verbalize decrease in frequency and intensity of hallucinations  Description  Able to verbalize decrease in frequency and intensity of hallucinations  2/13/2020 1120 by Merline Layman, RN  Outcome: Ongoing  2/13/2020 0135 by Dilma Ruiz RN  Outcome: Ongoing  Goal: Able to verbalize reality based thinking  Description  Able to verbalize reality based thinking  2/13/2020 1120 by Merline Layman, RN  Outcome: Ongoing  2/13/2020 0135 by Dilma Ruiz RN  Outcome: Ongoing  Goal: Absence of self-harm  Description  Absence of self-harm  2/13/2020 1120 by Merline Layman, RN  Outcome: Ongoing  2/13/2020 0135 by Dilma Ruiz RN  Outcome: Ongoing  Goal: Ability to achieve adequate nutritional intake will improve  Description  Ability to achieve adequate nutritional intake will improve  2/13/2020 1120 by Merline Layman, RN  Outcome: Ongoing  2/13/2020 0135 by Dilma Ruiz RN  Outcome: Ongoing  Goal: Ability to interact with others will improve  Description  Ability to interact with others will improve  2/13/2020 1120 by Merline Layman, RN  Outcome: Ongoing  2/13/2020 0135 by Dilma Ruiz RN  Outcome: Ongoing  Goal: Compliance with

## 2020-02-14 LAB
GLUCOSE BLD-MCNC: 110 MG/DL (ref 70–99)
GLUCOSE BLD-MCNC: 119 MG/DL (ref 70–99)
GLUCOSE BLD-MCNC: 149 MG/DL (ref 70–99)

## 2020-02-14 PROCEDURE — 6370000000 HC RX 637 (ALT 250 FOR IP): Performed by: PSYCHIATRY & NEUROLOGY

## 2020-02-14 PROCEDURE — 99233 SBSQ HOSP IP/OBS HIGH 50: CPT | Performed by: NURSE PRACTITIONER

## 2020-02-14 PROCEDURE — 6370000000 HC RX 637 (ALT 250 FOR IP): Performed by: INTERNAL MEDICINE

## 2020-02-14 PROCEDURE — 82962 GLUCOSE BLOOD TEST: CPT

## 2020-02-14 PROCEDURE — 97530 THERAPEUTIC ACTIVITIES: CPT

## 2020-02-14 PROCEDURE — 6370000000 HC RX 637 (ALT 250 FOR IP): Performed by: HOSPITALIST

## 2020-02-14 PROCEDURE — 1240000000 HC EMOTIONAL WELLNESS R&B

## 2020-02-14 PROCEDURE — 6370000000 HC RX 637 (ALT 250 FOR IP): Performed by: NURSE PRACTITIONER

## 2020-02-14 PROCEDURE — 97110 THERAPEUTIC EXERCISES: CPT

## 2020-02-14 RX ORDER — ZIPRASIDONE HYDROCHLORIDE 40 MG/1
80 CAPSULE ORAL 2 TIMES DAILY WITH MEALS
Status: DISCONTINUED | OUTPATIENT
Start: 2020-02-14 | End: 2020-02-15

## 2020-02-14 RX ADMIN — LORAZEPAM 1 MG: 1 TABLET ORAL at 00:04

## 2020-02-14 RX ADMIN — LISINOPRIL 10 MG: 10 TABLET ORAL at 08:07

## 2020-02-14 RX ADMIN — ASPIRIN AND DIPYRIDAMOLE 1 CAPSULE: 25; 200 CAPSULE, EXTENDED RELEASE ORAL at 20:33

## 2020-02-14 RX ADMIN — ATORVASTATIN CALCIUM 80 MG: 40 TABLET, FILM COATED ORAL at 08:07

## 2020-02-14 RX ADMIN — TRAZODONE HYDROCHLORIDE 50 MG: 50 TABLET ORAL at 00:04

## 2020-02-14 RX ADMIN — METOPROLOL SUCCINATE 50 MG: 50 TABLET, EXTENDED RELEASE ORAL at 08:07

## 2020-02-14 RX ADMIN — DULOXETINE HYDROCHLORIDE 60 MG: 30 CAPSULE, DELAYED RELEASE ORAL at 08:07

## 2020-02-14 RX ADMIN — TAMSULOSIN HYDROCHLORIDE 0.4 MG: 0.4 CAPSULE ORAL at 08:07

## 2020-02-14 RX ADMIN — ZIPRASIDONE HYDROCHLORIDE 60 MG: 20 CAPSULE ORAL at 08:07

## 2020-02-14 RX ADMIN — AMLODIPINE BESYLATE 5 MG: 5 TABLET ORAL at 08:07

## 2020-02-14 RX ADMIN — LORAZEPAM 0.5 MG: 0.5 TABLET ORAL at 08:08

## 2020-02-14 RX ADMIN — DIVALPROEX SODIUM 500 MG: 500 TABLET, EXTENDED RELEASE ORAL at 08:07

## 2020-02-14 RX ADMIN — LORAZEPAM 0.5 MG: 0.5 TABLET ORAL at 20:32

## 2020-02-14 RX ADMIN — LEVOTHYROXINE SODIUM 75 MCG: 0.07 TABLET ORAL at 06:24

## 2020-02-14 RX ADMIN — BENZTROPINE MESYLATE 1 MG: 1 TABLET ORAL at 20:32

## 2020-02-14 RX ADMIN — LACOSAMIDE 200 MG: 100 TABLET, FILM COATED ORAL at 20:32

## 2020-02-14 RX ADMIN — BENZTROPINE MESYLATE 1 MG: 1 TABLET ORAL at 08:07

## 2020-02-14 RX ADMIN — LAMOTRIGINE 125 MG: 100 TABLET ORAL at 08:07

## 2020-02-14 RX ADMIN — LACOSAMIDE 200 MG: 100 TABLET, FILM COATED ORAL at 08:07

## 2020-02-14 RX ADMIN — ZIPRASIDONE HYDROCHLORIDE 80 MG: 40 CAPSULE ORAL at 16:45

## 2020-02-14 RX ADMIN — MELATONIN TAB 3 MG 3 MG: 3 TAB at 20:32

## 2020-02-14 RX ADMIN — LAMOTRIGINE 125 MG: 100 TABLET ORAL at 20:32

## 2020-02-14 ASSESSMENT — PAIN SCALES - WONG BAKER: WONGBAKER_NUMERICALRESPONSE: 0

## 2020-02-14 ASSESSMENT — PAIN SCALES - GENERAL: PAINLEVEL_OUTOF10: 0

## 2020-02-14 NOTE — BH NOTE
This nurse responded to bed alarm sounding. Pt seated on side of bed states he has \"to get up and go to work. \" Attempted to redirect verbally and assisted back into comfortable position in bed.

## 2020-02-14 NOTE — PROGRESS NOTES
Physical Therapy    Physical Therapy Treatment Note  Name: Junior Aguilar MRN: [id number] :   1955   Date:  2020   Admission Date: 2020 Room:  1046/1046-01   Restrictions/Precautions:  Restrictions/Precautions  Restrictions/Precautions: General Precautions, Fall Risk  Required Braces or Orthoses?: No       Communication with other providers:  Co-treat with OT. Discussed safety with transfers with nurse. Recommend use of Stedy or thelma dependent on p's alertness. Subjective:  Patient states:  \"I had to go to the hospital\"  Pain:   Location, Type, Intensity (0/10 to 10/10): Does not report  Objective:    Observation:  P sitting up in wc in main room. Treatment, including education/measures: There. Ex.: P performs seated LE exercises x 10 reps marching and 8 reps LAQ. P requires mod verbal and tactile cues to increase magnitude of movement. Functional mobility: P performs sit <> stand from  to [facility] x 4 reps with min A to stand and max cues for set up. P requires min to mod A for balance upon standing. P stands with flexion at hips, posterior lean and RUMA knee flexion. P unable to correct posture despite tactile cues. P requires min A for eccentric control sitting. P left sitting up in  with chair alarm on. Assessment / Impression:    P with increased fatigue today and difficulty maintaining upright standing. P limited by LE weakness and decreased attention to task.  Recommend continued skilled PT to address deficits and maximize functional potential.   Patient's tolerance of treatment:  good   Adverse Reaction: fatigue  Significant change in status and impact:  Decreased standing tolerance  Barriers to improvement:  Decreased alertness, LE weakness  Plan for Next Session:    Progress mobility as appropriate  Time in:  1430  Time out:  1448  Timed treatment minutes:  18  Total treatment time:  18    Previously filed items:  Social/Functional History  Lives With: Other (comment)(Group

## 2020-02-14 NOTE — PLAN OF CARE
[facility]  Week 2 Interdisciplinary Treatment Plan Note     Review Date & Time: 02/14/20  1045    Admission Type:   Admission Type:  Voluntary    Reason for admission:  Reason for Admission: Recent seizure resulting in decrease in functioning, suspected exacerbation of schizophrenia    Patient Diagnosis: Schizophrenia ([facility]      PATIENT STRENGTHS:  Patient Strengths:Strengths: Medication Compliance  Patient Strengths and Limitations:Limitations: (LIVE)  Addictive Behavior:Addictive Behavior  In the past 3 months, have you felt or has someone told you that you have a problem with:  : Other(Comments)(LIVE)  Do you have a history of Chemical Use?: No  Do you have a history of Alcohol Use?: No  Do you have a history of Street Drug Abuse?: No  Histroy of Prescripton Drug Abuse?: No  Medical Problems:   Past Medical History:   Diagnosis Date    Avascular necrosis of bone ([facility]     right hip    CAD (coronary artery disease)     Coronary atherosclerosis     Dementia     Dementia ([facility]     Dizziness     cerebelar atrophy    Environmental allergies     Essential hypertension     H/O Doppler ultrasound 07/11/2016    carotid - normal    IBS (irritable bowel syndrome)     Mental disability     Recurrent falls     S/P PTCA (percutaneous transluminal coronary angioplasty) 06/16/2016    Cx OM stent    Seizure disorder ([facility]     Urge incontinence of urine     Wrist joint pain     chronic left       Risk:  Fall RiskTotal: 106  Andre Scale Andre Scale Score: 15  BVC Total: 1    Status EXAM:   Status and Exam  Normal: Yes  Facial Expression: Brightened  Affect: Appropriate  Level of Consciousness: Alert  Mood:Normal: Yes  Mood: Helpless  Motor Activity:Normal: No  Motor Activity: Unusual Posture/Gait, Decreased  Interview Behavior: Cooperative  Preception: [place] to Person  Attention:Normal: Yes  Attention: Distractible  Thought Processes: Loose Assoc.   Thought Content:Normal: No  Thought Content: Poverty of Content  Hallucinations: None  Delusions: No  Memory:Normal: No  Memory: Poor Recent  Insight and Judgment: No  Insight and Judgment: Poor Judgment, Poor Insight, Unrealistic  Present Suicidal Ideation: No  Present Homicidal Ideation: No    Daily Assessment Last Entry:   Daily Sleep (WDL): Exceptions to WDL  Patient Currently in Pain: Denies  Daily Nutrition (WDL): Within Defined Limits  Appetite Change: Normal for patient  Barriers to Nutrition: Cognitive impairment, Preparation difficulties  Level of Assistance: Set up    Patient Monitoring:  Frequency of Checks: 4 times per hour, close    Psychiatric Symptoms:   Depression Symptoms  Depression Symptoms: No problems reported or observed. Anxiety Symptoms  Anxiety Symptoms: Generalized  Jessica Symptoms  Jessica Symptoms: No problems reported or observed. Psychosis Symptoms  Delusion Type: No problems reported or observed.     Suicide Risk CSSR-S:  1) Within the past month, have you wished you were dead or wished you could go to sleep and not wake up? : No  2) Have you actually had any thoughts of killing yourself? : No  6) Have you ever done anything, started to do anything, or prepared to do anything to end your life?: No    EDUCATION:   Learner Progress Toward Treatment Goals: Reviewed goals and plan of care    Method: Group    Outcome: No evidence of Learning    PATIENT GOALS: Med titration, compliance with unit programming    PLAN/TREATMENT RECOMMENDATIONS UPDATE: Med titration    Estimated Length of Stay Update:  7 days   Estimated Discharge Date Update: 02/21/20  Discharge Criteria: Med titration      SHORT-TERM GOALS UPDATE:  Time frame for Short-Term Goals: 7 days     LONG-TERM GOALS UPDATE:  Time frame for Long-Term Goals: 7 days   Members Present in Team Meeting: See Signature Sheet    Garland Cassidy MSW, LSW

## 2020-02-14 NOTE — GROUP NOTE
Group Therapy Note    Date: 2/14/2020    Group Start Time: 3186  Group End Time: 7163  Group Topic: Psychoeducation    [address]    NGA VasquezS, MA        Group Therapy Note    Attendees: 2/6       Notes:  Pts participated in recreational therapy group; Relaxation Through Music. Pts were each allowed to pick a song they could listen to that will help them relax. Pts were encouraged to relax and socialize as the music was playing. Pt participated well in the group. Pt unable to choose specific songs, but was able to request to listen to music from the 50s. Pt noted to alejandro his head up and down in time to the music. Pt noted to smile occasionally. Status After Intervention:  Improved    Participation Level:  Active Listener and Interactive    Participation Quality: Appropriate, Attentive and Sharing      Speech:  normal      Thought Process/Content: Logical      Affective Functioning: Blunted      Mood: Blunted      Level of consciousness:  Alert and Attentive      Response to Learning: Able to verbalize current knowledge/experience      Endings: None Reported    Modes of Intervention: Socialization, Exploration and Activity      Discipline Responsible: Certified Therapeutic Recreation Specialist      Signature:  Yana Howell, [address] [place] [address]

## 2020-02-14 NOTE — GROUP NOTE
Group Therapy Note    Date: 2/14/2020    Group Start Time: 1320  Group End Time: 0272    Number of Participants: 2/6    Type: Exercise/Recreation Group    Group Topic/Objective: Chair Exercises    Notes:  Pt excused from group d/t pt has started to eat lunch at this time.        Discipline Responsible: Certified Therapeutic Recreation Specialist    Electronically signed by Tatum Trevino [address] [place] [address] on 2/14/2020 at 2:04 PM

## 2020-02-14 NOTE — GROUP NOTE
Group Therapy Note    Date: 2/14/2020    Group Start Time: 0830  Group End Time: 0845    Number of Participants: 1/5    Type: Morning Goals Group/ Community Meeting    Group Topic/Objective: Set Goal For The Day and to review Unit Rules and Regulations. Patient's Goal:  Pt states his goal is [employer]. \"    Notes:  Pt only individual to attend groups. Pt noted to laugh and joke with therapist throughout time in group. Pt able to recall exercise activity from yesterday and requested to participate in the activity again today. Pt continues to struggle to provide numerical values to items. Pt endorsed feelings of anxiety, but unable to identify what is causing him anxiety at this time. Depression (0-10): 0    Anxiety (0-10): Pt states \"a lot. \"    Irritability/Aggitation (0-10): 0    Status After Intervention:  Improved    Participation Level:  Active Listener and Interactive    Participation Quality: Appropriate, Attentive and Sharing    Speech:  normal    Thought Process/Content: Logical    Affective Functioning: Congruent    Mood: Bright    Level of consciousness:  Alert and Attentive    Response to Learning: Able to verbalize current knowledge/experience and Able to verbalize/acknowledge new learning    Endings: None Reported    Modes of Intervention: Education, Support and Socialization    Discipline Responsible: Certified Therapeutic Recreation Specialist    Electronically signed by Ezra Kumar on 2/14/2020 at 9:17 AM

## 2020-02-14 NOTE — PLAN OF CARE
Problem: Falls - Risk of:  Goal: Will remain free from falls  Description  Will remain free from falls  Outcome: Ongoing  Goal: Absence of physical injury  Description  Absence of physical injury  Outcome: Ongoing     Problem: Altered Mood, Psychotic Behavior:  Goal: Able to demonstrate trust by eating, participating in treatment and following staff's direction  Description  Able to demonstrate trust by eating, participating in treatment and following staff's direction  Outcome: Ongoing  Goal: Able to verbalize decrease in frequency and intensity of hallucinations  Description  Able to verbalize decrease in frequency and intensity of hallucinations  Outcome: Ongoing  Goal: Able to verbalize reality based thinking  Description  Able to verbalize reality based thinking  Outcome: Ongoing  Goal: Absence of self-harm  Description  Absence of self-harm  Outcome: Ongoing  Goal: Ability to achieve adequate nutritional intake will improve  Description  Ability to achieve adequate nutritional intake will improve  Outcome: Ongoing  Goal: Ability to interact with others will improve  Description  Ability to interact with others will improve  Outcome: Ongoing  Goal: Compliance with prescribed medication regimen will improve  Description  Compliance with prescribed medication regimen will improve  Outcome: Ongoing  Goal: Patient specific goal  Description  Patient specific goal  Outcome: Ongoing     Problem: Risk for Impaired Skin Integrity  Goal: Tissue integrity - skin and mucous membranes  Description  Structural intactness and normal physiological function of skin and  mucous membranes. Outcome: Ongoing     Problem: Pain:  Description  Pain management should include both nonpharmacologic and pharmacologic interventions.   Goal: Pain level will decrease  Description  Pain level will decrease  Outcome: Ongoing  Goal: Control of acute pain  Description  Control of acute pain  Outcome: Ongoing  Goal: Control of chronic pain  Description  Control of chronic pain  Outcome: Ongoing

## 2020-02-14 NOTE — PROGRESS NOTES
 Recurrent falls     S/P PTCA (percutaneous transluminal coronary angioplasty) 06/16/2016    Cx OM stent    Seizure disorder (HCC)     Urge incontinence of urine     Wrist joint pain     chronic left        Patient Active Problem List   Diagnosis    Obsessive-compulsive disorder    IBS (irritable bowel syndrome)    Mental disability    Uncontrolled type 2 diabetes mellitus with diabetic polyneuropathy, without long-term current use of insulin ([facility] [address] [facility]    Coronary artery disease involving native coronary artery of native heart with unstable angina pectoris ([facility] [address] [facility]    Essential hypertension    Dyslipidemia    Ataxia    Late effects of CVA (cerebrovascular accident)    Chronic idiopathic constipation    Seizure disorder (HCC)    Acute metabolic encephalopathy    Peripheral polyneuropathy    Hypoglycemia associated with diabetes ([facility] [address] [facility]    Dysmetria    Coronary atherosclerosis    Urge incontinence of urine    Dementia (HCC)    Dizziness    Recurrent falls    Wrist joint pain    Avascular necrosis of bone (HCC)    AMS (altered mental status)    Acute exacerbation of chronic schizophrenia ([facility] [address] [facility]    Schizophrenia ([facility] [address] [facility]       Review of Systems    OBJECTIVE  Vital Signs:  Vitals:    02/14/20 0910   BP: 114/64   Pulse: 86   Resp: 16   Temp: 97.6 °F (36.4 °C)   SpO2: 97%       Labs:  Recent Results (from the past 48 hour(s))   POCT Glucose    Collection Time: 02/12/20  4:56 PM   Result Value Ref Range    POC Glucose 110 (H) 70 - 99 MG/DL   POCT Glucose    Collection Time: 02/12/20  8:27 PM   Result Value Ref Range    POC Glucose 105 (H) 70 - 99 MG/DL   POCT Glucose    Collection Time: 02/13/20  7:46 AM   Result Value Ref Range    POC Glucose 118 (H) 70 - 99 MG/DL   POCT Glucose    Collection Time: 02/13/20 11:49 AM   Result Value Ref Range    POC Glucose 100 (H) 70 - 99 MG/DL   POCT Glucose    Collection Time: 02/13/20  4:18 PM   Result Value Ref Range    POC Glucose 122 (H) 70 - 99 MG/DL   POCT Glucose Collection Time: 02/13/20  7:37 PM   Result Value Ref Range    POC Glucose 152 (H) 70 - 99 MG/DL       PSYCHIATRIC ASSESSMENT / MENTAL STATUS EXAM:   Vitals: Blood pressure 114/64, pulse 86, temperature 97.6 °F (36.4 °C), temperature source Temporal, resp. rate 16, height 6' 2\" (1.88 m), weight 195 lb (88.5 kg), SpO2 97 %. CONSTITUTIONAL:    Appearance: appears stated age. Alert and oriented to person only. No acute distress. Adequate grooming and hygiene. Fair eye contact. No prominent physical abnormalities. Attitude: Manner is cooperative and pleasant  Motor: No psychomotor agitation, retardation or abnormal movements noted  Speech: slurred; normal rate, volume, tone & amount. Language: intact understanding and production  Mood: depressed  Affect: depressed but improved, decreased range, non-labile, congruent with mood and content of speech  Thought Production: Spontaneous. Thought Form: Noted tangentiality and circumstantiality with flight of ideas and loosening of associations. Thought Content/Perceptions: No WILLIAM, noted AVH, noted delusion  Insight: questionable  Judgment: questionable  Memory: Immediate, recent, and remote appear intact, though not formally tested. Attention: maintained throughout interview  Fund of knowledge: Average  Gait/Balance: WNL/WNL    IMPRESSION:   No change in diagnosis        PLAN:  Psychiatric management:medication initiation and titration, group and individual therapy, safe and therapeutic environment. Status of problem/condition: Improving  Medical co-morbidities: Management per [facility] group, appreciate assistance  Legal Status:Pending  Disposition:estimated LOS: Pending. The treatment team reviewed with the patient the diagnosis and treatment recommendations to include the risks, benefits, and side effects of chosen medications. The patient verbalized understanding and agreed with the treatment regimen as outlined above.   The patient was encouraged to participate in groups. Medical records, Labs, Diagnotic tests reviewed  q15 min safety checks for safety  Interval History. Review current labs  Continue Depakote  mg PO QHS for mood. Continue Lamictal at reduced dose of 150 mg PO BID due to initiation of Depakote ER. Continue current medications - increase ziprasidone to 80 mg BID for AVH  Supportive Therapy Provided  Pt had an opportunity to ask questions and address concerns  Pt encouraged to continue therapy group or individual.  Pt was in agreement with treatment plan. The risks benefits and side effects of medications were discussed with the patient, including alternatives and no treatment.     Electronically signed by JOSE Gaytan CNP on 2/14/2020 at 12:21 PM

## 2020-02-14 NOTE — PROGRESS NOTES
Attempted 1:1 session with pt at ~1420. Pt lethargic and unable to stay awake. Therapist will attempt again at a later time.      Electronically signed by LUCY Hartley MA on 2/14/2020 at 2:26 PM

## 2020-02-14 NOTE — PLAN OF CARE
prescribed medication regimen will improve  Description  Compliance with prescribed medication regimen will improve  2/14/2020 1147 by Ever Rankin RN  Outcome: Ongoing  2/14/2020 0330 by Lenora Ferrera RN  Outcome: Ongoing  Goal: Patient specific goal  Description  Patient specific goal  2/14/2020 1147 by Ever Rankin RN  Outcome: Ongoing  2/14/2020 0330 by Lenora Ferrera RN  Outcome: Ongoing     Problem: Risk for Impaired Skin Integrity  Goal: Tissue integrity - skin and mucous membranes  Description  Structural intactness and normal physiological function of skin and  mucous membranes.   2/14/2020 1147 by Ever Rankin RN  Outcome: Ongoing  2/14/2020 0330 by Lenora Ferrera RN  Outcome: Ongoing     Problem: Pain:  Goal: Pain level will decrease  Description  Pain level will decrease  2/14/2020 1147 by Ever Rankin RN  Outcome: Ongoing  2/14/2020 0330 by Lenora Ferrera RN  Outcome: Ongoing  Goal: Control of acute pain  Description  Control of acute pain  2/14/2020 1147 by Ever Rankin RN  Outcome: Ongoing  2/14/2020 0330 by Lenora Ferrera RN  Outcome: Ongoing  Goal: Control of chronic pain  Description  Control of chronic pain  2/14/2020 1147 by Ever Rankin RN  Outcome: Ongoing  2/14/2020 0330 by Lenora Ferrera RN  Outcome: Ongoing

## 2020-02-14 NOTE — PROGRESS NOTES
Occupational Therapy    Occupational Therapy Treatment Note  Name: Yobani Pepper MRN: [id number] :   1955   Date:  2020   Admission Date: 2020 Room:  10461046-01   Restrictions/Precautions:  Restrictions/Precautions  Restrictions/Precautions: General Precautions, Fall Risk  Required Braces or Orthoses?: No   Communication with other providers:   Cleared for treatment by RN. Subjective:  Patient states:  Pt agreeable to complete therapy  Pain:   Location, Type, Intensity (0/10 to 10/10): None rated    Objective:    Observation:  Pt drowsy and alert if asked questions. Treatment, including education:  Pt cotreated with PT for safety concerns while standing. Pt completed sit to stands in [address] [place] [address] with Min to Mod A x 2 with mod verbal/tactiles cues to correct posture with fair to poor carryover. For BUE strengthening for ADL & functional mobility Indep pt performed BUE strengthening HEP using Medium strength theraband  X 3 exer for R/L UE x 10 reps  c minimal rest breaks. Assessment / Impression:        Patient's tolerance of treatment: Fair   Adverse Reaction: None  Significant change in status and impact:  None  Barriers to improvement:  Decreased strength and endurance. Plan for Next Session:    Continue with POC. Time in:  1427  Time out:  1552  Timed treatment minutes:  25  Total treatment time:  25  Electronically signed by:    Jose Bolton, [address]    2020, 2:58 PM    Previously filed values:     Short term goals  Time Frame for Short term goals: until DC or goals met  Short term goal 1: Pt will complete transfers mod . I (bed, chair, toilet)  Short term goal 2: Pt will complete UE/LE bathing mod I  Short term goal 3: Pt will complete UE/LE dressing mod I  Short term goal 4: Pt will complete toileting mod I  Short term goal 5: Pt will demo 3+ min of standing/functional mobility for ALDs mod I

## 2020-02-14 NOTE — PROGRESS NOTES
PT on unit earlier in shift to work with pt. PT states Mack Gaster lift should be used with pt during transfers. Asked about [place] and agreed would work well for pt and will attempt to get one for the unit.

## 2020-02-14 NOTE — BH NOTE
Pt awoke disoriented and restless attempting to get out of bed. Pt stated he needed to \"go shopping to get a new shirt\" and that he wanted to watch the news. Attempts to reorient verbally ineffective and Pt continues to try and get out of bed. Pt toileted and assisted to common area, given snack and po fluids. Pt continues to be restless attempting to stand up from w/c stating he needs to get up and \"check the door. \" Pt believes he is at his home. Pt utilized PRN Trazodone and anxiolytic po per orders, awaiting effectiveness.

## 2020-02-14 NOTE — BH NOTE
Pt returned to bed at 0100 and rested until 0300. At that time Pt awoke again confused attempting to get out of bed. Pt asked this nurse \"Did you answer the phone? Remember? \" Attempted to reorient verbally, toileted Pt and assisted to a position of comfort in bed. Alarm in place and functioning.

## 2020-02-15 LAB
GLUCOSE BLD-MCNC: 106 MG/DL (ref 70–99)
GLUCOSE BLD-MCNC: 109 MG/DL (ref 70–99)
GLUCOSE BLD-MCNC: 120 MG/DL (ref 70–99)
GLUCOSE BLD-MCNC: 81 MG/DL (ref 70–99)

## 2020-02-15 PROCEDURE — 6370000000 HC RX 637 (ALT 250 FOR IP): Performed by: NURSE PRACTITIONER

## 2020-02-15 PROCEDURE — 6370000000 HC RX 637 (ALT 250 FOR IP): Performed by: INTERNAL MEDICINE

## 2020-02-15 PROCEDURE — 1240000000 HC EMOTIONAL WELLNESS R&B

## 2020-02-15 PROCEDURE — 82962 GLUCOSE BLOOD TEST: CPT

## 2020-02-15 PROCEDURE — 99232 SBSQ HOSP IP/OBS MODERATE 35: CPT | Performed by: PSYCHIATRY & NEUROLOGY

## 2020-02-15 PROCEDURE — 6370000000 HC RX 637 (ALT 250 FOR IP): Performed by: PSYCHIATRY & NEUROLOGY

## 2020-02-15 PROCEDURE — 6370000000 HC RX 637 (ALT 250 FOR IP): Performed by: HOSPITALIST

## 2020-02-15 RX ADMIN — BENZTROPINE MESYLATE 1 MG: 1 TABLET ORAL at 20:42

## 2020-02-15 RX ADMIN — LAMOTRIGINE 125 MG: 100 TABLET ORAL at 20:42

## 2020-02-15 RX ADMIN — METOPROLOL SUCCINATE 50 MG: 50 TABLET, EXTENDED RELEASE ORAL at 08:57

## 2020-02-15 RX ADMIN — BENZTROPINE MESYLATE 1 MG: 1 TABLET ORAL at 08:58

## 2020-02-15 RX ADMIN — LACOSAMIDE 200 MG: 100 TABLET, FILM COATED ORAL at 20:42

## 2020-02-15 RX ADMIN — LORAZEPAM 0.5 MG: 0.5 TABLET ORAL at 08:57

## 2020-02-15 RX ADMIN — TAMSULOSIN HYDROCHLORIDE 0.4 MG: 0.4 CAPSULE ORAL at 08:56

## 2020-02-15 RX ADMIN — LEVOTHYROXINE SODIUM 75 MCG: 0.07 TABLET ORAL at 08:57

## 2020-02-15 RX ADMIN — ASPIRIN AND DIPYRIDAMOLE 1 CAPSULE: 25; 200 CAPSULE, EXTENDED RELEASE ORAL at 20:44

## 2020-02-15 RX ADMIN — ATORVASTATIN CALCIUM 80 MG: 40 TABLET, FILM COATED ORAL at 08:57

## 2020-02-15 RX ADMIN — LAMOTRIGINE 125 MG: 100 TABLET ORAL at 08:56

## 2020-02-15 RX ADMIN — ZIPRASIDONE HYDROCHLORIDE 60 MG: 20 CAPSULE ORAL at 08:57

## 2020-02-15 RX ADMIN — DIVALPROEX SODIUM 500 MG: 500 TABLET, EXTENDED RELEASE ORAL at 08:57

## 2020-02-15 RX ADMIN — LISINOPRIL 10 MG: 10 TABLET ORAL at 08:57

## 2020-02-15 RX ADMIN — DULOXETINE HYDROCHLORIDE 60 MG: 30 CAPSULE, DELAYED RELEASE ORAL at 08:56

## 2020-02-15 RX ADMIN — LACOSAMIDE 200 MG: 100 TABLET, FILM COATED ORAL at 08:56

## 2020-02-15 RX ADMIN — AMLODIPINE BESYLATE 5 MG: 5 TABLET ORAL at 08:57

## 2020-02-15 RX ADMIN — MELATONIN TAB 3 MG 3 MG: 3 TAB at 20:43

## 2020-02-15 RX ADMIN — ZIPRASIDONE HYDROCHLORIDE 60 MG: 20 CAPSULE ORAL at 17:17

## 2020-02-15 RX ADMIN — LORAZEPAM 0.5 MG: 0.5 TABLET ORAL at 20:43

## 2020-02-15 ASSESSMENT — PAIN SCALES - GENERAL: PAINLEVEL_OUTOF10: 0

## 2020-02-15 NOTE — BH NOTE
Group Therapy Note    Date: 2/14/2020  Start Time: 1930  End Time:  2000  Number of Participants: 1      Type of Group: Nursing Education      Notes:  Educated pt on current medication regime uses and side effects. Status After Intervention:  Improved    Participation Level: Active Listener and Interactive    Participation Quality: Appropriate, Attentive and Sharing      Speech:  normal      Thought Process/Content: Logical      Affective Functioning: Blunted      Mood: blunted      Level of consciousness:  Alert and Attentive      Response to Learning: Able to verbalize current knowledge/experience      Endings: None Reported    Modes of Intervention: Education      Discipline Responsible: Licensed Practical Nurse      Signature:   Georgie Carreon LPN

## 2020-02-15 NOTE — PROGRESS NOTES
Patient was alert and appears more coordinated this morning. Patient was able to eat his breakfast and then given a shower with set up and minimal assist. Patient was shaved and was able to help with some of his ADLs, teeth brushing, applying lotion and standing at sink to be able to have his pants pulled up. Patient became increasingly lethargic late morning and was not able to eat his lunch, patient required to be fed. Patient was transferred to a rocker with his legs elevated onto another chair and he promptly fell asleep. Patient woke up after approximately half an hour and was taken to his bed for a nap. Upon awaking patient was soaked in urine requiring a clothing change as well as bed change, patient was disoriented thinking it was morning. Patient reoriented and given a snack, patient is happy and talking with staff and laughing.

## 2020-02-15 NOTE — GROUP NOTE
Group Therapy Note    Date: 2/15/2020    Group Start Time: 1901  Group End Time: 0930  Group Topic: Community Meeting/ AM [facility]    TOÑITO Vyas, JOSE A    Group Therapy Note    Attendees: 6/6       Patient's Goal: \"work with and learn the nurses names\"     Notes: Patient actively participated in group. Patient stated that he was feeling \"alright\" and grateful for \"Zulay\". Patient reported getting restful sleep last night. Patient rated his depression a 5, anxiety a 3, and irritability/agitation 0, on a scale of 0-10, with 10 being the worst and 0 being not at all. Reviewed goal, meal selection, and unit schedule for the day. Status After Intervention:  Unchanged    Participation Level:  Active Listener and Interactive    Participation Quality: Appropriate and Sharing    Speech:  normal    Thought Process/Content: Linear    Affective Functioning: Congruent    Mood: elevated    Level of consciousness:  Alert and Attentive    Response to Learning: Able to verbalize current knowledge/experience    Endings: None Reported    Modes of Intervention: Exploration and Clarifying    Discipline Responsible: /Counselor    Signature: TOÑITO Vyas, JOSE A

## 2020-02-15 NOTE — PROGRESS NOTES
125 ug/mL 108     Results for Honorio Elaine (MRN [id number]) as of 2/13/2020 12:33   Ref.  Range 2/10/2020 17:30   Valproic Acid % Free Latest Ref Range: 5 - 18 % 22 (H)     No TD noted, AIMS=2    Allergies   Allergen Reactions    Zyprexa [Olanzapine] Other (See Comments)     Developed severe tardive dyskinesia       Medications Prior to Admission: ziprasidone (GEODON) 60 MG capsule, Take 1 capsule by mouth 2 times daily (with meals)  metoprolol succinate (TOPROL XL) 100 MG extended release tablet, Take 1 tablet by mouth daily  amLODIPine (NORVASC) 10 MG tablet, Take 0.5 tablets by mouth daily  levothyroxine (SYNTHROID) 75 MCG tablet, Take 1 tablet by mouth Daily  melatonin 3 MG TABS tablet, Take 3 mg by mouth daily  VIMPAT 200 MG tablet, TAKE ONE (1) TABLET BY MOUTH TWO TIMES DAILY  DULoxetine (CYMBALTA) 30 MG extended release capsule, Take 1 capsule by mouth daily  lisinopril (PRINIVIL;ZESTRIL) 10 MG tablet, Take 1 tablet by mouth daily  aspirin-dipyridamole (AGGRENOX)  MG per extended release capsule, Take 1 capsule by mouth nightly   lamoTRIgine (LAMICTAL) 100 MG tablet, Take 250 mg by mouth 2 times daily  atorvastatin (LIPITOR) 80 MG tablet, Take 1 tablet by mouth daily  famotidine (PEPCID) 20 MG tablet, Take 20 mg by mouth nightly  acetaminophen (TYLENOL) 500 MG tablet, Take 1,000 mg by mouth every 4 hours as needed for Pain or Fever  metFORMIN (GLUCOPHAGE) 500 MG tablet, Take 2 tablets by mouth 2 times daily (with meals)  loperamide (IMODIUM) 2 MG capsule, Take 2 mg by mouth every 6 hours as needed for Diarrhea     Past Medical History:   Diagnosis Date    Avascular necrosis of bone (HCC)     right hip    CAD (coronary artery disease)     Coronary atherosclerosis     Dementia     Dementia (HCC)     Dizziness     cerebelar atrophy    Environmental allergies     Essential hypertension     H/O Doppler ultrasound 07/11/2016    carotid - normal    IBS (irritable bowel syndrome)     Mental disability     Recurrent falls     S/P PTCA (percutaneous transluminal coronary angioplasty) 06/16/2016    Cx OM stent    Seizure disorder (HCC)     Urge incontinence of urine     Wrist joint pain     chronic left        Patient Active Problem List   Diagnosis    Obsessive-compulsive disorder    IBS (irritable bowel syndrome)    Mental disability    Uncontrolled type 2 diabetes mellitus with diabetic polyneuropathy, without long-term current use of insulin (HCC)    Coronary artery disease involving native coronary artery of native heart with unstable angina pectoris ([facility] [address]    Essential hypertension    Dyslipidemia    Ataxia    Late effects of CVA (cerebrovascular accident)    Chronic idiopathic constipation    Seizure disorder (HCC)    Acute metabolic encephalopathy    Peripheral polyneuropathy    Hypoglycemia associated with diabetes ([facility] [address]    Dysmetria    Coronary atherosclerosis    Urge incontinence of urine    Dementia (HCC)    Dizziness    Recurrent falls    Wrist joint pain    Avascular necrosis of bone (HCC)    AMS (altered mental status)    Acute exacerbation of chronic schizophrenia ([facility] [address]    Schizophrenia ([facility] [address]       Review of Systems    OBJECTIVE  Vital Signs:  Vitals:    02/14/20 2023   BP: (!) 122/56   Pulse: 71   Resp: 16   Temp: 97.6 °F (36.4 °C)   SpO2: 95%       Labs:  Recent Results (from the past 48 hour(s))   POCT Glucose    Collection Time: 02/13/20  7:46 AM   Result Value Ref Range    POC Glucose 118 (H) 70 - 99 MG/DL   POCT Glucose    Collection Time: 02/13/20 11:49 AM   Result Value Ref Range    POC Glucose 100 (H) 70 - 99 MG/DL   POCT Glucose    Collection Time: 02/13/20  4:18 PM   Result Value Ref Range    POC Glucose 122 (H) 70 - 99 MG/DL   POCT Glucose    Collection Time: 02/13/20  7:37 PM   Result Value Ref Range    POC Glucose 152 (H) 70 - 99 MG/DL   POCT Glucose    Collection Time: 02/14/20 12:18 PM   Result Value Ref Range    POC Glucose 110 (H) 70 - 99 MG/DL   POCT Glucose    Collection Time: 02/14/20  4:37 PM   Result Value Ref Range    POC Glucose 119 (H) 70 - 99 MG/DL   POCT Glucose    Collection Time: 02/14/20  8:04 PM   Result Value Ref Range    POC Glucose 149 (H) 70 - 99 MG/DL       PSYCHIATRIC ASSESSMENT / MENTAL STATUS EXAM:   Vitals: Blood pressure (!) 122/56, pulse 71, temperature 97.6 °F (36.4 °C), temperature source Temporal, resp. rate 16, height 6' 2\" (1.88 m), weight 195 lb (88.5 kg), SpO2 95 %. CONSTITUTIONAL:    Appearance: appears stated age. Alert and oriented to person only. No acute distress. Adequate grooming and hygiene. Fair eye contact. No prominent physical abnormalities. Attitude: Manner is cooperative and pleasant  Motor: No psychomotor agitation, retardation or abnormal movements noted  Speech: slurred; normal rate, volume, tone & amount. Language: intact understanding and production  Mood: depressed  Affect: depressed but improved, decreased range, non-labile, congruent with mood and content of speech  Thought Production: Spontaneous. Thought Form: Noted tangentiality and circumstantiality with flight of ideas and loosening of associations. Thought Content/Perceptions: No WILLIAM, noted AVH, noted delusion  Insight: questionable  Judgment: questionable  Memory: Immediate, recent, and remote appear intact, though not formally tested. Attention: maintained throughout interview  Fund of knowledge: Average  Gait/Balance: WNL/WNL    IMPRESSION:   No change in diagnosis        PLAN:  Psychiatric management:medication initiation and titration, group and individual therapy, safe and therapeutic environment. Status of problem/condition: Improving  Medical co-morbidities: Management per [facility] group, appreciate assistance  Legal Status:Pending  Disposition:estimated LOS: Pending.   The treatment team reviewed with the patient the diagnosis and treatment recommendations to include the risks, benefits, and side effects of chosen

## 2020-02-15 NOTE — GROUP NOTE
Group Therapy Note    Date: 2/15/2020    Group Start Time: 1500  Group End Time: 1162  Group Topic: Healthy Living/Wellness    [address]    TOÑITO Hodge LSW    Group Therapy Note    Attendees: 2/6       Patient's Goal: \"work with and learn the nurses names\"    Notes: Patient was asleep during the group session and unable to participate.      Discipline Responsible: /Counselor    Signature: TOÑITO Hodge LSW

## 2020-02-16 LAB
GLUCOSE BLD-MCNC: 123 MG/DL (ref 70–99)
GLUCOSE BLD-MCNC: 157 MG/DL (ref 70–99)
GLUCOSE BLD-MCNC: 89 MG/DL (ref 70–99)
GLUCOSE BLD-MCNC: 92 MG/DL (ref 70–99)

## 2020-02-16 PROCEDURE — 6370000000 HC RX 637 (ALT 250 FOR IP): Performed by: HOSPITALIST

## 2020-02-16 PROCEDURE — 6370000000 HC RX 637 (ALT 250 FOR IP): Performed by: INTERNAL MEDICINE

## 2020-02-16 PROCEDURE — 82962 GLUCOSE BLOOD TEST: CPT

## 2020-02-16 PROCEDURE — 6370000000 HC RX 637 (ALT 250 FOR IP): Performed by: PSYCHIATRY & NEUROLOGY

## 2020-02-16 PROCEDURE — 99232 SBSQ HOSP IP/OBS MODERATE 35: CPT | Performed by: PSYCHIATRY & NEUROLOGY

## 2020-02-16 PROCEDURE — 6370000000 HC RX 637 (ALT 250 FOR IP): Performed by: NURSE PRACTITIONER

## 2020-02-16 PROCEDURE — 1240000000 HC EMOTIONAL WELLNESS R&B

## 2020-02-16 RX ORDER — DIVALPROEX SODIUM 500 MG/1
500 TABLET, EXTENDED RELEASE ORAL NIGHTLY
Status: DISCONTINUED | OUTPATIENT
Start: 2020-02-17 | End: 2020-02-25 | Stop reason: HOSPADM

## 2020-02-16 RX ORDER — ZIPRASIDONE HYDROCHLORIDE 40 MG/1
40 CAPSULE ORAL
Status: DISCONTINUED | OUTPATIENT
Start: 2020-02-17 | End: 2020-02-19

## 2020-02-16 RX ADMIN — ZIPRASIDONE HYDROCHLORIDE 60 MG: 20 CAPSULE ORAL at 20:12

## 2020-02-16 RX ADMIN — LACOSAMIDE 200 MG: 100 TABLET, FILM COATED ORAL at 20:09

## 2020-02-16 RX ADMIN — LORAZEPAM 0.5 MG: 0.5 TABLET ORAL at 20:11

## 2020-02-16 RX ADMIN — DULOXETINE HYDROCHLORIDE 60 MG: 30 CAPSULE, DELAYED RELEASE ORAL at 08:12

## 2020-02-16 RX ADMIN — LAMOTRIGINE 125 MG: 100 TABLET ORAL at 08:11

## 2020-02-16 RX ADMIN — ASPIRIN AND DIPYRIDAMOLE 1 CAPSULE: 25; 200 CAPSULE, EXTENDED RELEASE ORAL at 20:08

## 2020-02-16 RX ADMIN — LACOSAMIDE 200 MG: 100 TABLET, FILM COATED ORAL at 08:10

## 2020-02-16 RX ADMIN — METOPROLOL SUCCINATE 50 MG: 50 TABLET, EXTENDED RELEASE ORAL at 08:11

## 2020-02-16 RX ADMIN — TAMSULOSIN HYDROCHLORIDE 0.4 MG: 0.4 CAPSULE ORAL at 08:12

## 2020-02-16 RX ADMIN — BENZTROPINE MESYLATE 1 MG: 1 TABLET ORAL at 20:10

## 2020-02-16 RX ADMIN — LAMOTRIGINE 125 MG: 100 TABLET ORAL at 20:11

## 2020-02-16 RX ADMIN — AMLODIPINE BESYLATE 5 MG: 5 TABLET ORAL at 08:11

## 2020-02-16 RX ADMIN — LEVOTHYROXINE SODIUM 75 MCG: 0.07 TABLET ORAL at 08:10

## 2020-02-16 RX ADMIN — MELATONIN TAB 3 MG 3 MG: 3 TAB at 20:13

## 2020-02-16 RX ADMIN — BENZTROPINE MESYLATE 1 MG: 1 TABLET ORAL at 08:11

## 2020-02-16 RX ADMIN — ATORVASTATIN CALCIUM 80 MG: 40 TABLET, FILM COATED ORAL at 08:11

## 2020-02-16 RX ADMIN — LISINOPRIL 10 MG: 10 TABLET ORAL at 08:11

## 2020-02-16 RX ADMIN — LORAZEPAM 0.5 MG: 0.5 TABLET ORAL at 08:11

## 2020-02-16 ASSESSMENT — PAIN SCALES - GENERAL: PAINLEVEL_OUTOF10: 0

## 2020-02-16 ASSESSMENT — PAIN - FUNCTIONAL ASSESSMENT: PAIN_FUNCTIONAL_ASSESSMENT: ACTIVITIES ARE NOT PREVENTED

## 2020-02-16 ASSESSMENT — PAIN SCALES - WONG BAKER: WONGBAKER_NUMERICALRESPONSE: 0

## 2020-02-16 ASSESSMENT — PAIN DESCRIPTION - PROGRESSION: CLINICAL_PROGRESSION: GRADUALLY IMPROVING

## 2020-02-16 NOTE — PROGRESS NOTES
Psychiatric Progress Note    Fiona Guzman  [id number]  02/16/20    CHIEF COMPLAINT: unchanged    HPI: Fiona Guzman  [facility] 59 [facility] who presents for exacerbation of schizophrenia with suicidal ideation and depression severe. Pt noted recent exacerbation of psychosis and mood. Pt noted he currently feels safe and comfortable on the unit. Pt was in agreement with treatment team.  Pt was polite and cordial during the interview process. Pt agreed to start Depakote requiring Lamictal to be cut in half due to medication titration. Pt was in agreement.     Patient was very sleepy today during the interview. Pt noted he is doing [place] today. \"  Pt noted he is sleeping \"okay. \"  Pt noted his appetite is \"alright. \"  Pt noted a reduction in both depression and anxiety pt noted they were \"both very low today. \" Pt  Denied any thoughts to harm himself or anyone else. Pt noted occasional auditory and visual hallucinations yet reduced siginifcantly. Review of the chart notes patient participating in Psychotherapy groups. Physical and Occupational Therapy continue to work with the patient. SW is working on obtaining placement for the patient once ready for discharge. AIMS=1, pt currently noted he did not need medications for the EPS symptoms  NO TD noted    Lamotrigine level normal, valproic acid level free and % free slightly elevated, but given that Depakote recently decreased from 1,000 mg to 500 mg daily do not anticipate any further rise in these levels. Results for Everardo Menendez (MRN [id number]) as of 2/13/2020 12:33   Ref. Range 2/10/2020 17:30   Lamotrigine Lvl Latest Ref Range: 2.5 - 15.0 ug/mL 12.5     Results for Everardo Menendez (MRN [id number]) as of 2/13/2020 12:33   Ref. Range 2/10/2020 17:30   Valproic Acid, Free Latest Ref Range: 7 - 23 ug/mL 24 (H)     Results for Everardo Menendez (MRN [id number]) as of 2/13/2020 12:33   Ref.  Range 2/10/2020 17:30   Valproic Acid, Total Latest Ref Range: 50 - 125 ug/mL 108     Results for Heather Kehr (MRN [id number]) as of 2/13/2020 12:33   Ref.  Range 2/10/2020 17:30   Valproic Acid % Free Latest Ref Range: 5 - 18 % 22 (H)     No TD noted, AIMS=2    Allergies   Allergen Reactions    Zyprexa [Olanzapine] Other (See Comments)     Developed severe tardive dyskinesia       Medications Prior to Admission: ziprasidone (GEODON) 60 MG capsule, Take 1 capsule by mouth 2 times daily (with meals)  metoprolol succinate (TOPROL XL) 100 MG extended release tablet, Take 1 tablet by mouth daily  amLODIPine (NORVASC) 10 MG tablet, Take 0.5 tablets by mouth daily  levothyroxine (SYNTHROID) 75 MCG tablet, Take 1 tablet by mouth Daily  melatonin 3 MG TABS tablet, Take 3 mg by mouth daily  VIMPAT 200 MG tablet, TAKE ONE (1) TABLET BY MOUTH TWO TIMES DAILY  DULoxetine (CYMBALTA) 30 MG extended release capsule, Take 1 capsule by mouth daily  lisinopril (PRINIVIL;ZESTRIL) 10 MG tablet, Take 1 tablet by mouth daily  aspirin-dipyridamole (AGGRENOX)  MG per extended release capsule, Take 1 capsule by mouth nightly   lamoTRIgine (LAMICTAL) 100 MG tablet, Take 250 mg by mouth 2 times daily  atorvastatin (LIPITOR) 80 MG tablet, Take 1 tablet by mouth daily  famotidine (PEPCID) 20 MG tablet, Take 20 mg by mouth nightly  acetaminophen (TYLENOL) 500 MG tablet, Take 1,000 mg by mouth every 4 hours as needed for Pain or Fever  metFORMIN (GLUCOPHAGE) 500 MG tablet, Take 2 tablets by mouth 2 times daily (with meals)  loperamide (IMODIUM) 2 MG capsule, Take 2 mg by mouth every 6 hours as needed for Diarrhea     Past Medical History:   Diagnosis Date    Avascular necrosis of bone (HCC)     right hip    CAD (coronary artery disease)     Coronary atherosclerosis     Dementia     Dementia (HCC)     Dizziness     cerebelar atrophy    Environmental allergies     Essential hypertension     H/O Doppler ultrasound 07/11/2016    carotid - normal    IBS (irritable bowel syndrome)     Mental disability     Recurrent falls     S/P PTCA (percutaneous transluminal coronary angioplasty) 06/16/2016    Cx OM stent    Seizure disorder (HCC)     Urge incontinence of urine     Wrist joint pain     chronic left        Patient Active Problem List   Diagnosis    Obsessive-compulsive disorder    IBS (irritable bowel syndrome)    Mental disability    Uncontrolled type 2 diabetes mellitus with diabetic polyneuropathy, without long-term current use of insulin (HCC)    Coronary artery disease involving native coronary artery of native heart with unstable angina pectoris ([facility] [address]    Essential hypertension    Dyslipidemia    Ataxia    Late effects of CVA (cerebrovascular accident)    Chronic idiopathic constipation    Seizure disorder (HCC)    Acute metabolic encephalopathy    Peripheral polyneuropathy    Hypoglycemia associated with diabetes ([facility] [address]    Dysmetria    Coronary atherosclerosis    Urge incontinence of urine    Dementia (HCC)    Dizziness    Recurrent falls    Wrist joint pain    Avascular necrosis of bone (HCC)    AMS (altered mental status)    Acute exacerbation of chronic schizophrenia ([facility]    Schizophrenia ([facility] [address]       Review of Systems    OBJECTIVE  Vital Signs:  Vitals:    02/16/20 0924   BP: (!) 114/57   Pulse: 71   Resp: 16   Temp: 97.3 °F (36.3 °C)   SpO2: 97%       Labs:  Recent Results (from the past 48 hour(s))   POCT Glucose    Collection Time: 02/14/20 12:18 PM   Result Value Ref Range    POC Glucose 110 (H) 70 - 99 MG/DL   POCT Glucose    Collection Time: 02/14/20  4:37 PM   Result Value Ref Range    POC Glucose 119 (H) 70 - 99 MG/DL   POCT Glucose    Collection Time: 02/14/20  8:04 PM   Result Value Ref Range    POC Glucose 149 (H) 70 - 99 MG/DL   POCT Glucose    Collection Time: 02/15/20  7:31 AM   Result Value Ref Range    POC Glucose 106 (H) 70 - 99 MG/DL   POCT Glucose    Collection Time: 02/15/20 12:10 PM   Result Value Ref Range    POC Glucose 81 70 - 99 MG/DL POCT Glucose    Collection Time: 02/15/20  4:40 PM   Result Value Ref Range    POC Glucose 109 (H) 70 - 99 MG/DL   POCT Glucose    Collection Time: 02/15/20  7:41 PM   Result Value Ref Range    POC Glucose 120 (H) 70 - 99 MG/DL   POCT Glucose    Collection Time: 02/16/20  7:18 AM   Result Value Ref Range    POC Glucose 89 70 - 99 MG/DL       PSYCHIATRIC ASSESSMENT / MENTAL STATUS EXAM:   Vitals: Blood pressure (!) 114/57, pulse 71, temperature 97.3 °F (36.3 °C), temperature source Temporal, resp. rate 16, height 6' 2\" (1.88 m), weight 195 lb (88.5 kg), SpO2 97 %. CONSTITUTIONAL:    Appearance: appears stated age. Alert and oriented to person only. No acute distress. Adequate grooming and hygiene. Fair eye contact. No prominent physical abnormalities. Attitude: Manner is cooperative and pleasant  Motor: No psychomotor agitation, retardation or abnormal movements noted  Speech: slurred; normal rate, volume, tone & amount. Language: intact understanding and production  Mood: depressed  Affect: depressed but improved, decreased range, non-labile, congruent with mood and content of speech  Thought Production: Spontaneous. Thought Form: Noted tangentiality and circumstantiality with flight of ideas and loosening of associations. Thought Content/Perceptions: No WILLIAM, noted AVH, noted delusion  Insight: questionable  Judgment: questionable  Memory: Immediate, recent, and remote appear intact, though not formally tested. Attention: maintained throughout interview  Fund of knowledge: Average  Gait/Balance: WNL/WNL    IMPRESSION:   No change in diagnosis        PLAN:  Psychiatric management:medication initiation and titration, group and individual therapy, safe and therapeutic environment. Status of problem/condition: Improving  Medical co-morbidities: Management per [facility] group, appreciate assistance  Legal Status:Pending  Disposition:estimated LOS: Pending.   The treatment team reviewed with the patient

## 2020-02-16 NOTE — PLAN OF CARE
DIO Smith  Outcome: Ongoing     Problem: Altered Mood, Psychotic Behavior:  Goal: Patient specific goal  Description  Patient specific goal  2/15/2020 2130 by Georgie Carreon LPN  Outcome: Ongoing     Problem: Risk for Impaired Skin Integrity  Goal: Tissue integrity - skin and mucous membranes  Description  Structural intactness and normal physiological function of skin and  mucous membranes.   2/15/2020 2130 by Georgie Carreon LPN  Outcome: Ongoing     Problem: Pain:  Goal: Pain level will decrease  Description  Pain level will decrease  2/15/2020 2130 by Georgie Carreon LPN  Outcome: Ongoing     Problem: Pain:  Goal: Control of acute pain  Description  Control of acute pain  2/15/2020 2130 by Georgie Carreon LPN  Outcome: Ongoing     Problem: Pain:  Goal: Control of chronic pain  Description  Control of chronic pain  2/15/2020 2130 by Georgie Carreon LPN  Outcome: Ongoing

## 2020-02-16 NOTE — GROUP NOTE
Group Therapy Note    Date: 2/16/2020    Group Start Time: 0830  Group End Time: 0915  Group Topic: Community Meeting/ AM Goals [facility]    TOÑITO Duran LSW    Group Therapy Note    Attendees: 6/6       Patient's Goal: \"show me what you are working on and I will do it\"    Notes: Patient actively participated in group. Patient stated that he was feeling \"keep falling asleep\" and grateful for \"things around here\". Patient reported getting \"not many\" hours of restless sleep last night. Patient rated his depression and anxiety as a 4, irritability/agitation as a 0, on a scale of 0 to 10, with 10 being the worst and 0 being not at all. Reviewed goal, meal selection, and unit schedule for the day.      Status After Intervention:  Unchanged    Participation Level: Interactive    Participation Quality: Appropriate and Sharing    Speech:  slurred    Thought Process/Content: Linear    Affective Functioning: Congruent    Mood: unchanged    Level of consciousness:  Attentive and Drowsy    Response to Learning: Able to verbalize current knowledge/experience    Endings: None Reported    Modes of Intervention: Exploration and Clarifying    Discipline Responsible: /Counselor    Signature: TOÑITO Duran LSW

## 2020-02-16 NOTE — PROGRESS NOTES
Patient has been increasingly stronger today and able to assist with transfers and toilet ing. Patient has eaten meals without any difficulty and has been more alert, interactive and happy today. Dr. Rony Sherman in and adjusted medications due to level of sedation and patient inability to assist in any ADL's. Patient has been able to stand to urinate this afternoon and transfer himself from wheelchair to bed with minimal assist and coaching.

## 2020-02-16 NOTE — GROUP NOTE
Group Therapy Note    Date: 2/16/2020    Group Start Time: 1130  Group End Time: 1200  Group Topic: Healthy Living/Wellness/ Exercise Group    [address] [facility]    TOÑITO Vyas LSW    Group Therapy Note    Attendees: 4/7       Patient's Goal: Zollie Monique me what you are working on and I will do it\"    Notes: Patient actively participated in group. Participated in an exercise card game with peers.      Status After Intervention:  Improved    Participation Level: Interactive    Participation Quality: Appropriate    Thought Process/Content: Linear    Affective Functioning: Congruent    Mood: elevated    Level of consciousness:  Alert and Attentive    Response to Learning: Able to verbalize current knowledge/experience    Endings: None Reported    Modes of Intervention: Activity and Movement    Discipline Responsible: /Counselor    Signature: TOÑITO Vyas LSW

## 2020-02-16 NOTE — BH NOTE
Group Therapy Note    Date: 2/15/2020  Start Time: 1930  End Time:  2000  Number of Participants: 1      Type of Group: Nursing Education      Notes:  Educated pt on current medication regime uses and side effects. Status After Intervention:  Improved    Participation Level: Active Listener and Interactive    Participation Quality: Attentive and Sharing      Speech:  normal      Thought Process/Content: Linear      Affective Functioning: Congruent      Mood: elevated      Level of consciousness:  Alert and Drowsy      Response to Learning: Able to verbalize current knowledge/experience      Endings: None Reported    Modes of Intervention: Education      Discipline Responsible: Licensed Practical Nurse      Signature:   Georgie Carreon LPN

## 2020-02-17 LAB
GLUCOSE BLD-MCNC: 104 MG/DL (ref 70–99)
GLUCOSE BLD-MCNC: 105 MG/DL (ref 70–99)
GLUCOSE BLD-MCNC: 157 MG/DL (ref 70–99)
GLUCOSE BLD-MCNC: 98 MG/DL (ref 70–99)

## 2020-02-17 PROCEDURE — 99233 SBSQ HOSP IP/OBS HIGH 50: CPT | Performed by: NURSE PRACTITIONER

## 2020-02-17 PROCEDURE — 1240000000 HC EMOTIONAL WELLNESS R&B

## 2020-02-17 PROCEDURE — 6370000000 HC RX 637 (ALT 250 FOR IP): Performed by: NURSE PRACTITIONER

## 2020-02-17 PROCEDURE — 97530 THERAPEUTIC ACTIVITIES: CPT

## 2020-02-17 PROCEDURE — 6370000000 HC RX 637 (ALT 250 FOR IP): Performed by: INTERNAL MEDICINE

## 2020-02-17 PROCEDURE — 82962 GLUCOSE BLOOD TEST: CPT

## 2020-02-17 PROCEDURE — 6370000000 HC RX 637 (ALT 250 FOR IP): Performed by: HOSPITALIST

## 2020-02-17 PROCEDURE — 6370000000 HC RX 637 (ALT 250 FOR IP): Performed by: PSYCHIATRY & NEUROLOGY

## 2020-02-17 RX ADMIN — LISINOPRIL 10 MG: 10 TABLET ORAL at 08:14

## 2020-02-17 RX ADMIN — BENZTROPINE MESYLATE 1 MG: 1 TABLET ORAL at 08:13

## 2020-02-17 RX ADMIN — MELATONIN TAB 3 MG 3 MG: 3 TAB at 20:14

## 2020-02-17 RX ADMIN — AMLODIPINE BESYLATE 5 MG: 5 TABLET ORAL at 08:14

## 2020-02-17 RX ADMIN — LEVOTHYROXINE SODIUM 75 MCG: 0.07 TABLET ORAL at 06:44

## 2020-02-17 RX ADMIN — LACOSAMIDE 200 MG: 100 TABLET, FILM COATED ORAL at 08:14

## 2020-02-17 RX ADMIN — LAMOTRIGINE 125 MG: 100 TABLET ORAL at 20:13

## 2020-02-17 RX ADMIN — DIVALPROEX SODIUM 500 MG: 500 TABLET, EXTENDED RELEASE ORAL at 20:13

## 2020-02-17 RX ADMIN — LORAZEPAM 0.5 MG: 0.5 TABLET ORAL at 08:13

## 2020-02-17 RX ADMIN — METOPROLOL SUCCINATE 50 MG: 50 TABLET, EXTENDED RELEASE ORAL at 08:13

## 2020-02-17 RX ADMIN — BENZTROPINE MESYLATE 1 MG: 1 TABLET ORAL at 20:13

## 2020-02-17 RX ADMIN — ZIPRASIDONE HYDROCHLORIDE 40 MG: 40 CAPSULE ORAL at 08:14

## 2020-02-17 RX ADMIN — ZIPRASIDONE HYDROCHLORIDE 60 MG: 20 CAPSULE ORAL at 20:13

## 2020-02-17 RX ADMIN — ATORVASTATIN CALCIUM 80 MG: 40 TABLET, FILM COATED ORAL at 08:14

## 2020-02-17 RX ADMIN — LORAZEPAM 0.5 MG: 0.5 TABLET ORAL at 20:13

## 2020-02-17 RX ADMIN — ASPIRIN AND DIPYRIDAMOLE 1 CAPSULE: 25; 200 CAPSULE, EXTENDED RELEASE ORAL at 20:13

## 2020-02-17 RX ADMIN — DULOXETINE HYDROCHLORIDE 60 MG: 30 CAPSULE, DELAYED RELEASE ORAL at 08:14

## 2020-02-17 RX ADMIN — TAMSULOSIN HYDROCHLORIDE 0.4 MG: 0.4 CAPSULE ORAL at 08:14

## 2020-02-17 RX ADMIN — LACOSAMIDE 200 MG: 100 TABLET, FILM COATED ORAL at 20:13

## 2020-02-17 RX ADMIN — LAMOTRIGINE 125 MG: 100 TABLET ORAL at 08:14

## 2020-02-17 ASSESSMENT — PAIN SCALES - GENERAL: PAINLEVEL_OUTOF10: 0

## 2020-02-17 ASSESSMENT — PAIN SCALES - WONG BAKER: WONGBAKER_NUMERICALRESPONSE: 0

## 2020-02-17 NOTE — PROGRESS NOTES
auscultated. Regular rate without appreciable murmurs, rubs, or gallops. Peripheral pulses equal bilaterally and palpable. No peripheral edema. GI Abdomen is soft without significant tenderness, masses, or guarding. Bowel sounds are normoactive. Rectal exam deferred.  Mayen catheter is not present. MSK No gross joint deformities. Spontaneous movement of all extremities  SKIN Normal coloration, warm, dry.     Medications:   Medications:    divalproex  500 mg Oral Nightly    ziprasidone  40 mg Oral Daily with breakfast    And    ziprasidone  60 mg Oral Nightly    vitamin D  50,000 Units Oral Weekly    DULoxetine  60 mg Oral Daily    LORazepam  0.5 mg Oral BID    lamoTRIgine  125 mg Oral BID    amLODIPine  5 mg Oral Daily    metoprolol succinate  50 mg Oral Daily    lacosamide  200 mg Oral BID    aspirin-dipyridamole  1 capsule Oral Nightly    benztropine  1 mg Oral BID    atorvastatin  80 mg Oral Daily    levothyroxine  75 mcg Oral Daily    lisinopril  10 mg Oral Daily    melatonin  3 mg Oral Nightly    insulin lispro  0-12 Units Subcutaneous TID WC    insulin lispro  0-6 Units Subcutaneous Nightly    tamsulosin  0.4 mg Oral Daily      Infusions:    dextrose       PRN Meds: glucose, 15 g, PRN  dextrose, 12.5 g, PRN  glucagon (rDNA), 1 mg, PRN  dextrose, 100 mL/hr, PRN  acetaminophen, 325 mg, Q4H PRN  acetaminophen, 500 mg, Q4H PRN  traZODone, 50 mg, Nightly PRN  magnesium hydroxide, 30 mL, Daily PRN  acetaminophen, 650 mg, Q4H PRN  LORazepam, 1 mg, Q4H PRN    Or  LORazepam, 1 mg, Q4H PRN  haloperidol lactate, 5 mg, Q4H PRN    Or  haloperidol, 5 mg, Q4H PRN          Electronically signed by Sapna Varghese MD on 2/17/2020 at 4:44 PM

## 2020-02-17 NOTE — PROGRESS NOTES
Psychiatric Progress Note    Mason Lock  [id number]  02/17/20    CHIEF COMPLAINT: unchanged    HPI: Mason Lock  is a 59 yo [place] American male who presents for exacerbation of schizophrenia with suicidal ideation and depression severe. Pt noted recent exacerbation of psychosis and mood. Pt noted he currently feels safe and comfortable on the unit. Pt was in agreement with treatment team.  Pt was polite and cordial during the interview process. Pt agreed to start Depakote requiring Lamictal to be cut in half due to medication titration. Pt was in agreement.     Patient was alert today during the interview. Pt noted he is doing [place] today. \"  Pt noted he is sleeping \"okay. \"  Pt noted his appetite is \"alright. \"  Pt noted a reduction in both depression and anxiety stating that he was \"just a little\" depressed, and denied any anxiety. Pt  Denied any thoughts to harm himself or anyone else. Pt denied both auditory and visual hallucinations. Review of the chart notes patient participating in Psychotherapy groups. Physical and Occupational Therapy continue to work with the patient. SW is working on obtaining placement for the patient once ready for discharge. AIMS=0  NO TD noted    Lamotrigine level normal, valproic acid level free and % free slightly elevated, but given that Depakote recently decreased from 1,000 mg to 500 mg daily do not anticipate any further rise in these levels. Results for Tresia Divine (MRN [id number]) as of 2/13/2020 12:33   Ref. Range 2/10/2020 17:30   Lamotrigine Lvl Latest Ref Range: 2.5 - 15.0 ug/mL 12.5     Results for Tresia Divine (MRN [id number]) as of 2/13/2020 12:33   Ref. Range 2/10/2020 17:30   Valproic Acid, Free Latest Ref Range: 7 - 23 ug/mL 24 (H)     Results for Tresia Divine (MRN [id number]) as of 2/13/2020 12:33   Ref.  Range 2/10/2020 17:30   Valproic Acid, Total Latest Ref Range: 50 - 125 ug/mL 108     Results for Tresia Divine (MRN [id number]) as of 2/13/2020 157 (H) 70 - 99 MG/DL   POCT Glucose    Collection Time: 02/17/20  7:28 AM   Result Value Ref Range    POC Glucose 98 70 - 99 MG/DL   POCT Glucose    Collection Time: 02/17/20 11:51 AM   Result Value Ref Range    POC Glucose 104 (H) 70 - 99 MG/DL       PSYCHIATRIC ASSESSMENT / MENTAL STATUS EXAM:   Vitals: Blood pressure 115/65, pulse 81, temperature 97.5 °F (36.4 °C), temperature source Temporal, resp. rate 16, height 6' 2\" (1.88 m), weight 195 lb (88.5 kg), SpO2 97 %. CONSTITUTIONAL:    Appearance: appears stated age. Alert and oriented to person only. No acute distress. Adequate grooming and hygiene. Fair eye contact. No prominent physical abnormalities. Attitude: Manner is cooperative and pleasant  Motor: No psychomotor agitation, retardation or abnormal movements noted  Speech: slurred; normal rate, volume, tone & amount. Language: intact understanding and production  Mood: depressed  Affect: depressed but improved, improved range, non-labile, congruent with mood and content of speech  Thought Production: Spontaneous. Thought Form: Noted tangentiality and circumstantiality with flight of ideas and loosening of associations. Thought Content/Perceptions: No WILLIAM, noted AVH, noted delusion  Insight: questionable  Judgment: questionable  Memory: Immediate, recent, and remote appear intact, though not formally tested. Attention: maintained throughout interview  Fund of knowledge: Average  Gait/Balance: WNL/WNL    IMPRESSION:   No change in diagnosis        PLAN:  Psychiatric management:medication initiation and titration, group and individual therapy, safe and therapeutic environment. Status of problem/condition: Improving  Medical co-morbidities: Management per [facility] group, appreciate assistance  Legal Status:Pending  Disposition:estimated LOS: Pending.   The treatment team reviewed with the patient the diagnosis and treatment recommendations to include the risks, benefits, and side effects of chosen medications. The patient verbalized understanding and agreed with the treatment regimen as outlined above. The patient was encouraged to participate in groups. Medical records, Labs, Diagnotic tests reviewed  q15 min safety checks for safety  Interval History. Review current labs  Continue Depakote  mg PO QHS for mood. Continue Lamictal at reduced dose of 150 mg PO BID due to initiation of Depakote ER. Continue current medications - decrease ziprasidone to 40 mg QAM and 60 mg HS for AVH  Supportive Therapy Provided  Pt had an opportunity to ask questions and address concerns  Pt encouraged to continue therapy group or individual.  Pt was in agreement with treatment plan. The risks benefits and side effects of medications were discussed with the patient, including alternatives and no treatment.     Electronically signed by JOSE Cai CNP on 2/17/2020 at 12:12 PM

## 2020-02-17 NOTE — GROUP NOTE
Group Therapy Note    Date: 2/17/2020    Group Start Time: 1300  Group End Time: 1181    Number of Participants: 5/7    Type: Exercise/Recreation Group    Group Topic/Objective: Balloon Noodle Toss    Notes:  Pt participated actively in the group. Pt noted to be less alert than he was in previous group and required Mod prompting to focus. Pt noted to laugh and smile when engaged in the activity. Status After Intervention:  Unchanged    Participation Level:  Active Listener and Interactivel; with Mod prompting    Participation Quality: Appropriate, Attentive, Sharing and Lethargic    Speech:  normal    Thought Process/Content: Logical    Affective Functioning: Congruent    Mood: Bright    Level of consciousness:  Drowsy    Response to Learning: Able to verbalize current knowledge/experience    Endings: None Reported    Modes of Intervention: Activity and Movement    Discipline Responsible: Certified Therapeutic Recreation Specialist    Electronically signed by LUCY Winston MA on 2/17/2020 at 1:32 PM

## 2020-02-17 NOTE — BH NOTE
Patient was up in wheelchair with alarm on at start of shift. He has been pleasant and cooperative and med compliant. His blood sugar was 157 and covered per ss insulin of one unit. Vitals were 98.2-77-/65-96% on room air. Stated he has had a good day and he ate yogurt for a bedtime snack. LCTA, bowel sounds present an active in all four quadrants. He denied pain, SI, HI, AVH. Has been on call light a lot to use the urinal.  Bed alarm is on. Will continue to monitor for safety.

## 2020-02-17 NOTE — PLAN OF CARE
intensity of hallucinations  Description  Able to verbalize decrease in frequency and intensity of hallucinations  2/16/2020 2143 by Xochilt Ladd LPN  Outcome: Ongoing  2/16/2020 1515 by Gutierrez Blankenship LPN  Outcome: Ongoing  Goal: Able to verbalize reality based thinking  Description  Able to verbalize reality based thinking  2/16/2020 2143 by Xochilt Ladd LPN  Outcome: Ongoing  2/16/2020 1515 by Gutierrez Blankenship LPN  Outcome: Ongoing  Goal: Ability to achieve adequate nutritional intake will improve  Description  Ability to achieve adequate nutritional intake will improve  2/16/2020 2143 by Xochilt Ladd LPN  Outcome: Ongoing  2/16/2020 1515 by Gutierrez Blankenship LPN  Outcome: Ongoing  Goal: Ability to interact with others will improve  Description  Ability to interact with others will improve  2/16/2020 2143 by Xochilt Ladd LPN  Outcome: Ongoing  2/16/2020 1515 by Gutierrez Blankenship LPN  Outcome: Ongoing  Goal: Patient specific goal  Description  Patient specific goal  2/16/2020 2143 by Xochilt Ladd LPN  Outcome: Ongoing  2/16/2020 1515 by Gutierrez Blankenship LPN  Outcome: Ongoing     Problem: Risk for Impaired Skin Integrity  Goal: Tissue integrity - skin and mucous membranes  Description  Structural intactness and normal physiological function of skin and  mucous membranes.   2/16/2020 2143 by Xochilt Ladd LPN  Outcome: Ongoing  2/16/2020 1515 by Gutierrez Blankenship LPN  Outcome: Ongoing

## 2020-02-17 NOTE — PLAN OF CARE
Krista Johnson LPN  Outcome: Ongoing  Goal: Compliance with prescribed medication regimen will improve  Description  Compliance with prescribed medication regimen will improve  2/17/2020 1031 by Elvis Silverman RN  Outcome: Ongoing  2/16/2020 2143 by Stacy Jeong LPN  Outcome: Met This Shift  Goal: Patient specific goal  Description  Patient specific goal  2/17/2020 1031 by Elvis Silverman RN  Outcome: Ongoing  2/16/2020 2143 by Stacy Jeong LPN  Outcome: Ongoing     Problem: Risk for Impaired Skin Integrity  Goal: Tissue integrity - skin and mucous membranes  Description  Structural intactness and normal physiological function of skin and  mucous membranes.   2/17/2020 1031 by Elvis Silverman RN  Outcome: Ongoing  2/16/2020 2143 by Stacy Jeong LPN  Outcome: Ongoing     Problem: Pain:  Goal: Pain level will decrease  Description  Pain level will decrease  2/17/2020 1031 by Elvis Silverman RN  Outcome: Ongoing  2/16/2020 2143 by Stacy Jeong LPN  Outcome: Met This Shift  Goal: Control of acute pain  Description  Control of acute pain  2/17/2020 1031 by Elvis Silverman RN  Outcome: Ongoing  2/16/2020 2143 by Stacy Jeong LPN  Outcome: Met This Shift  Goal: Control of chronic pain  Description  Control of chronic pain  2/17/2020 1031 by Elvis Silverman RN  Outcome: Ongoing  2/16/2020 2143 by Stacy Jeong LPN  Outcome: Met This Shift

## 2020-02-17 NOTE — PROGRESS NOTES
Physical Therapy    Physical Therapy Treatment Note  Name: Ashley Molina MRN: [id number] :   1955   Date:  2020   Admission Date: 2020 Room:  10461046-01   Restrictions/Precautions:  Restrictions/Precautions  Restrictions/Precautions: General Precautions, Fall Risk  Required Braces or Orthoses?: No       Communication with other providers:  Recreational therapist assisted during session for p safety  Subjective:  Patient states:  \"We're going to use the walker\"  Pain:   Location, Type, Intensity (0/10 to 10/10): Does not report  Objective:    Observation:  P sitting in wc in main room and demos improved alertness and attention today. Treatment, including education/measures:  Functional mobility: Facilitated sit <> stand transfers and standing positioning/posture. P performs sit <>  Stedy pulling up to standing x 5 reps with CGA and CGA to sit with mod cues for reaching back. P with decreased control with sitting and limited ability to reach back for chair. P demos impulsivity with movements. P performs standing x 2 min x 2 bouts, 3 min and 6 min with mod verbal and tactile cues to straighten knees. P with tendency to flex R knee and requires frequent cueing. P performs 10 mini squats in standing with min A for safety. P benefits from rest breaks between standing bouts. Educated on plan to work on ambulation when stable in standing. Educated on safety and set up of transfers. P left sitting up in wc in main room. Assessment / Impression:    P demos improved posture and stability in standing compared to previous sessions. P with overall improved LE muscle activation.  Recommend continued skilled PT to address deficits and maximize functional potential.   Patient's tolerance of treatment:  good   Adverse Reaction: none  Significant change in status and impact:  Improved standing  Barriers to improvement:  Impaired cognition  Plan for Next Session:    Continue to progress standing and

## 2020-02-17 NOTE — BH NOTE
Group Therapy Note    Date: 2/16/2020  Start Time:1930  End Time:  2000  Number of Participants: 1      Type of Group: Nursing Education      Notes:  Educated on insulin    Status After Intervention:  Unchanged    Participation Level:  Active Listener and Interactive    Participation Quality: Attentive      Speech:  normal      Thought Process/Content: Logical      Affective Functioning: Flat      Mood: euthymic and pleasant      Level of consciousness:  Alert      Response to Learning: Able to verbalize current knowledge/experience      Endings: None Reported    Modes of Intervention: Support and Socialization      Discipline Responsible: Licensed Practical Nurse      Signature:  Saad Christopher LPN

## 2020-02-18 ENCOUNTER — TELEPHONE (OUTPATIENT)
Dept: FAMILY MEDICINE CLINIC | Age: 65
End: 2020-02-18

## 2020-02-18 LAB
GLUCOSE BLD-MCNC: 102 MG/DL (ref 70–99)
GLUCOSE BLD-MCNC: 103 MG/DL (ref 70–99)
GLUCOSE BLD-MCNC: 142 MG/DL (ref 70–99)
GLUCOSE BLD-MCNC: 91 MG/DL (ref 70–99)

## 2020-02-18 PROCEDURE — 80165 DIPROPYLACETIC ACID FREE: CPT

## 2020-02-18 PROCEDURE — 97116 GAIT TRAINING THERAPY: CPT

## 2020-02-18 PROCEDURE — 6370000000 HC RX 637 (ALT 250 FOR IP): Performed by: PSYCHIATRY & NEUROLOGY

## 2020-02-18 PROCEDURE — 97530 THERAPEUTIC ACTIVITIES: CPT

## 2020-02-18 PROCEDURE — 82962 GLUCOSE BLOOD TEST: CPT

## 2020-02-18 PROCEDURE — 80164 ASSAY DIPROPYLACETIC ACD TOT: CPT

## 2020-02-18 PROCEDURE — 99233 SBSQ HOSP IP/OBS HIGH 50: CPT | Performed by: NURSE PRACTITIONER

## 2020-02-18 PROCEDURE — 6370000000 HC RX 637 (ALT 250 FOR IP): Performed by: HOSPITALIST

## 2020-02-18 PROCEDURE — 36415 COLL VENOUS BLD VENIPUNCTURE: CPT

## 2020-02-18 PROCEDURE — 6370000000 HC RX 637 (ALT 250 FOR IP): Performed by: INTERNAL MEDICINE

## 2020-02-18 PROCEDURE — 1240000000 HC EMOTIONAL WELLNESS R&B

## 2020-02-18 PROCEDURE — 6370000000 HC RX 637 (ALT 250 FOR IP): Performed by: NURSE PRACTITIONER

## 2020-02-18 RX ORDER — DULOXETIN HYDROCHLORIDE 30 MG/1
90 CAPSULE, DELAYED RELEASE ORAL DAILY
Status: DISCONTINUED | OUTPATIENT
Start: 2020-02-19 | End: 2020-02-25 | Stop reason: HOSPADM

## 2020-02-18 RX ADMIN — LAMOTRIGINE 125 MG: 100 TABLET ORAL at 20:35

## 2020-02-18 RX ADMIN — BENZTROPINE MESYLATE 1 MG: 1 TABLET ORAL at 07:50

## 2020-02-18 RX ADMIN — MELATONIN TAB 3 MG 3 MG: 3 TAB at 20:34

## 2020-02-18 RX ADMIN — DULOXETINE HYDROCHLORIDE 60 MG: 30 CAPSULE, DELAYED RELEASE ORAL at 07:50

## 2020-02-18 RX ADMIN — LORAZEPAM 0.5 MG: 0.5 TABLET ORAL at 07:52

## 2020-02-18 RX ADMIN — LAMOTRIGINE 125 MG: 100 TABLET ORAL at 07:51

## 2020-02-18 RX ADMIN — LEVOTHYROXINE SODIUM 75 MCG: 0.07 TABLET ORAL at 06:10

## 2020-02-18 RX ADMIN — AMLODIPINE BESYLATE 5 MG: 5 TABLET ORAL at 07:52

## 2020-02-18 RX ADMIN — ZIPRASIDONE HYDROCHLORIDE 40 MG: 40 CAPSULE ORAL at 07:51

## 2020-02-18 RX ADMIN — BENZTROPINE MESYLATE 1 MG: 1 TABLET ORAL at 20:35

## 2020-02-18 RX ADMIN — ATORVASTATIN CALCIUM 80 MG: 40 TABLET, FILM COATED ORAL at 07:50

## 2020-02-18 RX ADMIN — LACOSAMIDE 200 MG: 100 TABLET, FILM COATED ORAL at 07:50

## 2020-02-18 RX ADMIN — LACOSAMIDE 200 MG: 100 TABLET, FILM COATED ORAL at 20:34

## 2020-02-18 RX ADMIN — DIVALPROEX SODIUM 500 MG: 500 TABLET, EXTENDED RELEASE ORAL at 20:35

## 2020-02-18 RX ADMIN — TAMSULOSIN HYDROCHLORIDE 0.4 MG: 0.4 CAPSULE ORAL at 07:51

## 2020-02-18 RX ADMIN — ACETAMINOPHEN 650 MG: 325 TABLET ORAL at 12:01

## 2020-02-18 RX ADMIN — LORAZEPAM 0.5 MG: 0.5 TABLET ORAL at 20:35

## 2020-02-18 RX ADMIN — METOPROLOL SUCCINATE 50 MG: 50 TABLET, EXTENDED RELEASE ORAL at 07:51

## 2020-02-18 RX ADMIN — ZIPRASIDONE HYDROCHLORIDE 60 MG: 20 CAPSULE ORAL at 20:34

## 2020-02-18 RX ADMIN — LISINOPRIL 10 MG: 10 TABLET ORAL at 07:51

## 2020-02-18 RX ADMIN — ASPIRIN AND DIPYRIDAMOLE 1 CAPSULE: 25; 200 CAPSULE, EXTENDED RELEASE ORAL at 20:37

## 2020-02-18 ASSESSMENT — PAIN SCALES - GENERAL
PAINLEVEL_OUTOF10: 8
PAINLEVEL_OUTOF10: 0
PAINLEVEL_OUTOF10: 3

## 2020-02-18 NOTE — TELEPHONE ENCOUNTER
TO RAHUL MEMBRENO WANTED TO LET YOU KNOW SHE RECEIVED YOUR MESSAGE THAT THE PAPER IS READY TO PICK  UP----      Rj Tesfaye WILL COME TO  THIS PAPERWORK.

## 2020-02-18 NOTE — GROUP NOTE
Group Therapy Note    Date: 2/18/2020    Group Start Time: 1300  Group End Time: 1330    Number of Participants: 6/7    Type: Exercise/Recreation Group    Group Topic/Objective: Balloon Noodle Toss    Notes:  Pt actively participated in the group. Pt noted to laugh and smile throughout the activity. Pt was also noted to cheer on and encourage peers to engage in the activity. Status After Intervention:  Improved    Participation Level:  Active Listener and Interactive    Participation Quality: Appropriate, Attentive, Sharing and Supportive    Speech:  normal    Thought Process/Content: Logical    Affective Functioning: Congruent    Mood: Bright    Level of consciousness:  Alert and Attentive    Response to Learning: Able to verbalize current knowledge/experience and Able to verbalize/acknowledge new learning    Endings: None Reported    Modes of Intervention: Activity and Movement    Discipline Responsible: Certified Therapeutic Recreation Specialist    Electronically signed by Tatum Vasquez [address] [place] [address] MA on 2/18/2020 at 2:02 PM

## 2020-02-18 NOTE — PLAN OF CARE
Problem: Falls - Risk of:  Goal: Will remain free from falls  Description  Will remain free from falls  2/18/2020 1443 by Burgess Coleen LPN  Outcome: Ongoing  2/18/2020 0148 by Pastor Connor LPN  Outcome: Ongoing  Goal: Absence of physical injury  Description  Absence of physical injury  2/18/2020 1443 by Burgess Coleen LPN  Outcome: Ongoing  2/18/2020 0148 by Pastor Connor LPN  Outcome: Ongoing     Problem: Altered Mood, Psychotic Behavior:  Goal: Able to demonstrate trust by eating, participating in treatment and following staff's direction  Description  Able to demonstrate trust by eating, participating in treatment and following staff's direction  2/18/2020 1443 by Burgess Coleen LPN  Outcome: Ongoing  2/18/2020 0148 by Pastor Connor LPN  Outcome: Ongoing  Goal: Able to verbalize decrease in frequency and intensity of hallucinations  Description  Able to verbalize decrease in frequency and intensity of hallucinations  2/18/2020 1443 by Burgess Coleen LPN  Outcome: Ongoing  2/18/2020 0148 by Pastor Connor LPN  Outcome: Ongoing  Goal: Able to verbalize reality based thinking  Description  Able to verbalize reality based thinking  2/18/2020 1443 by Burgess Coleen LPN  Outcome: Ongoing  2/18/2020 0148 by Pastor Connor LPN  Outcome: Ongoing  Goal: Absence of self-harm  Description  Absence of self-harm  2/18/2020 1443 by Burgess Coleen LPN  Outcome: Ongoing  2/18/2020 0148 by Pastor Connor LPN  Outcome: Ongoing  Goal: Ability to achieve adequate nutritional intake will improve  Description  Ability to achieve adequate nutritional intake will improve  2/18/2020 1443 by Burgess Coleen LPN  Outcome: Ongoing  2/18/2020 0148 by Pastor Connor LPN  Outcome: Ongoing  Goal: Ability to interact with others will improve  Description  Ability to interact with others will improve  2/18/2020 1443 by Burgess Coleen LPN  Outcome: Ongoing  2/18/2020 0148 by Pastor Connor LPN  Outcome: Ongoing  Goal: Compliance with prescribed medication regimen will improve  Description  Compliance with prescribed medication regimen will improve  2/18/2020 1443 by Burgess Coleen LPN  Outcome: Ongoing  2/18/2020 0148 by Pastor Connor LPN  Outcome: Ongoing  Goal: Patient specific goal  Description  Patient specific goal  2/18/2020 1443 by Burgess Coleen LPN  Outcome: Ongoing  2/18/2020 0148 by Pastor Connor LPN  Outcome: Ongoing     Problem: Risk for Impaired Skin Integrity  Goal: Tissue integrity - skin and mucous membranes  Description  Structural intactness and normal physiological function of skin and  mucous membranes.   2/18/2020 1443 by Burgess Coleen LPN  Outcome: Ongoing  2/18/2020 0148 by Pastor Connor LPN  Outcome: Ongoing     Problem: Pain:  Goal: Pain level will decrease  Description  Pain level will decrease  2/18/2020 1443 by Burgess Coleen LPN  Outcome: Ongoing  2/18/2020 0148 by Pastor Connor LPN  Outcome: Ongoing  Goal: Control of acute pain  Description  Control of acute pain  2/18/2020 1443 by Burgess Coleen LPN  Outcome: Ongoing  2/18/2020 0148 by Pastor Connor LPN  Outcome: Ongoing  Goal: Control of chronic pain  Description  Control of chronic pain  2/18/2020 1443 by Burgess Coleen LPN  Outcome: Ongoing  2/18/2020 0148 by Pastor Connor LPN  Outcome: Ongoing

## 2020-02-18 NOTE — BH NOTE
Pt alert and oriented to self, and place this shift. Some confusion is present. Pt denies SI/HI, & AVH. Pt continent of bowel & bladder this shift, used urinal. Pt transferred with 1 assist, stand pivot. pts mood has been elevated, and observed socializing with peers briefly. Bed and chair alarm on, will continue to monitor.

## 2020-02-18 NOTE — PROGRESS NOTES
Range 2/10/2020 17:30   Valproic Acid % Free Latest Ref Range: 5 - 18 % 22 (H)     No TD noted, AIMS=0    Allergies   Allergen Reactions    Zyprexa [Olanzapine] Other (See Comments)     Developed severe tardive dyskinesia       Medications Prior to Admission: ziprasidone (GEODON) 60 MG capsule, Take 1 capsule by mouth 2 times daily (with meals)  metoprolol succinate (TOPROL XL) 100 MG extended release tablet, Take 1 tablet by mouth daily  amLODIPine (NORVASC) 10 MG tablet, Take 0.5 tablets by mouth daily  levothyroxine (SYNTHROID) 75 MCG tablet, Take 1 tablet by mouth Daily  melatonin 3 MG TABS tablet, Take 3 mg by mouth daily  VIMPAT 200 MG tablet, TAKE ONE (1) TABLET BY MOUTH TWO TIMES DAILY  DULoxetine (CYMBALTA) 30 MG extended release capsule, Take 1 capsule by mouth daily  lisinopril (PRINIVIL;ZESTRIL) 10 MG tablet, Take 1 tablet by mouth daily  aspirin-dipyridamole (AGGRENOX)  MG per extended release capsule, Take 1 capsule by mouth nightly   lamoTRIgine (LAMICTAL) 100 MG tablet, Take 250 mg by mouth 2 times daily  atorvastatin (LIPITOR) 80 MG tablet, Take 1 tablet by mouth daily  famotidine (PEPCID) 20 MG tablet, Take 20 mg by mouth nightly  acetaminophen (TYLENOL) 500 MG tablet, Take 1,000 mg by mouth every 4 hours as needed for Pain or Fever  metFORMIN (GLUCOPHAGE) 500 MG tablet, Take 2 tablets by mouth 2 times daily (with meals)  loperamide (IMODIUM) 2 MG capsule, Take 2 mg by mouth every 6 hours as needed for Diarrhea     Past Medical History:   Diagnosis Date    Avascular necrosis of bone (HCC)     right hip    CAD (coronary artery disease)     Coronary atherosclerosis     Dementia     Dementia ([facility] [address]     Dizziness     cerebelar atrophy    Environmental allergies     Essential hypertension     H/O Doppler ultrasound 07/11/2016    carotid - normal    IBS (irritable bowel syndrome)     Mental disability     Recurrent falls     S/P PTCA (percutaneous transluminal coronary angioplasty) above.  The patient was encouraged to participate in groups. Medical records, Labs, Diagnotic tests reviewed  q15 min safety checks for safety  Interval History. Review current labs  Continue Depakote  mg PO QHS for mood. Continue Lamictal at reduced dose of 150 mg PO BID due to initiation of Depakote ER. Continue current medications - increase duloxetine to 90 mg daily  Supportive Therapy Provided  Pt had an opportunity to ask questions and address concerns  Pt encouraged to continue therapy group or individual.  Pt was in agreement with treatment plan. The risks benefits and side effects of medications were discussed with the patient, including alternatives and no treatment.     Electronically signed by JOSE Mcgowan CNP on 2/18/2020 at 11:57 AM

## 2020-02-18 NOTE — GROUP NOTE
Group Therapy Note    Date: 2/18/2020    Group Start Time: 1500  Group End Time: 0366  Group Topic: Psychoeducation    [address]    LUCY Moran        Group Therapy Note    Attendees: 4/7      Notes:  Pts participated in recreational therapy group; Relaxation Scripts. Pts engaged in three different types of relaxation scripts; Deep Breathing, Guided Imagery, and Progressive Muscle Relaxation. After completion of each script, pts discussed their thoughts on the what they had just completed. Pt participated in ~10 minutes of group before being called out by PT and OT.     Status After Intervention:  Unchanged    Participation Level: Minimal    Participation Quality: Attentive      Speech:  normal      Thought Process/Content: Logical      Affective Functioning: Blunted      Mood: Blunted      Level of consciousness:  Alert and Attentive      Response to Learning: Able to verbalize current knowledge/experience      Endings: None Reported    Modes of Intervention: Education, Support, Exploration and Activity      Discipline Responsible: Certified Therapeutic Recreation Specialist      Signature:  Jeff Macias, [address] [place] [address]

## 2020-02-18 NOTE — PROGRESS NOTES
Physical Therapy    Physical Therapy Treatment Note  Name: Bandar Weinstein MRN: [id number] :   1955   Date:  2020   Admission Date: 2020 Room:  1046/1046-01   Restrictions/Precautions:  Restrictions/Precautions  Restrictions/Precautions: General Precautions, Fall Risk  Required Braces or Orthoses?: No       Communication with other providers:  Co-treat with OT, Nursing aware therapy is working with patient  Subjective:  Patient states:  \" I need the one with one wheel not two\"  Pain:   Location, Type, Intensity (0/10 to 10/10): Does not report  Objective:    Observation:  P sitting up in wc. Treatment, including education/measures:  Transfers: P performs sit <> stand from wc to RW with max cues and min A for hand placement. P requires CGA to min A for safety. Gait: P ambulates with RW x 46', 60', 120' with min A for balance. P with difficulty maintaining midline requiring max cues for alignment. P with decreased stability with shift requiring min A to correct. P with decreased step length on LLE, P with decreased safety awareness and releases UE support in standing x 3 reps requiring mod A. P easily distracted by clothing management and is impulsive. Educated on safety with transfers and gait. P left sitting up in chair with chair alarm on in main room. Assessment / Impression:    P demos significantly improved mobility and improved balance. P able to ambulate with min A and wc follow for safety.  Recommend continued skilled PT to address deficits and maximize functional potential.   Patient's tolerance of treatment:  good   Adverse Reaction: none  Significant change in status and impact:  Improved gait  Barriers to improvement:  Attention, impulsivity  Plan for Next Session:    Continue to work on gait and balance  Time in:  1512  Time out:  1540  Timed treatment minutes:  28  Total treatment time:  28    Previously filed items:  Social/Functional History  Lives With: Other (comment)(Group home)  Type of Home: Facility(group home)  Home Layout: One level  Home Access: Level entry  ADL Assistance: Independent  Homemaking Assistance: Needs assistance  Homemaking Responsibilities: (Meals are brought to Pt at home)  Ambulation Assistance: Independent  Transfer Assistance: Independent  Active : No  Type of occupation: works at [employer]  Additional Comments: Pt unable to provide history. Information obtained per report in chart. Per nurse Pt was living with a roommate at a group home and had meals brought in but was indep with ADLs and he went to work at [employer] every day. Reports Pt had a seizure and since then he hasn't been able to walk or move his legs  Short term goals  Time Frame for Short term goals: 1 week  Short term goal 1: P will perform sit <> stand to LRAD with mod A. Short term goal 2: P will perform bed mobility with mod A. Short term goal 3: P will perform stand step transfer to LRAD with mod A.        Electronically signed by:    Christoph Floyd, PT  2/18/2020, 4:11 PM

## 2020-02-18 NOTE — PROGRESS NOTES
Occupational Therapy    Occupational Therapy Treatment Note  Name: Dola Cowden MRN: [id number] :   1955   Date:  2020   Admission Date: 2020 Room:  1046/1046-01   Restrictions/Precautions:  Restrictions/Precautions  Restrictions/Precautions: General Precautions, Fall Risk  Required Braces or Orthoses?: No   Communication with other providers:   Cleared for treatment by RN, co-treat with PT      Subjective:  Patient states:  Pt agreeable to complete therapy  Pain:   Location, Type, Intensity (0/10 to 10/10): None rated    Objective:    Observation: Pt alert and appears excited to practice walking. Treatment, including education:  Pt performed sit to stand from wheelchair using rolling walker with min A, Max cues for hand placement. Pt ambulated house hold distance x 4 for increased functional mobility, endurance, balance, and standing tolerance needed for ADL tasks. Pt requires multiple cues throughout session for safety. Pt given cues during walking breaks for energy conservation, educated on locking wheelchair before standing. Pt instructed pt on correct direction to push handle to lock breaks and pt demonstrated ability to do so. Pt continued to need cues to lock breaks before standing following demonstration. Assessment / Impression:        Patient's tolerance of treatment: Fair   Adverse Reaction: None  Significant change in status and impact:  None  Barriers to improvement:  Decreased strength and endurance, decreased safety awareness. Plan for Next Session:    Continue with POC. Time in:  1512  Time out: 1540  Timed treatment minutes: 28   Total treatment time:  28  Electronically signed by:    DARIO Hood OTR/L    2020, 4:29 PM    Previously filed values:     Short term goals  Time Frame for Short term goals: until DC or goals met  Short term goal 1: Pt will complete transfers mod . I (bed, chair, toilet)  Short term goal 2: Pt will complete UE/LE bathing mod I  Short term goal 3: Pt will complete UE/LE dressing mod I  Short term goal 4: Pt will complete toileting mod I  Short term goal 5: Pt will demo 3+ min of standing/functional mobility for ALDs mod I

## 2020-02-18 NOTE — CARE COORDINATION
12:30pm Cherri Or RISHI from [facility] came to assess patient today for PAS[facility]. She stated that someone from [facility] will also come out and assess patient. 1:15pm Left message for Kayla Maria ([phone]) at [facility] to follow up on referral that was sent. Awaiting call back. 1:50pm Received a call from Kayla Maria. [facility] is unable to accept any referrals at this time. 2:50pm- Left message for Admissions at [facility] ([phone]) to inquire about bed availability. Awaiting a call back.

## 2020-02-18 NOTE — PROGRESS NOTES
Patient has been alert and cooperative all of shift. Patient has shown increased cognition, increased physical ability and has been talking more clearly. Patient denies any SI/HI or AVH as well as no depression or anxiety.

## 2020-02-19 PROBLEM — G93.41 ACUTE METABOLIC ENCEPHALOPATHY: Status: RESOLVED | Noted: 2018-02-20 | Resolved: 2020-02-19

## 2020-02-19 LAB
ALBUMIN SERPL-MCNC: 4.2 GM/DL (ref 3.4–5)
ALP BLD-CCNC: 42 IU/L (ref 40–129)
ALT SERPL-CCNC: 50 U/L (ref 10–40)
ANION GAP SERPL CALCULATED.3IONS-SCNC: 10 MMOL/L (ref 4–16)
AST SERPL-CCNC: 39 IU/L (ref 15–37)
BILIRUB SERPL-MCNC: 0.2 MG/DL (ref 0–1)
BUN BLDV-MCNC: 10 MG/DL (ref 6–23)
CALCIUM SERPL-MCNC: 9.7 MG/DL (ref 8.3–10.6)
CHLORIDE BLD-SCNC: 99 MMOL/L (ref 99–110)
CO2: 30 MMOL/L (ref 21–32)
CREAT SERPL-MCNC: 1.2 MG/DL (ref 0.9–1.3)
DOSE AMOUNT: ABNORMAL
DOSE TIME: ABNORMAL
GFR AFRICAN AMERICAN: >60 ML/MIN/1.73M2
GFR NON-AFRICAN AMERICAN: >60 ML/MIN/1.73M2
GLUCOSE BLD-MCNC: 109 MG/DL (ref 70–99)
GLUCOSE BLD-MCNC: 159 MG/DL (ref 70–99)
GLUCOSE BLD-MCNC: 162 MG/DL (ref 70–99)
GLUCOSE BLD-MCNC: 170 MG/DL (ref 70–99)
GLUCOSE BLD-MCNC: 84 MG/DL (ref 70–99)
GLUCOSE BLD-MCNC: 97 MG/DL (ref 70–99)
POTASSIUM SERPL-SCNC: 4.2 MMOL/L (ref 3.5–5.1)
SODIUM BLD-SCNC: 139 MMOL/L (ref 135–145)
TOTAL PROTEIN: 7.2 GM/DL (ref 6.4–8.2)
VALPROIC ACID LEVEL: 34.5 UG/ML (ref 50–100)

## 2020-02-19 PROCEDURE — 97530 THERAPEUTIC ACTIVITIES: CPT

## 2020-02-19 PROCEDURE — 82962 GLUCOSE BLOOD TEST: CPT

## 2020-02-19 PROCEDURE — 99232 SBSQ HOSP IP/OBS MODERATE 35: CPT | Performed by: PSYCHIATRY & NEUROLOGY

## 2020-02-19 PROCEDURE — 6370000000 HC RX 637 (ALT 250 FOR IP): Performed by: PSYCHIATRY & NEUROLOGY

## 2020-02-19 PROCEDURE — 80053 COMPREHEN METABOLIC PANEL: CPT

## 2020-02-19 PROCEDURE — 80164 ASSAY DIPROPYLACETIC ACD TOT: CPT

## 2020-02-19 PROCEDURE — 97116 GAIT TRAINING THERAPY: CPT

## 2020-02-19 PROCEDURE — 6370000000 HC RX 637 (ALT 250 FOR IP): Performed by: INTERNAL MEDICINE

## 2020-02-19 PROCEDURE — 6370000000 HC RX 637 (ALT 250 FOR IP): Performed by: NURSE PRACTITIONER

## 2020-02-19 PROCEDURE — 36415 COLL VENOUS BLD VENIPUNCTURE: CPT

## 2020-02-19 PROCEDURE — 6370000000 HC RX 637 (ALT 250 FOR IP): Performed by: HOSPITALIST

## 2020-02-19 PROCEDURE — 1240000000 HC EMOTIONAL WELLNESS R&B

## 2020-02-19 RX ORDER — ZIPRASIDONE HYDROCHLORIDE 20 MG/1
20 CAPSULE ORAL
Status: DISCONTINUED | OUTPATIENT
Start: 2020-02-20 | End: 2020-02-20 | Stop reason: SDUPTHER

## 2020-02-19 RX ADMIN — DULOXETINE HYDROCHLORIDE 90 MG: 30 CAPSULE, DELAYED RELEASE ORAL at 08:22

## 2020-02-19 RX ADMIN — ZIPRASIDONE HYDROCHLORIDE 60 MG: 20 CAPSULE ORAL at 20:18

## 2020-02-19 RX ADMIN — LORAZEPAM 0.5 MG: 0.5 TABLET ORAL at 08:23

## 2020-02-19 RX ADMIN — LISINOPRIL 10 MG: 10 TABLET ORAL at 08:24

## 2020-02-19 RX ADMIN — ZIPRASIDONE HYDROCHLORIDE 40 MG: 40 CAPSULE ORAL at 08:25

## 2020-02-19 RX ADMIN — LAMOTRIGINE 125 MG: 100 TABLET ORAL at 20:13

## 2020-02-19 RX ADMIN — ASPIRIN AND DIPYRIDAMOLE 1 CAPSULE: 25; 200 CAPSULE, EXTENDED RELEASE ORAL at 20:14

## 2020-02-19 RX ADMIN — LACOSAMIDE 200 MG: 100 TABLET, FILM COATED ORAL at 20:13

## 2020-02-19 RX ADMIN — DIVALPROEX SODIUM 500 MG: 500 TABLET, EXTENDED RELEASE ORAL at 20:13

## 2020-02-19 RX ADMIN — BENZTROPINE MESYLATE 1 MG: 1 TABLET ORAL at 20:13

## 2020-02-19 RX ADMIN — LAMOTRIGINE 125 MG: 100 TABLET ORAL at 08:22

## 2020-02-19 RX ADMIN — ATORVASTATIN CALCIUM 80 MG: 40 TABLET, FILM COATED ORAL at 08:24

## 2020-02-19 RX ADMIN — AMLODIPINE BESYLATE 5 MG: 5 TABLET ORAL at 08:23

## 2020-02-19 RX ADMIN — LACOSAMIDE 200 MG: 100 TABLET, FILM COATED ORAL at 08:24

## 2020-02-19 RX ADMIN — LEVOTHYROXINE SODIUM 75 MCG: 0.07 TABLET ORAL at 06:36

## 2020-02-19 RX ADMIN — LORAZEPAM 0.5 MG: 0.5 TABLET ORAL at 20:13

## 2020-02-19 RX ADMIN — TAMSULOSIN HYDROCHLORIDE 0.4 MG: 0.4 CAPSULE ORAL at 08:22

## 2020-02-19 RX ADMIN — METOPROLOL SUCCINATE 50 MG: 50 TABLET, EXTENDED RELEASE ORAL at 08:22

## 2020-02-19 RX ADMIN — BENZTROPINE MESYLATE 1 MG: 1 TABLET ORAL at 08:24

## 2020-02-19 RX ADMIN — MELATONIN TAB 3 MG 3 MG: 3 TAB at 20:13

## 2020-02-19 NOTE — PROGRESS NOTES
transfers and positioning but demonstrates limited carryover at this time. P left sitting up in wc with chair alarm on and nursing aware. Assessment / Impression:    P requires mod A for transfers at this time with decreased safety and retropulsion. P demos significant gait and balance impairments. P with reduced ambulation compared to previous session. Recommend continued skilled PT to address deficits and maximize functional potential.  Patient's tolerance of treatment:  fair   Adverse Reaction: decreased balance  Significant change in status and impact:  Decreased gait  Barriers to improvement:  Attention, impaired command following  Plan for Next Session:    Continue to work on transfers, gait and balance  Time in:  1442  Time out:  1506  Timed treatment minutes:  24  Total treatment time:  24    Previously filed items:  Social/Functional History  Lives With: Other (comment)(Group home)  Type of Home: Facility(group home)  Home Layout: One level  Home Access: Level entry  ADL Assistance: Independent  Homemaking Assistance: Needs assistance  Homemaking Responsibilities: (Meals are brought to Pt at home)  Ambulation Assistance: Independent  Transfer Assistance: Independent  Active : No  Type of occupation: works at [employer]  Additional Comments: Pt unable to provide history. Information obtained per report in chart. Per nurse Pt was living with a roommate at a group home and had meals brought in but was indep with ADLs and he went to work at [employer] every day. Reports Pt had a seizure and since then he hasn't been able to walk or move his legs  Short term goals  Time Frame for Short term goals: 1 week  Short term goal 1: P will perform sit <> stand to LRAD with mod A. Short term goal 2: P will perform bed mobility with mod A. Short term goal 3: P will perform stand step transfer to LRAD with mod A.        Electronically signed by:    Neptali Rush PT  2/19/2020, 4:09 PM

## 2020-02-19 NOTE — PLAN OF CARE
Problem: Falls - Risk of:  Goal: Will remain free from falls  Description  Will remain free from falls  2/19/2020 0039 by Royal Adam RN  Outcome: Ongoing  2/19/2020 0018 by Royal Adam RN  Outcome: Ongoing  2/18/2020 1443 by Chayito Quinn LPN  Outcome: Ongoing  Goal: Absence of physical injury  Description  Absence of physical injury  2/19/2020 0039 by Royal Adam RN  Outcome: Ongoing  2/19/2020 0018 by Royal Adam RN  Outcome: Ongoing  2/18/2020 1443 by Chayito Quinn LPN  Outcome: Ongoing     Problem: Altered Mood, Psychotic Behavior:  Goal: Able to demonstrate trust by eating, participating in treatment and following staff's direction  Description  Able to demonstrate trust by eating, participating in treatment and following staff's direction  2/19/2020 0039 by Royal Adam RN  Outcome: Ongoing  2/19/2020 0018 by Royal Adam RN  Outcome: Ongoing  2/18/2020 1443 by Chayito Quinn LPN  Outcome: Ongoing  Goal: Able to verbalize decrease in frequency and intensity of hallucinations  Description  Able to verbalize decrease in frequency and intensity of hallucinations  2/19/2020 0039 by Royal Adam RN  Outcome: Ongoing  2/19/2020 0018 by Royal Adam RN  Outcome: Ongoing  2/18/2020 1443 by Chayito Quinn LPN  Outcome: Ongoing  Goal: Able to verbalize reality based thinking  Description  Able to verbalize reality based thinking  2/19/2020 0039 by Royal Adam RN  Outcome: Ongoing  2/19/2020 0018 by Royal Adam RN  Outcome: Ongoing  2/18/2020 1443 by Chayito Quinn LPN  Outcome: Ongoing  Goal: Absence of self-harm  Description  Absence of self-harm  2/19/2020 0039 by Royal Adam RN  Outcome: Ongoing  2/19/2020 0018 by Royal Adam RN  Outcome: Ongoing  2/18/2020 1443 by Chayito Quinn LPN  Outcome: Ongoing  Goal: Ability to achieve adequate nutritional intake will improve  Description  Ability to achieve adequate nutritional intake will improve  2/19/2020 0039 RN  Outcome: Ongoing  2/19/2020 0018 by Sumeet Toure RN  Outcome: Ongoing  2/18/2020 1443 by Andre Whatley LPN  Outcome: Ongoing

## 2020-02-19 NOTE — PROGRESS NOTES
Therapist complete 1:1 session with pt from approximately [id number]. Pt requesting to engage in Balloon Noodle activity. Pt noted to laugh and smile throughout the activity. Pt noted to have more hand/eye coordination AEB pt required less attempts to make contact with the balloon. Pt also noted to be able to  balloon when it fell to the floor which in previous groups, pt has not been able to complete this action.      Electronically signed by Saba Worley MA on 2/19/2020 at 11:07 AM

## 2020-02-19 NOTE — PROGRESS NOTES
Psychiatric Progress Note    Maryse Stallowrth  [id number]  02/19/20    CHIEF COMPLAINT: unchanged    NOTE: pt has had no hx of withdrawal or substance abuse this admission pt only needed stabilization and medication titration for schizophrenia. Pt is very polite and cordial throught out stay always medication compliant and courteous to staff and other pt's insight and stabilization improving daily. HPI: Maryse Stallworth  is a 61 yo On license of [facility] American male who presents for exacerbation of schizophrenia with suicidal ideation and depression severe. Pt noted recent exacerbation of psychosis and mood. Pt noted he currently feels safe and comfortable on the unit. Pt was in agreement with treatment team.  Pt was polite and cordial during the interview process. Pt agreed to start Depakote requiring Lamictal to be cut in half due to medication titration. Pt was in agreement.     Patient was very sleepy today during the interview. Pt noted he is doing [place] today. \"  Pt noted he is sleeping \"okay. \"  Pt noted his appetite is \"alright. \"  Pt noted a reduction in both depression and anxiety pt noted they were \"both very low today. \" Pt  Denied any thoughts to harm himself or anyone else. Pt noted occasional auditory and visual hallucinations yet reduced siginifcantly. Review of the chart notes patient participating in Psychotherapy groups. Physical and Occupational Therapy continue to work with the patient. SW is working on obtaining placement for the patient once ready for discharge. AIMS=1, pt currently noted he did not need medications for the EPS symptoms  NO TD noted    Lamotrigine level normal, valproic acid level free and % free slightly elevated, but given that Depakote recently decreased from 1,000 mg to 500 mg daily do not anticipate any further rise in these levels. Results for Tammie Du (MRN [id number]) as of 2/13/2020 12:33   Ref.  Range 2/10/2020 17:30   Lamotrigine Lvl Latest Ref Range: 2.5 - 15.0 hip    CAD (coronary artery disease)     Coronary atherosclerosis     Dementia     Dementia (HCC)     Dizziness     cerebelar atrophy    Environmental allergies     Essential hypertension     H/O Doppler ultrasound 07/11/2016    carotid - normal    IBS (irritable bowel syndrome)     Mental disability     Recurrent falls     S/P PTCA (percutaneous transluminal coronary angioplasty) 06/16/2016    Cx OM stent    Seizure disorder (HCC)     Urge incontinence of urine     Wrist joint pain     chronic left        Patient Active Problem List   Diagnosis    Obsessive-compulsive disorder    IBS (irritable bowel syndrome)    Mental disability    Uncontrolled type 2 diabetes mellitus with diabetic polyneuropathy, without long-term current use of insulin (HCC)    Coronary artery disease involving native coronary artery of native heart with unstable angina pectoris (HCC)    Essential hypertension    Dyslipidemia    Ataxia    Late effects of CVA (cerebrovascular accident)    Chronic idiopathic constipation    Seizure disorder ([address]    Peripheral polyneuropathy    Hypoglycemia associated with diabetes ([address]    Dysmetria    Coronary atherosclerosis    Urge incontinence of urine    Dementia (HCC)    Recurrent falls    Wrist joint pain    Avascular necrosis of bone (HCC)    AMS (altered mental status)    Acute exacerbation of chronic schizophrenia (HCC)    Schizophrenia ([address]       Review of Systems    OBJECTIVE  Vital Signs:  Vitals:    02/19/20 0720   BP: (!) 115/53   Pulse: 74   Resp: 16   Temp: 97.3 °F (36.3 °C)   SpO2: 97%       Labs:  Recent Results (from the past 48 hour(s))   POCT Glucose    Collection Time: 02/17/20  4:47 PM   Result Value Ref Range    POC Glucose 105 (H) 70 - 99 MG/DL   POCT Glucose    Collection Time: 02/17/20  7:55 PM   Result Value Ref Range    POC Glucose 157 (H) 70 - 99 MG/DL   POCT Glucose    Collection Time: 02/18/20  7:27 AM   Result Value Ref Range    POC PLAN:  Psychiatric management:medication initiation and titration, group and individual therapy, safe and therapeutic environment. Status of problem/condition: Improving  Medical co-morbidities: Management per [facility] group, appreciate assistance  Legal Status:Pending  Disposition:estimated LOS: Pending. The treatment team reviewed with the patient the diagnosis and treatment recommendations to include the risks, benefits, and side effects of chosen medications. The patient verbalized understanding and agreed with the treatment regimen as outlined above. The patient was encouraged to participate in groups. Medical records, Labs, Diagnotic tests reviewed  q15 min safety checks for safety  Interval History. Review current labs  Continue Depakote  mg PO QHS for mood. Reduce geodon to 20 mg QAM and continue 60 mg QHS for mood  Continue Lamictal at reduced dose of 150 mg PO BID due to initiation of Depakote ER. Continue current medications - decrease ziprasidone to 40 mg QAM and 60 mg HS for AVH  Supportive Therapy Provided  Pt had an opportunity to ask questions and address concerns  Pt encouraged to continue therapy group or individual.  Pt was in agreement with treatment plan. The risks benefits and side effects of medications were discussed with the patient, including alternatives and no treatment.     Electronically signed by Deneen West DO on 2/19/2020 at 2:15 PM

## 2020-02-19 NOTE — GROUP NOTE
Group Therapy Note    Date: 2/19/2020    Group Start Time: 0830  Group End Time: 0900    Number of Participants: 4/6    Type: Morning Goals Group/ Community Meeting    Group Topic/Objective: Set Goal For The Day and to review Unit Rules and Regulations. Patient's Goal:  Pt expressed desire to walk with physical therapy. Notes:  Pt presented as pleasant and cooperative during group. Pt noted to be more alert than he has in previous groups. Pt laughed and joked with peers. Pt accepted redirection well when pt attempted to stand, but immediately sat back down when prompted. Depression (0-10): 0    Anxiety (0-10): 0    Irritability/Aggitation (0-10): 0    Status After Intervention:  Improved    Participation Level:  Active Listener and Interactive    Participation Quality: Appropriate, Attentive and Sharing    Speech:  normal    Thought Process/Content: Logical    Affective Functioning: Congruent    Mood: Bright    Level of consciousness:  Alert and Attentive    Response to Learning: Able to verbalize current knowledge/experience and Able to verbalize/acknowledge new learning    Endings: None Reported    Modes of Intervention: Education, Support and Socialization    Discipline Responsible: Certified Therapeutic Recreation Specialist    Electronically signed by Valdo Roblero MA on 2/19/2020 at 9:09 AM

## 2020-02-19 NOTE — PLAN OF CARE
Problem: Falls - Risk of:  Goal: Will remain free from falls  Description  Will remain free from falls  2/19/2020 0018 by Dewayne Juarez RN  Outcome: Ongoing  2/18/2020 1443 by Dimple Rodriguez LPN  Outcome: Ongoing  Goal: Absence of physical injury  Description  Absence of physical injury  2/19/2020 0018 by Dewayne Juarez RN  Outcome: Ongoing  2/18/2020 1443 by Dimple Rodriguez LPN  Outcome: Ongoing     Problem: Altered Mood, Psychotic Behavior:  Goal: Able to demonstrate trust by eating, participating in treatment and following staff's direction  Description  Able to demonstrate trust by eating, participating in treatment and following staff's direction  2/19/2020 0018 by Dewayne Juarez RN  Outcome: Ongoing  2/18/2020 1443 by Dimple Rodriguez LPN  Outcome: Ongoing  Goal: Able to verbalize decrease in frequency and intensity of hallucinations  Description  Able to verbalize decrease in frequency and intensity of hallucinations  2/19/2020 0018 by Dewayne Juarez RN  Outcome: Ongoing  2/18/2020 1443 by Dimple Rodriguez LPN  Outcome: Ongoing  Goal: Able to verbalize reality based thinking  Description  Able to verbalize reality based thinking  2/19/2020 0018 by Dewayne Juarez RN  Outcome: Ongoing  2/18/2020 1443 by Dimple Rodriguez LPN  Outcome: Ongoing  Goal: Absence of self-harm  Description  Absence of self-harm  2/19/2020 0018 by Dewayne Juarez RN  Outcome: Ongoing  2/18/2020 1443 by Dimple Rodriguez LPN  Outcome: Ongoing  Goal: Ability to achieve adequate nutritional intake will improve  Description  Ability to achieve adequate nutritional intake will improve  2/19/2020 0018 by Dewayne Juarez RN  Outcome: Ongoing  2/18/2020 1443 by Dimple Rodriguez LPN  Outcome: Ongoing  Goal: Ability to interact with others will improve  Description  Ability to interact with others will improve  2/19/2020 0018 by Dewayne Juarez RN  Outcome: Ongoing  2/18/2020 1443 by Dimple Rodriguez LPN  Outcome: Ongoing  Goal: Compliance with

## 2020-02-19 NOTE — PROGRESS NOTES
angioplasty) 06/16/2016    Cx OM stent    Seizure disorder ([address]     Urge incontinence of urine     Wrist joint pain     chronic left        Patient Active Problem List   Diagnosis    Obsessive-compulsive disorder    IBS (irritable bowel syndrome)    Mental disability    Uncontrolled type 2 diabetes mellitus with diabetic polyneuropathy, without long-term current use of insulin ([address]    Coronary artery disease involving native coronary artery of native heart with unstable angina pectoris ([address]    Essential hypertension    Dyslipidemia    Ataxia    Late effects of CVA (cerebrovascular accident)    Chronic idiopathic constipation    Seizure disorder (HCC)    Peripheral polyneuropathy    Hypoglycemia associated with diabetes ([address]    Dysmetria    Coronary atherosclerosis    Urge incontinence of urine    Dementia ([address]    Recurrent falls    Wrist joint pain    Avascular necrosis of bone ([address]    AMS (altered mental status)    Acute exacerbation of chronic schizophrenia ([address]    Schizophrenia ([address]       Review of Systems    OBJECTIVE  Vital Signs:  Vitals:    02/19/20 0720   BP: (!) 115/53   Pulse: 74   Resp: 16   Temp: 97.3 °F (36.3 °C)   SpO2: 97%       Labs:  Recent Results (from the past 48 hour(s))   POCT Glucose    Collection Time: 02/17/20 11:51 AM   Result Value Ref Range    POC Glucose 104 (H) 70 - 99 MG/DL   POCT Glucose    Collection Time: 02/17/20  4:47 PM   Result Value Ref Range    POC Glucose 105 (H) 70 - 99 MG/DL   POCT Glucose    Collection Time: 02/17/20  7:55 PM   Result Value Ref Range    POC Glucose 157 (H) 70 - 99 MG/DL   POCT Glucose    Collection Time: 02/18/20  7:27 AM   Result Value Ref Range    POC Glucose 103 (H) 70 - 99 MG/DL   POCT Glucose    Collection Time: 02/18/20 11:59 AM   Result Value Ref Range    POC Glucose 91 70 - 99 MG/DL   POCT Glucose    Collection Time: 02/18/20  4:59 PM   Result Value Ref Range    POC Glucose 142 (H) 70 - 99 MG/DL   POCT Glucose

## 2020-02-20 LAB
GLUCOSE BLD-MCNC: 106 MG/DL (ref 70–99)
GLUCOSE BLD-MCNC: 153 MG/DL (ref 70–99)
GLUCOSE BLD-MCNC: 85 MG/DL (ref 70–99)
VALPROIC ACID % FREE: ABNORMAL % (ref 5–18)
VALPROIC ACID % FREE: ABNORMAL % (ref 5–18)
VALPROIC ACID TOTAL: 28 UG/ML (ref 50–125)
VALPROIC ACID, FREE: <7 UG/ML (ref 7–23)

## 2020-02-20 PROCEDURE — 99233 SBSQ HOSP IP/OBS HIGH 50: CPT | Performed by: NURSE PRACTITIONER

## 2020-02-20 PROCEDURE — 1240000000 HC EMOTIONAL WELLNESS R&B

## 2020-02-20 PROCEDURE — 6370000000 HC RX 637 (ALT 250 FOR IP): Performed by: HOSPITALIST

## 2020-02-20 PROCEDURE — 97530 THERAPEUTIC ACTIVITIES: CPT

## 2020-02-20 PROCEDURE — 97116 GAIT TRAINING THERAPY: CPT

## 2020-02-20 PROCEDURE — 6370000000 HC RX 637 (ALT 250 FOR IP): Performed by: INTERNAL MEDICINE

## 2020-02-20 PROCEDURE — 6370000000 HC RX 637 (ALT 250 FOR IP): Performed by: PSYCHIATRY & NEUROLOGY

## 2020-02-20 PROCEDURE — 6370000000 HC RX 637 (ALT 250 FOR IP): Performed by: NURSE PRACTITIONER

## 2020-02-20 PROCEDURE — 82962 GLUCOSE BLOOD TEST: CPT

## 2020-02-20 RX ORDER — ZIPRASIDONE HYDROCHLORIDE 20 MG/1
60 CAPSULE ORAL NIGHTLY
Status: DISCONTINUED | OUTPATIENT
Start: 2020-02-20 | End: 2020-02-25 | Stop reason: HOSPADM

## 2020-02-20 RX ORDER — ZIPRASIDONE HYDROCHLORIDE 20 MG/1
20 CAPSULE ORAL
Status: DISCONTINUED | OUTPATIENT
Start: 2020-02-21 | End: 2020-02-25 | Stop reason: HOSPADM

## 2020-02-20 RX ADMIN — ASPIRIN AND DIPYRIDAMOLE 1 CAPSULE: 25; 200 CAPSULE, EXTENDED RELEASE ORAL at 20:52

## 2020-02-20 RX ADMIN — ATORVASTATIN CALCIUM 80 MG: 40 TABLET, FILM COATED ORAL at 08:25

## 2020-02-20 RX ADMIN — LORAZEPAM 0.5 MG: 0.5 TABLET ORAL at 20:50

## 2020-02-20 RX ADMIN — ZIPRASIDONE HYDROCHLORIDE 60 MG: 20 CAPSULE ORAL at 21:29

## 2020-02-20 RX ADMIN — BENZTROPINE MESYLATE 1 MG: 1 TABLET ORAL at 20:51

## 2020-02-20 RX ADMIN — METOPROLOL SUCCINATE 50 MG: 50 TABLET, EXTENDED RELEASE ORAL at 08:25

## 2020-02-20 RX ADMIN — BENZTROPINE MESYLATE 1 MG: 1 TABLET ORAL at 08:25

## 2020-02-20 RX ADMIN — ERGOCALCIFEROL 50000 UNITS: 1.25 CAPSULE ORAL at 08:24

## 2020-02-20 RX ADMIN — LAMOTRIGINE 125 MG: 100 TABLET ORAL at 20:50

## 2020-02-20 RX ADMIN — DULOXETINE HYDROCHLORIDE 90 MG: 30 CAPSULE, DELAYED RELEASE ORAL at 08:24

## 2020-02-20 RX ADMIN — LEVOTHYROXINE SODIUM 75 MCG: 0.07 TABLET ORAL at 06:24

## 2020-02-20 RX ADMIN — LISINOPRIL 10 MG: 10 TABLET ORAL at 08:25

## 2020-02-20 RX ADMIN — LAMOTRIGINE 125 MG: 100 TABLET ORAL at 08:24

## 2020-02-20 RX ADMIN — TAMSULOSIN HYDROCHLORIDE 0.4 MG: 0.4 CAPSULE ORAL at 08:24

## 2020-02-20 RX ADMIN — LACOSAMIDE 200 MG: 100 TABLET, FILM COATED ORAL at 08:25

## 2020-02-20 RX ADMIN — ZIPRASIDONE HYDROCHLORIDE 20 MG: 20 CAPSULE ORAL at 08:24

## 2020-02-20 RX ADMIN — DIVALPROEX SODIUM 500 MG: 500 TABLET, EXTENDED RELEASE ORAL at 20:51

## 2020-02-20 RX ADMIN — LORAZEPAM 0.5 MG: 0.5 TABLET ORAL at 08:25

## 2020-02-20 RX ADMIN — MELATONIN TAB 3 MG 3 MG: 3 TAB at 20:51

## 2020-02-20 RX ADMIN — AMLODIPINE BESYLATE 5 MG: 5 TABLET ORAL at 08:25

## 2020-02-20 ASSESSMENT — PAIN SCALES - WONG BAKER: WONGBAKER_NUMERICALRESPONSE: 0

## 2020-02-20 ASSESSMENT — PAIN SCALES - GENERAL
PAINLEVEL_OUTOF10: 0

## 2020-02-20 NOTE — PROGRESS NOTES
tablet, TAKE ONE (1) TABLET BY MOUTH TWO TIMES DAILY  DULoxetine (CYMBALTA) 30 MG extended release capsule, Take 1 capsule by mouth daily  lisinopril (PRINIVIL;ZESTRIL) 10 MG tablet, Take 1 tablet by mouth daily  aspirin-dipyridamole (AGGRENOX)  MG per extended release capsule, Take 1 capsule by mouth nightly   lamoTRIgine (LAMICTAL) 100 MG tablet, Take 250 mg by mouth 2 times daily  atorvastatin (LIPITOR) 80 MG tablet, Take 1 tablet by mouth daily  famotidine (PEPCID) 20 MG tablet, Take 20 mg by mouth nightly  acetaminophen (TYLENOL) 500 MG tablet, Take 1,000 mg by mouth every 4 hours as needed for Pain or Fever  metFORMIN (GLUCOPHAGE) 500 MG tablet, Take 2 tablets by mouth 2 times daily (with meals)  loperamide (IMODIUM) 2 MG capsule, Take 2 mg by mouth every 6 hours as needed for Diarrhea     Past Medical History:   Diagnosis Date    Avascular necrosis of bone (HCC)     right hip    CAD (coronary artery disease)     Coronary atherosclerosis     Dementia     [facility] ([facility])     Dizziness     cerebelar atrophy    Environmental allergies     Essential hypertension     H/O Doppler ultrasound 07/11/2016    carotid - normal    IBS (irritable bowel syndrome)     Mental disability     Recurrent falls     S/P PTCA (percutaneous transluminal coronary angioplasty) 06/16/2016    Cx OM stent    Seizure disorder ([facility])     Urge incontinence of urine     Wrist joint pain     chronic left        Patient Active Problem List   Diagnosis    Obsessive-compulsive disorder    IBS (irritable bowel syndrome)    Mental disability    Uncontrolled type 2 diabetes mellitus with diabetic polyneuropathy, without long-term current use of insulin (HCC)    Coronary artery disease involving native coronary artery of native heart with unstable angina pectoris (HCC)    Essential hypertension    Dyslipidemia    Ataxia    Late effects of CVA (cerebrovascular accident)    Chronic idiopathic constipation    Seizure mL/min/1.73m2    GFR African American >60 >60 mL/min/1.73m2    Anion Gap 10 4 - 16   POCT Glucose    Collection Time: 02/19/20  4:47 PM   Result Value Ref Range    POC Glucose 170 (H) 70 - 99 MG/DL   POCT Glucose    Collection Time: 02/19/20  7:35 PM   Result Value Ref Range    POC Glucose 109 (H) 70 - 99 MG/DL   POCT Glucose    Collection Time: 02/20/20 11:50 AM   Result Value Ref Range    POC Glucose 85 70 - 99 MG/DL       PSYCHIATRIC ASSESSMENT / MENTAL STATUS EXAM:   Vitals: Blood pressure 102/60, pulse 74, temperature 97.1 °F (36.2 °C), temperature source Temporal, resp. rate 18, height 6' 2\" (1.88 m), weight 195 lb (88.5 kg), SpO2 98 %. CONSTITUTIONAL:    Appearance: appears stated age. Alert and oriented to person only. No acute distress. Adequate grooming and hygiene. Fair eye contact. No prominent physical abnormalities. Attitude: Manner is cooperative and pleasant  Motor: No psychomotor agitation, retardation or abnormal movements noted  Speech: slurred; normal rate, volume, tone & amount. Language: intact understanding and production  Mood: euthymic  Affect: euthymic, improved range, non-labile, congruent with mood and content of speech  Thought Production: Spontaneous. Thought Form: Noted tangentiality and circumstantiality with flight of ideas and loosening of associations. Thought Content/Perceptions: No WILLIAM, noted AVH, no delusion noted  Insight: questionable  Judgment: questionable  Memory: Immediate, recent, and remote appear intact, though not formally tested. Attention: maintained throughout interview  Fund of knowledge: Average  Gait/Balance: not assessed today    IMPRESSION:   No change in diagnosis        PLAN:  Psychiatric management:medication initiation and titration, group and individual therapy, safe and therapeutic environment.   Status of problem/condition: Improving  Medical co-morbidities: Management per [facility] group, appreciate assistance  Legal Status:

## 2020-02-20 NOTE — PLAN OF CARE
Problem: Falls - Risk of:  Goal: Will remain free from falls  Description  Will remain free from falls  2/20/2020 0945 by Greyson Reed RN  Outcome: Ongoing  2/20/2020 0200 by Flores Rivera LPN  Outcome: Ongoing  Goal: Absence of physical injury  Description  Absence of physical injury  2/20/2020 0945 by Greyson Reed RN  Outcome: Ongoing  2/20/2020 0200 by Flores Rivera LPN  Outcome: Ongoing     Problem: Altered Mood, Psychotic Behavior:  Goal: Able to demonstrate trust by eating, participating in treatment and following staff's direction  Description  Able to demonstrate trust by eating, participating in treatment and following staff's direction  2/20/2020 0945 by Greyson Reed RN  Outcome: Ongoing  2/20/2020 0200 by Flores Rivera LPN  Outcome: Ongoing  Goal: Able to verbalize decrease in frequency and intensity of hallucinations  Description  Able to verbalize decrease in frequency and intensity of hallucinations  2/20/2020 0945 by Greyson Reed RN  Outcome: Ongoing  2/20/2020 0200 by Flores Rivera LPN  Outcome: Ongoing  Goal: Able to verbalize reality based thinking  Description  Able to verbalize reality based thinking  2/20/2020 0945 by Greyson Reed RN  Outcome: Ongoing  2/20/2020 0200 by Flores Rivera LPN  Outcome: Ongoing  Goal: Absence of self-harm  Description  Absence of self-harm  2/20/2020 0945 by Greyson Reed RN  Outcome: Ongoing  2/20/2020 0200 by Flores Rivera LPN  Outcome: Ongoing  Goal: Ability to achieve adequate nutritional intake will improve  Description  Ability to achieve adequate nutritional intake will improve  2/20/2020 0945 by Greyson Reed RN  Outcome: Ongoing  2/20/2020 0200 by Flores Rivera LPN  Outcome: Ongoing  Goal: Ability to interact with others will improve  Description  Ability to interact with others will improve  2/20/2020 0945 by Greyson Reed RN  Outcome: Ongoing  2/20/2020 0200 by Flores Rivera LPN  Outcome: Ongoing  Goal: Compliance with prescribed

## 2020-02-20 NOTE — PROGRESS NOTES
Physical Therapy    Physical Therapy Treatment Note  Name: Julito Bautista MRN: [id number] :   1955   Date:  2020   Admission Date: 2020 Room:  1046/1046-01   Restrictions/Precautions:  Restrictions/Precautions  Restrictions/Precautions: General Precautions, Fall Risk  Required Braces or Orthoses?: No       Communication with other providers:  Partial co-treat with OT, updated progress to staff  Subjective:  Patient states: \"Theres something different with that walker\"  Pain:   Location, Type, Intensity (0/10 to 10/10): Does not report  Objective:    Observation:  P sitting up in wc in main room. Treatment, including education/measures:  Transfers: P performs sit <> stand from wc to RW x 3 consecutive stands, 3 additional stands throughout session. P requires min to mod A for balance 2/2 retropulsion and max cues for hand placement. P demos improved eccentric control to sit with BUE support on wc. Gait: P ambulates with RW with wc follow for safety. P ambulates x 70' then 59' with min A. P with decreased step length and weight shift on L side. P decreased awareness of deficits. Educated on safety with transfers. Educated on safety and addressing gait impairments. P left sitting up in main room with chair alarm on for lunch. Assessment / Impression:    P requires max cues for safety with transfers and is limited by retropulsion. P with significant gait impairments and balance impairments.  Recommend continued skilled PT to address deficits and maximize functional potential.   Patient's tolerance of treatment:  good   Adverse Reaction: none  Significant change in status and impact:  Improved transfers  Barriers to improvement:  Attention, decreased awareness of deficits  Plan for Next Session:    Continue working on gait, balance, transfers  Time in:  1149  Time out:  1213  Timed treatment minutes:  24  Total treatment time:  24    Previously filed items:  Social/Functional History  Lives With:

## 2020-02-20 NOTE — PROGRESS NOTES
Pt very pleasant this shift, speaking clearly. Shower given using shower chair. Pt performed majority of shower on his own, just needed set up assistance and standby assist with transferring back into wheelchair. This RN shaved pt face per request. Sitting in tv room eating snack and watching tv at this time, awaiting visit from friend.

## 2020-02-20 NOTE — PLAN OF CARE
Problem: Falls - Risk of:  Goal: Will remain free from falls  Description  Will remain free from falls  2/20/2020 0200 by [employer], LPN  Outcome: Ongoing     Problem: Falls - Risk of:  Goal: Absence of physical injury  Description  Absence of physical injury  2/20/2020 0200 by [employer] ÁNGELA Smith LPN  Outcome: Ongoing     Problem: Altered Mood, Psychotic Behavior:  Goal: Able to demonstrate trust by eating, participating in treatment and following staff's direction  Description  Able to demonstrate trust by eating, participating in treatment and following staff's direction  2/20/2020 0200 by [employer], LPN  Outcome: Ongoing     Problem: Altered Mood, Psychotic Behavior:  Goal: Able to verbalize decrease in frequency and intensity of hallucinations  Description  Able to verbalize decrease in frequency and intensity of hallucinations  2/20/2020 0200 by [employer], LPN  Outcome: Ongoing     Problem: Altered Mood, Psychotic Behavior:  Goal: Able to verbalize reality based thinking  Description  Able to verbalize reality based thinking  2/20/2020 0200 by [employer], LPN  Outcome: Ongoing     Problem: Altered Mood, Psychotic Behavior:  Goal: Absence of self-harm  Description  Absence of self-harm  2/20/2020 0200 by [email or web address], LPN  Outcome: Ongoing     Problem: Altered Mood, Psychotic Behavior:  Goal: Ability to achieve adequate nutritional intake will improve  Description  Ability to achieve adequate nutritional intake will improve  2/20/2020 0200 by [employer], LPN  Outcome: Ongoing     Problem: Altered Mood, Psychotic Behavior:  Goal: Ability to interact with others will improve  Description  Ability to interact with others will improve  2/20/2020 0200 by [employer], LPN  Outcome: Ongoing     Problem: Altered Mood, Psychotic Behavior:  Goal: Compliance with prescribed medication regimen will improve  Description  Compliance with prescribed medication regimen will improve  2/20/2020 0200 by [employer] ÁNGELA

## 2020-02-20 NOTE — GROUP NOTE
Group Therapy Note    Date: 2/19/2020    Group Start Time: 7155  Group End Time: 0536  Group Topic: Psychoeducation    [address] GARRET Hernandez        Group Therapy Note    Attendees: 4/5      Notes:  Pts participated in recreational therapy group; Relaxation Through Music. Pts were each allowed to pick a song they could listen to that will help them relax. Pts were encouraged to relax and socialize as the music was playing. Pt participated actively in the group. Pt noted to smile and hum along to songs that he picked. Pt expressed enjoyment in the group. Status After Intervention:  Improved    Participation Level:  Active Listener and Interactive    Participation Quality: Appropriate, Attentive and Sharing      Speech:  normal      Thought Process/Content: Logical      Affective Functioning: Congruent      Mood: Bright      Level of consciousness:  Alert and Attentive      Response to Learning: Able to verbalize current knowledge/experience, Able to verbalize/acknowledge new learning and Able to retain information      Endings: None Reported    Modes of Intervention: Education, Socialization and Activity      Discipline Responsible: Certified Therapeutic Recreation Specialist      Signature:  Hernandez Conrad, [address], [address] [facility]

## 2020-02-21 LAB
GLUCOSE BLD-MCNC: 102 MG/DL (ref 70–99)
GLUCOSE BLD-MCNC: 134 MG/DL (ref 70–99)
GLUCOSE BLD-MCNC: 136 MG/DL (ref 70–99)
GLUCOSE BLD-MCNC: 137 MG/DL (ref 70–99)

## 2020-02-21 PROCEDURE — 82962 GLUCOSE BLOOD TEST: CPT

## 2020-02-21 PROCEDURE — 6370000000 HC RX 637 (ALT 250 FOR IP): Performed by: HOSPITALIST

## 2020-02-21 PROCEDURE — 1240000000 HC EMOTIONAL WELLNESS R&B

## 2020-02-21 PROCEDURE — 6370000000 HC RX 637 (ALT 250 FOR IP): Performed by: INTERNAL MEDICINE

## 2020-02-21 PROCEDURE — 6370000000 HC RX 637 (ALT 250 FOR IP): Performed by: PSYCHIATRY & NEUROLOGY

## 2020-02-21 PROCEDURE — 6370000000 HC RX 637 (ALT 250 FOR IP): Performed by: NURSE PRACTITIONER

## 2020-02-21 PROCEDURE — 99233 SBSQ HOSP IP/OBS HIGH 50: CPT | Performed by: NURSE PRACTITIONER

## 2020-02-21 RX ADMIN — BENZTROPINE MESYLATE 1 MG: 1 TABLET ORAL at 08:58

## 2020-02-21 RX ADMIN — ZIPRASIDONE HYDROCHLORIDE 20 MG: 20 CAPSULE ORAL at 08:58

## 2020-02-21 RX ADMIN — LEVOTHYROXINE SODIUM 75 MCG: 0.07 TABLET ORAL at 11:45

## 2020-02-21 RX ADMIN — LORAZEPAM 0.5 MG: 0.5 TABLET ORAL at 21:09

## 2020-02-21 RX ADMIN — DULOXETINE HYDROCHLORIDE 90 MG: 30 CAPSULE, DELAYED RELEASE ORAL at 08:58

## 2020-02-21 RX ADMIN — TAMSULOSIN HYDROCHLORIDE 0.4 MG: 0.4 CAPSULE ORAL at 08:58

## 2020-02-21 RX ADMIN — ZIPRASIDONE HYDROCHLORIDE 60 MG: 20 CAPSULE ORAL at 20:56

## 2020-02-21 RX ADMIN — METOPROLOL SUCCINATE 50 MG: 50 TABLET, EXTENDED RELEASE ORAL at 08:58

## 2020-02-21 RX ADMIN — LACOSAMIDE 200 MG: 100 TABLET, FILM COATED ORAL at 20:57

## 2020-02-21 RX ADMIN — AMLODIPINE BESYLATE 5 MG: 5 TABLET ORAL at 08:58

## 2020-02-21 RX ADMIN — LAMOTRIGINE 125 MG: 100 TABLET ORAL at 21:08

## 2020-02-21 RX ADMIN — LISINOPRIL 10 MG: 10 TABLET ORAL at 08:58

## 2020-02-21 RX ADMIN — LAMOTRIGINE 125 MG: 100 TABLET ORAL at 08:56

## 2020-02-21 RX ADMIN — ATORVASTATIN CALCIUM 80 MG: 40 TABLET, FILM COATED ORAL at 08:57

## 2020-02-21 RX ADMIN — ASPIRIN AND DIPYRIDAMOLE 1 CAPSULE: 25; 200 CAPSULE, EXTENDED RELEASE ORAL at 20:58

## 2020-02-21 RX ADMIN — DIVALPROEX SODIUM 500 MG: 500 TABLET, EXTENDED RELEASE ORAL at 20:57

## 2020-02-21 RX ADMIN — BENZTROPINE MESYLATE 1 MG: 1 TABLET ORAL at 20:56

## 2020-02-21 RX ADMIN — LORAZEPAM 0.5 MG: 0.5 TABLET ORAL at 08:57

## 2020-02-21 RX ADMIN — MELATONIN TAB 3 MG 3 MG: 3 TAB at 20:56

## 2020-02-21 ASSESSMENT — PAIN SCALES - GENERAL: PAINLEVEL_OUTOF10: 0

## 2020-02-21 NOTE — PROGRESS NOTES
Occupational Therapy  Physical Rehabilitation: OCCUPATIONAL THERAPY     [x] daily progress note       [] discharge       Patient Name:  Patricia Gaytan   :  1955 MRN: [id number]  Room:  [address] [place] [address] Date of Admission: 2020  Rehabilitation Diagnosis:   Schizophrenia ([address] [F20.9]       Date 2020       Day of ARU Week:  7   Time IN/OUT 1149/1203   Individual Tx Minutes    Group Tx Minutes    Co-Treat Minutes 14   Concurrent Tx Minutes    TOTAL Tx Time Mins 14   Variance Time    Variance Time []   Refusal due to:     []   Medical hold/reason:    []   Illness   []   Off Unit for test/procedure  []   Extra time needed to complete task  []   Therapeutic need  []   Other (specify):   Restrictions Restrictions/Precautions: General Precautions, Fall Risk      SBU   Communication with other providers: [x]   OK to see per nursing:     []   Spoke with team member regarding:      Subjective observations and cognitive status:      Pain level/location:  0  /10       Location:    Discharge recommendations  Anticipated discharge date: TBD  Destination: []home alone   []home alone w assist prn   [] home w/ family    [] Continuous supervision       []SNF    [] Assisted living     [] Other:   Continued therapy: []HHC OT  []OUTPATIENT  OT   [] No Further OT  Equipment needs: TBD     Toileting:x      Toilet Transfers:  x  Device Used:    []   Standard Toilet         []   Grab Bars           []  Bedside Commode       []   Elevated Toilet          []   Other:        Bed Mobility:           [x]   Pt received out of bed   Rolling R/L:x  Scooting:  x  Supine --> Sit: x  Sit --> Supine: x    Transfers:    Sit--> Stand: Min-Mod A  Stand --> Sit:   Min-Mod A  Stand-Pivot:  Doesn't seem to weight shift on L side  Other:    Assistive device required for transfer:   RW      Functional Mobility:    Assistance:  Min -Mod A 70 ft  Device:   [x]   Rolling Walker     []   Standard Walker []   Wheelchair        []   [employer]       []

## 2020-02-21 NOTE — GROUP NOTE
Group Therapy Note    Date: 2/21/2020    Group Start Time: 6130  Group End Time: 0900    Number of Participants: 4/7    Type: Morning Goals Group/ Community Meeting    Group Topic/Objective: Set Goal For The Day and to review Unit Rules and Regulations. Patient's Goal:  Pt expressed desire to work     Notes:  Pt noted to be lethargic during group. Pt reports this is d/t male peer who kept him up during the night. Pt states he feels \"Sleepy\" and is grateful for \"Coffee. \"    Depression (0-10): Pt states \"a little\"    Anxiety (0-10): 0    Irritability/Aggitation (0-10): 0    Status After Intervention:  Unchanged    Participation Level: Interactive    Participation Quality: Lethargic    Speech:  normal    Thought Process/Content: Logical    Affective Functioning: Blunted    Mood: Blunted    Level of consciousness:  Drowsy    Response to Learning: Able to verbalize current knowledge/experience    Endings: None Reported    Modes of Intervention: Education, Support and Socialization    Discipline Responsible: Certified Therapeutic Recreation Specialist    Electronically signed by LUCY Mtz MA on 2/21/2020 at 9:02 AM

## 2020-02-21 NOTE — GROUP NOTE
Group Therapy Note    Date: 2/21/2020    Group Start Time: 1115  Group End Time: 1200  Group Topic: Psychotherapy    Vanessa Leggett, MSW, YOGESHW        Group Therapy Note    Attendees: 5/7     Patient's Goal:  Topic was finding sources of happiness. Notes:  Patient was active and participated in group. Status After Intervention:  Improved    Participation Level:  Active Listener and Interactive    Participation Quality: Appropriate and Attentive    Speech:  normal    Thought Process/Content: Linear    Affective Functioning: Congruent    Mood: elevated    Level of consciousness:  Alert and Attentive    Response to Learning: Able to verbalize current knowledge/experience and Able to verbalize/acknowledge new learning    Endings: None Reported    Modes of Intervention: Education, Exploration and Problem-solving    Discipline Responsible: /Counselor      Signature:  TOÑITO Franklin, JOSE A

## 2020-02-21 NOTE — CARE COORDINATION
[facility]  Week 3 Interdisciplinary Treatment Plan Note     Review Date & Time: 02/21/20  1045    Admission Type:   Admission Type:  Involuntary    Reason for admission:  Reason for Admission: Recent seizure resulting in decrease in functioning, suspected exacerbation of schizophrenia    Patient Diagnosis: Schizophrenia ([facility]    PATIENT STRENGTHS:  Patient Strengths:Strengths: Medication Compliance  Patient Strengths and Limitations:Limitations: (LIVE)  Addictive Behavior:Addictive Behavior  In the past 3 months, have you felt or has someone told you that you have a problem with:  : Other(Comments)(LIVE)  Do you have a history of Chemical Use?: No  Do you have a history of Alcohol Use?: No  Do you have a history of Street Drug Abuse?: No  Histroy of Prescripton Drug Abuse?: No  Medical Problems:   Past Medical History:   Diagnosis Date    Avascular necrosis of bone ([facility]     right hip    CAD (coronary artery disease)     Coronary atherosclerosis     Dementia     Dementia ([facility]     Dizziness     cerebelar atrophy    Environmental allergies     Essential hypertension     H/O Doppler ultrasound 07/11/2016    carotid - normal    IBS (irritable bowel syndrome)     Mental disability     Recurrent falls     S/P PTCA (percutaneous transluminal coronary angioplasty) 06/16/2016    Cx OM stent    Seizure disorder ([facility]     Urge incontinence of urine     Wrist joint pain     chronic left       Risk:  Fall RiskTotal: 112  Andre Scale Andre Scale Score: 15  BVC Total: 1    Status EXAM:   Status and Exam  Normal: Yes  Facial Expression: Brightened  Affect: Appropriate  Level of Consciousness: Alert  Mood:Normal: Yes  Mood: Labile  Motor Activity:Normal: No  Motor Activity: Unusual Posture/Gait  Interview Behavior: Cooperative  Preception: [place] to Person, [place] to Situation, [place] to Time  Attention:Normal: Yes  Attention: Distractible  Thought Processes: Circumstantial  Thought Content:Normal:

## 2020-02-21 NOTE — PROGRESS NOTES
Psychiatric Progress Note    Kari Silverio  [id number]  02/21/20    CHIEF COMPLAINT: unchanged    HPI: Kari Silverio  is a 61 yo [facility] American male who presents for exacerbation of schizophrenia with suicidal ideation and depression severe. Pt noted recent exacerbation of psychosis and mood. Pt noted he currently feels safe and comfortable on the unit. Pt was in agreement with treatment team.  Pt was polite and cordial during the interview process. Pt agreed to start Depakote requiring Lamictal to be cut in half due to medication titration. Pt was in agreement.     Patient was alert and lucid today during the interview. Speech continues to become clearer. Pt noted he is doing [place] today. \"  Pt noted he is sleeping \"alright. \"  Pt noted his appetite is \"good. \" Pt endorsed anxiety and depression as \"a little\". Pt  Denied any thoughts to harm himself or anyone else. Pt denied both auditory and visual hallucinations. Review of the chart notes patient participating in Psychotherapy groups. Physical and Occupational Therapy continue to work with the patient. SW is working on obtaining placement for the patient once ready for discharge.     AIMS=0  NO TD noted    Hospitalist following - appreciate their assistance    No TD noted, AIMS=0    Allergies   Allergen Reactions    Zyprexa [Olanzapine] Other (See Comments)     Developed severe tardive dyskinesia       Medications Prior to Admission: ziprasidone (GEODON) 60 MG capsule, Take 1 capsule by mouth 2 times daily (with meals)  metoprolol succinate (TOPROL XL) 100 MG extended release tablet, Take 1 tablet by mouth daily  amLODIPine (NORVASC) 10 MG tablet, Take 0.5 tablets by mouth daily  levothyroxine (SYNTHROID) 75 MCG tablet, Take 1 tablet by mouth Daily  melatonin 3 MG TABS tablet, Take 3 mg by mouth daily  VIMPAT 200 MG tablet, TAKE ONE (1) TABLET BY MOUTH TWO TIMES DAILY  DULoxetine (CYMBALTA) 30 MG extended release capsule, Take 1 capsule by mouth daily  lisinopril (PRINIVIL;ZESTRIL) 10 MG tablet, Take 1 tablet by mouth daily  aspirin-dipyridamole (AGGRENOX)  MG per extended release capsule, Take 1 capsule by mouth nightly   lamoTRIgine (LAMICTAL) 100 MG tablet, Take 250 mg by mouth 2 times daily  atorvastatin (LIPITOR) 80 MG tablet, Take 1 tablet by mouth daily  famotidine (PEPCID) 20 MG tablet, Take 20 mg by mouth nightly  acetaminophen (TYLENOL) 500 MG tablet, Take 1,000 mg by mouth every 4 hours as needed for Pain or Fever  metFORMIN (GLUCOPHAGE) 500 MG tablet, Take 2 tablets by mouth 2 times daily (with meals)  loperamide (IMODIUM) 2 MG capsule, Take 2 mg by mouth every 6 hours as needed for Diarrhea     Past Medical History:   Diagnosis Date    Avascular necrosis of bone (HCC)     right hip    CAD (coronary artery disease)     Coronary atherosclerosis     Dementia     Dementia ([address]     Dizziness     cerebelar atrophy    Environmental allergies     Essential hypertension     H/O Doppler ultrasound 07/11/2016    carotid - normal    IBS (irritable bowel syndrome)     Mental disability     Recurrent falls     S/P PTCA (percutaneous transluminal coronary angioplasty) 06/16/2016    Cx OM stent    Seizure disorder ([address]     Urge incontinence of urine     Wrist joint pain     chronic left        Patient Active Problem List   Diagnosis    Obsessive-compulsive disorder    IBS (irritable bowel syndrome)    Mental disability    Uncontrolled type 2 diabetes mellitus with diabetic polyneuropathy, without long-term current use of insulin ([address]    Coronary artery disease involving native coronary artery of native heart with unstable angina pectoris ([address]    Essential hypertension    Dyslipidemia    Ataxia    Late effects of CVA (cerebrovascular accident)    Chronic idiopathic constipation    Seizure disorder ([address]    Peripheral polyneuropathy    Hypoglycemia associated with diabetes ([address]    Dysmetria    Coronary atherosclerosis  Urge incontinence of urine    Dementia (HCC)    Recurrent falls    Wrist joint pain    Avascular necrosis of bone (HCC)    AMS (altered mental status)    Acute exacerbation of chronic schizophrenia ([facility]    Schizophrenia ([facility]       Review of Systems    OBJECTIVE  Vital Signs:  Vitals:    02/21/20 0925   BP: (!) 108/55   Pulse: 70   Resp: 16   Temp: 97.4 °F (36.3 °C)   SpO2: 98%       Labs:  Recent Results (from the past 48 hour(s))   POCT Glucose    Collection Time: 02/19/20  4:47 PM   Result Value Ref Range    POC Glucose 170 (H) 70 - 99 MG/DL   POCT Glucose    Collection Time: 02/19/20  7:35 PM   Result Value Ref Range    POC Glucose 109 (H) 70 - 99 MG/DL   POCT Glucose    Collection Time: 02/20/20 11:50 AM   Result Value Ref Range    POC Glucose 85 70 - 99 MG/DL   POCT Glucose    Collection Time: 02/20/20  4:27 PM   Result Value Ref Range    POC Glucose 153 (H) 70 - 99 MG/DL   POCT Glucose    Collection Time: 02/20/20  7:42 PM   Result Value Ref Range    POC Glucose 106 (H) 70 - 99 MG/DL   POCT Glucose    Collection Time: 02/21/20  7:35 AM   Result Value Ref Range    POC Glucose 102 (H) 70 - 99 MG/DL   POCT Glucose    Collection Time: 02/21/20 11:50 AM   Result Value Ref Range    POC Glucose 136 (H) 70 - 99 MG/DL   POCT Glucose    Collection Time: 02/21/20  4:32 PM   Result Value Ref Range    POC Glucose 137 (H) 70 - 99 MG/DL       PSYCHIATRIC ASSESSMENT / MENTAL STATUS EXAM:   Vitals: Blood pressure (!) 108/55, pulse 70, temperature 97.4 °F (36.3 °C), temperature source Temporal, resp. rate 16, height 6' 2\" (1.88 m), weight 195 lb (88.5 kg), SpO2 98 %. CONSTITUTIONAL:    Appearance: appears stated age. Alert and oriented to person only. No acute distress. Adequate grooming and hygiene. Good eye contact. No prominent physical abnormalities. Attitude:  Manner is cooperative and pleasant  Motor: No psychomotor agitation, retardation or abnormal movements noted  Speech: less slurred; normal rate,

## 2020-02-22 ENCOUNTER — APPOINTMENT (OUTPATIENT)
Dept: GENERAL RADIOLOGY | Age: 65
DRG: 885 | End: 2020-02-22
Attending: PSYCHIATRY & NEUROLOGY
Payer: MEDICARE

## 2020-02-22 LAB
GLUCOSE BLD-MCNC: 109 MG/DL (ref 70–99)
GLUCOSE BLD-MCNC: 117 MG/DL (ref 70–99)
GLUCOSE BLD-MCNC: 132 MG/DL (ref 70–99)
GLUCOSE BLD-MCNC: 156 MG/DL (ref 70–99)

## 2020-02-22 PROCEDURE — 6370000000 HC RX 637 (ALT 250 FOR IP): Performed by: PSYCHIATRY & NEUROLOGY

## 2020-02-22 PROCEDURE — 99233 SBSQ HOSP IP/OBS HIGH 50: CPT | Performed by: NURSE PRACTITIONER

## 2020-02-22 PROCEDURE — 6370000000 HC RX 637 (ALT 250 FOR IP): Performed by: HOSPITALIST

## 2020-02-22 PROCEDURE — 1240000000 HC EMOTIONAL WELLNESS R&B

## 2020-02-22 PROCEDURE — 6370000000 HC RX 637 (ALT 250 FOR IP): Performed by: NURSE PRACTITIONER

## 2020-02-22 PROCEDURE — 82962 GLUCOSE BLOOD TEST: CPT

## 2020-02-22 RX ADMIN — LORAZEPAM 0.5 MG: 0.5 TABLET ORAL at 08:39

## 2020-02-22 RX ADMIN — LAMOTRIGINE 125 MG: 100 TABLET ORAL at 08:38

## 2020-02-22 RX ADMIN — DIVALPROEX SODIUM 500 MG: 500 TABLET, EXTENDED RELEASE ORAL at 21:01

## 2020-02-22 RX ADMIN — DULOXETINE HYDROCHLORIDE 90 MG: 30 CAPSULE, DELAYED RELEASE ORAL at 08:38

## 2020-02-22 RX ADMIN — LAMOTRIGINE 125 MG: 100 TABLET ORAL at 21:01

## 2020-02-22 RX ADMIN — LACOSAMIDE 200 MG: 100 TABLET, FILM COATED ORAL at 08:39

## 2020-02-22 RX ADMIN — BENZTROPINE MESYLATE 1 MG: 1 TABLET ORAL at 21:02

## 2020-02-22 RX ADMIN — ASPIRIN AND DIPYRIDAMOLE 1 CAPSULE: 25; 200 CAPSULE, EXTENDED RELEASE ORAL at 21:02

## 2020-02-22 RX ADMIN — LACOSAMIDE 200 MG: 100 TABLET, FILM COATED ORAL at 21:01

## 2020-02-22 RX ADMIN — LEVOTHYROXINE SODIUM 75 MCG: 0.07 TABLET ORAL at 06:26

## 2020-02-22 RX ADMIN — BENZTROPINE MESYLATE 1 MG: 1 TABLET ORAL at 08:39

## 2020-02-22 RX ADMIN — ZIPRASIDONE HYDROCHLORIDE 60 MG: 20 CAPSULE ORAL at 21:01

## 2020-02-22 RX ADMIN — ZIPRASIDONE HYDROCHLORIDE 20 MG: 20 CAPSULE ORAL at 08:39

## 2020-02-22 RX ADMIN — ATORVASTATIN CALCIUM 80 MG: 40 TABLET, FILM COATED ORAL at 08:39

## 2020-02-22 RX ADMIN — TAMSULOSIN HYDROCHLORIDE 0.4 MG: 0.4 CAPSULE ORAL at 08:39

## 2020-02-22 RX ADMIN — LORAZEPAM 0.5 MG: 0.5 TABLET ORAL at 21:02

## 2020-02-22 RX ADMIN — MELATONIN TAB 3 MG 3 MG: 3 TAB at 21:01

## 2020-02-22 ASSESSMENT — PAIN SCALES - GENERAL
PAINLEVEL_OUTOF10: 0
PAINLEVEL_OUTOF10: 0

## 2020-02-22 NOTE — BH NOTE
Pt stated he slid out of his wheelchair. He was attempting to go to the bathroom. ROM WNL. Alert. Stated no pain. When patient was last seen alarm was on and was functioning. Vital signs obtained. Able to stand and bear weight. Notified Dr. Gurpreet Salcedo. Left message with caregiver Miguel Hahn for return phone call. This nurse applying dycem between patient and cushion to reduce sliding.

## 2020-02-22 NOTE — PROGRESS NOTES
Psychiatric Progress Note    Zenon Lacy  [id number]  02/22/20    CHIEF COMPLAINT: unchanged    HPI: Zenon Lacy  is a 61 [facility] [place] [facility] who presents for exacerbation of schizophrenia with suicidal ideation and depression severe. Pt noted recent exacerbation of psychosis and mood. Pt noted he currently feels safe and comfortable on the unit. Pt was in agreement with treatment team.  Pt was polite and cordial during the interview process. Pt agreed to start Depakote requiring Lamictal to be cut in half due to medication titration. Pt was in agreement.     Patient was alert and lucid today during the interview. Speech continues to become clearer. Pt noted he is doing [place] today. \"  Pt noted he is sleeping \"alright. \"  Pt noted his appetite is \"good. \" Pt denied anxiety and depression. Pt  Denied any thoughts to harm himself or anyone else. Pt denied both auditory and visual hallucinations. Bedside RN notes that patient was trying to exit bed last night from head of bed and complained of feeling \"off. \"    Hospitalist following - appreciate their assistance. Parameters added to BP meds. Review of the chart notes patient participating in Psychotherapy groups. Physical and Occupational Therapy continue to work with the patient. SW is working on obtaining placement for the patient once ready for discharge. Plan is to discharge to [facility] once they are no longer quarantined - possibly quarantine will be lifted Monday.     AIMS=0  NO TD noted    Allergies   Allergen Reactions    Zyprexa [Olanzapine] Other (See Comments)     Developed severe tardive dyskinesia       Medications Prior to Admission: ziprasidone (GEODON) 60 MG capsule, Take 1 capsule by mouth 2 times daily (with meals)  metoprolol succinate (TOPROL XL) 100 MG extended release tablet, Take 1 tablet by mouth daily  amLODIPine (NORVASC) 10 MG tablet, Take 0.5 tablets by mouth daily  levothyroxine (SYNTHROID) 75 MCG tablet, Take 1 tablet by mouth Daily  melatonin 3 MG TABS tablet, Take 3 mg by mouth daily  VIMPAT 200 MG tablet, TAKE ONE (1) TABLET BY MOUTH TWO TIMES DAILY  DULoxetine (CYMBALTA) 30 MG extended release capsule, Take 1 capsule by mouth daily  lisinopril (PRINIVIL;ZESTRIL) 10 MG tablet, Take 1 tablet by mouth daily  aspirin-dipyridamole (AGGRENOX)  MG per extended release capsule, Take 1 capsule by mouth nightly   lamoTRIgine (LAMICTAL) 100 MG tablet, Take 250 mg by mouth 2 times daily  atorvastatin (LIPITOR) 80 MG tablet, Take 1 tablet by mouth daily  famotidine (PEPCID) 20 MG tablet, Take 20 mg by mouth nightly  acetaminophen (TYLENOL) 500 MG tablet, Take 1,000 mg by mouth every 4 hours as needed for Pain or Fever  metFORMIN (GLUCOPHAGE) 500 MG tablet, Take 2 tablets by mouth 2 times daily (with meals)  loperamide (IMODIUM) 2 MG capsule, Take 2 mg by mouth every 6 hours as needed for Diarrhea     Past Medical History:   Diagnosis Date    Avascular necrosis of bone (HCC)     right hip    CAD (coronary artery disease)     Coronary atherosclerosis     Dementia     Dementia (HCC)     Dizziness     cerebelar atrophy    Environmental allergies     Essential hypertension     H/O Doppler ultrasound 07/11/2016    carotid - normal    IBS (irritable bowel syndrome)     Mental disability     Recurrent falls     S/P PTCA (percutaneous transluminal coronary angioplasty) 06/16/2016    Cx OM stent    Seizure disorder (HCC)     Urge incontinence of urine     Wrist joint pain     chronic left        Patient Active Problem List   Diagnosis    Obsessive-compulsive disorder    IBS (irritable bowel syndrome)    Mental disability    Uncontrolled type 2 diabetes mellitus with diabetic polyneuropathy, without long-term current use of insulin (HCC)    Coronary artery disease involving native coronary artery of native heart with unstable angina pectoris (HCC)    Essential hypertension    Dyslipidemia    Ataxia    Late effects of CVA (cerebrovascular accident)    Chronic idiopathic constipation    Seizure disorder (HCC)    Peripheral polyneuropathy    Hypoglycemia associated with diabetes ([facility]    Dysmetria    Coronary atherosclerosis    Urge incontinence of urine    Dementia (HCC)    Recurrent falls    Wrist joint pain    Avascular necrosis of bone (HCC)    AMS (altered mental status)    Acute exacerbation of chronic schizophrenia (HCC)    Schizophrenia ([facility]       Review of Systems    OBJECTIVE  Vital Signs:  Vitals:    02/22/20 0915   BP: (!) 114/56   Pulse: 87   Resp: 17   Temp: 97.1 °F (36.2 °C)   SpO2: 95%       Labs:  Recent Results (from the past 48 hour(s))   POCT Glucose    Collection Time: 02/20/20  4:27 PM   Result Value Ref Range    POC Glucose 153 (H) 70 - 99 MG/DL   POCT Glucose    Collection Time: 02/20/20  7:42 PM   Result Value Ref Range    POC Glucose 106 (H) 70 - 99 MG/DL   POCT Glucose    Collection Time: 02/21/20  7:35 AM   Result Value Ref Range    POC Glucose 102 (H) 70 - 99 MG/DL   POCT Glucose    Collection Time: 02/21/20 11:50 AM   Result Value Ref Range    POC Glucose 136 (H) 70 - 99 MG/DL   POCT Glucose    Collection Time: 02/21/20  4:32 PM   Result Value Ref Range    POC Glucose 137 (H) 70 - 99 MG/DL   POCT Glucose    Collection Time: 02/21/20  8:31 PM   Result Value Ref Range    POC Glucose 134 (H) 70 - 99 MG/DL   POCT Glucose    Collection Time: 02/22/20  7:45 AM   Result Value Ref Range    POC Glucose 117 (H) 70 - 99 MG/DL   POCT Glucose    Collection Time: 02/22/20 11:53 AM   Result Value Ref Range    POC Glucose 109 (H) 70 - 99 MG/DL       PSYCHIATRIC ASSESSMENT / MENTAL STATUS EXAM:   Vitals: Blood pressure (!) 114/56, pulse 87, temperature 97.1 °F (36.2 °C), temperature source Temporal, resp. rate 17, height 6' 2\" (1.88 m), weight 195 lb (88.5 kg), SpO2 95 %. CONSTITUTIONAL:    Appearance: appears stated age. Alert and oriented to person only. No acute distress.  Adequate

## 2020-02-22 NOTE — BH NOTE
Group Therapy Note    Date: 2/21/20  Start Time:1930  End Time: 2000  Number of Participants:1    Type of Group: Nursing Education    Educated patient on call light usage      Status After Intervention: verbalized understanding and demonstration of call light    Participation Level: active listener    Participation Quality: attentive      Speech: normal    Response to Learning: Verbalized understanding      Signature:  Shahid Henson RN

## 2020-02-23 LAB
GLUCOSE BLD-MCNC: 104 MG/DL (ref 70–99)
GLUCOSE BLD-MCNC: 111 MG/DL (ref 70–99)
GLUCOSE BLD-MCNC: 114 MG/DL (ref 70–99)
GLUCOSE BLD-MCNC: 132 MG/DL (ref 70–99)

## 2020-02-23 PROCEDURE — 6370000000 HC RX 637 (ALT 250 FOR IP): Performed by: NURSE PRACTITIONER

## 2020-02-23 PROCEDURE — 82962 GLUCOSE BLOOD TEST: CPT

## 2020-02-23 PROCEDURE — 1240000000 HC EMOTIONAL WELLNESS R&B

## 2020-02-23 PROCEDURE — 99233 SBSQ HOSP IP/OBS HIGH 50: CPT | Performed by: NURSE PRACTITIONER

## 2020-02-23 PROCEDURE — 6370000000 HC RX 637 (ALT 250 FOR IP): Performed by: INTERNAL MEDICINE

## 2020-02-23 PROCEDURE — 6370000000 HC RX 637 (ALT 250 FOR IP): Performed by: PSYCHIATRY & NEUROLOGY

## 2020-02-23 PROCEDURE — 6370000000 HC RX 637 (ALT 250 FOR IP): Performed by: HOSPITALIST

## 2020-02-23 RX ADMIN — MELATONIN TAB 3 MG 3 MG: 3 TAB at 20:40

## 2020-02-23 RX ADMIN — ASPIRIN AND DIPYRIDAMOLE 1 CAPSULE: 25; 200 CAPSULE, EXTENDED RELEASE ORAL at 20:40

## 2020-02-23 RX ADMIN — BENZTROPINE MESYLATE 1 MG: 1 TABLET ORAL at 20:40

## 2020-02-23 RX ADMIN — ATORVASTATIN CALCIUM 80 MG: 40 TABLET, FILM COATED ORAL at 08:23

## 2020-02-23 RX ADMIN — AMLODIPINE BESYLATE 5 MG: 5 TABLET ORAL at 08:23

## 2020-02-23 RX ADMIN — DIVALPROEX SODIUM 500 MG: 500 TABLET, EXTENDED RELEASE ORAL at 20:41

## 2020-02-23 RX ADMIN — DULOXETINE HYDROCHLORIDE 90 MG: 30 CAPSULE, DELAYED RELEASE ORAL at 08:22

## 2020-02-23 RX ADMIN — ZIPRASIDONE HYDROCHLORIDE 60 MG: 20 CAPSULE ORAL at 20:40

## 2020-02-23 RX ADMIN — LACOSAMIDE 200 MG: 100 TABLET, FILM COATED ORAL at 08:23

## 2020-02-23 RX ADMIN — METOPROLOL SUCCINATE 50 MG: 50 TABLET, EXTENDED RELEASE ORAL at 08:23

## 2020-02-23 RX ADMIN — ZIPRASIDONE HYDROCHLORIDE 20 MG: 20 CAPSULE ORAL at 08:24

## 2020-02-23 RX ADMIN — LISINOPRIL 10 MG: 10 TABLET ORAL at 08:22

## 2020-02-23 RX ADMIN — ACETAMINOPHEN 325 MG: 325 TABLET ORAL at 20:42

## 2020-02-23 RX ADMIN — LAMOTRIGINE 125 MG: 100 TABLET ORAL at 08:21

## 2020-02-23 RX ADMIN — LEVOTHYROXINE SODIUM 75 MCG: 0.07 TABLET ORAL at 06:02

## 2020-02-23 RX ADMIN — LACOSAMIDE 200 MG: 100 TABLET, FILM COATED ORAL at 20:40

## 2020-02-23 RX ADMIN — LORAZEPAM 0.5 MG: 0.5 TABLET ORAL at 08:22

## 2020-02-23 RX ADMIN — LORAZEPAM 0.5 MG: 0.5 TABLET ORAL at 20:40

## 2020-02-23 RX ADMIN — TAMSULOSIN HYDROCHLORIDE 0.4 MG: 0.4 CAPSULE ORAL at 08:22

## 2020-02-23 RX ADMIN — BENZTROPINE MESYLATE 1 MG: 1 TABLET ORAL at 08:23

## 2020-02-23 RX ADMIN — LAMOTRIGINE 125 MG: 100 TABLET ORAL at 20:40

## 2020-02-23 ASSESSMENT — PAIN DESCRIPTION - FREQUENCY: FREQUENCY: INTERMITTENT

## 2020-02-23 ASSESSMENT — PAIN - FUNCTIONAL ASSESSMENT: PAIN_FUNCTIONAL_ASSESSMENT: ACTIVITIES ARE NOT PREVENTED

## 2020-02-23 ASSESSMENT — PAIN SCALES - GENERAL
PAINLEVEL_OUTOF10: 2
PAINLEVEL_OUTOF10: 2

## 2020-02-23 ASSESSMENT — PAIN DESCRIPTION - ORIENTATION: ORIENTATION: LOWER

## 2020-02-23 ASSESSMENT — PAIN DESCRIPTION - LOCATION: LOCATION: BACK

## 2020-02-23 ASSESSMENT — PAIN DESCRIPTION - PAIN TYPE: TYPE: ACUTE PAIN

## 2020-02-23 ASSESSMENT — PAIN DESCRIPTION - DESCRIPTORS: DESCRIPTORS: DISCOMFORT;ACHING

## 2020-02-23 ASSESSMENT — PAIN DESCRIPTION - ONSET: ONSET: GRADUAL

## 2020-02-23 ASSESSMENT — PAIN DESCRIPTION - PROGRESSION: CLINICAL_PROGRESSION: GRADUALLY IMPROVING

## 2020-02-23 NOTE — BH NOTE
Pt compliant with medications, used urinal several times through the night. Denies SI, anxiety or AVH. Denies pain or discomfort. Assisted to bed at 2045, bed alarm on, educated on call light.

## 2020-02-23 NOTE — PROGRESS NOTES
General Observations: Pt appears to be Cooperative and Pleasant during the shift today Pt has been compliant with medication and denies side effects     Amount of sleep: 8 hours    Appetite: within normal limits    Suicidal Ideations: No    Homicidal Ideations: No    Hallucinations:  absent -     Delusions:  absent -          Rosemarie Claros RN

## 2020-02-23 NOTE — GROUP NOTE
Group Therapy Note    Date: 2/23/2020    Group Start Time: 6061  Group End Time: 1100  Group Topic: Psychoeducation    Vanessa Vogel 79., MSW, YOGESHW        Group Therapy Note    Attendees: 5/10     Patient's Goal:  Toss and Talk Ball    Notes:  Patient was active and participated in group.     Status After Intervention:  Improved    Participation Level: Interactive    Participation Quality: Appropriate    Speech:  normal    Thought Process/Content: Linear    Affective Functioning: Congruent    Mood: elevated    Level of consciousness:  Alert and Attentive    Response to Learning: Able to verbalize current knowledge/experience and Able to verbalize/acknowledge new learning    Endings: None Reported    Modes of Intervention: Exploration, Activity and Movement    Discipline Responsible: /Counselor      Signature:  TOÑITO Burgess, JOSE A

## 2020-02-23 NOTE — GROUP NOTE
Group Therapy Note    Date: 2/23/2020    Group Start Time: 0830  Group End Time: 0930  Group Topic: [address] [facility]    TOÑITO Bryan LSW        Group Therapy Note    Attendees: 9/10     Patient's Goal:  \"Shower\"    Notes:  Patient was active and participated in group. Patient stated he felt \"alright\", was grateful for \"sleeping really well\", and reported \"don't know\" for number of hours slept. Patient rated his depression as a 0, anxiety as a 0, and irritability as a 0. Status After Intervention:  Improved    Participation Level:  Active Listener and Interactive    Participation Quality: Appropriate, Attentive and Sharing    Speech:  normal    Thought Process/Content: Linear    Affective Functioning: Congruent    Mood: elevated    Level of consciousness:  Alert and Attentive    Response to Learning: Capable of insight    Endings: None Reported    Modes of Intervention: Socialization and Clarifying    Discipline Responsible: /Counselor      Signature:  TOÑITO Bryan LSW

## 2020-02-23 NOTE — PLAN OF CARE
Problem: Falls - Risk of:  Goal: Will remain free from falls  Description  Will remain free from falls  2/23/2020 0047 by Barrington Mesa RN  Outcome: Ongoing  2/23/2020 0033 by Barrington Mesa RN  Outcome: Ongoing  Goal: Absence of physical injury  Description  Absence of physical injury  2/23/2020 0047 by Barrington Mesa RN  Outcome: Ongoing  2/23/2020 0033 by Barrington Mesa RN  Outcome: Ongoing     Problem: Altered Mood, Psychotic Behavior:  Goal: Able to demonstrate trust by eating, participating in treatment and following staff's direction  Description  Able to demonstrate trust by eating, participating in treatment and following staff's direction  2/23/2020 0047 by Barrington Mesa RN  Outcome: Ongoing  2/23/2020 0033 by Barrington Mesa RN  Outcome: Ongoing  Goal: Able to verbalize decrease in frequency and intensity of hallucinations  Description  Able to verbalize decrease in frequency and intensity of hallucinations  2/23/2020 0047 by Barrington Mesa RN  Outcome: Ongoing  2/23/2020 0033 by Barrington Mesa RN  Outcome: Ongoing  Goal: Able to verbalize reality based thinking  Description  Able to verbalize reality based thinking  2/23/2020 0047 by Barrington Mesa RN  Outcome: Ongoing  2/23/2020 0033 by Barrington Mesa RN  Outcome: Ongoing  Goal: Absence of self-harm  Description  Absence of self-harm  2/23/2020 0047 by Barrington Mesa RN  Outcome: Ongoing  2/23/2020 0033 by Barrington Mesa RN  Outcome: Ongoing  Goal: Ability to achieve adequate nutritional intake will improve  Description  Ability to achieve adequate nutritional intake will improve  2/23/2020 0047 by Barrington Mesa RN  Outcome: Ongoing  2/23/2020 0033 by Barrington Mesa RN  Outcome: Ongoing  Goal: Ability to interact with others will improve  Description  Ability to interact with others will improve  2/23/2020 0047 by Barrington Mesa RN  Outcome: Ongoing  2/23/2020 0033 by Barrington Mesa RN  Outcome: Ongoing  Goal: Compliance with prescribed medication regimen will improve  Description  Compliance with prescribed medication regimen will improve  2/23/2020 0047 by Royal Adam RN  Outcome: Ongoing  2/23/2020 0033 by Royal Adam RN  Outcome: Ongoing  Goal: Patient specific goal  Description  Patient specific goal  2/23/2020 0047 by Royal Adam RN  Outcome: Ongoing  2/23/2020 0033 by Royal Adam RN  Outcome: Ongoing     Problem: Risk for Impaired Skin Integrity  Goal: Tissue integrity - skin and mucous membranes  Description  Structural intactness and normal physiological function of skin and  mucous membranes.   2/23/2020 0047 by Royal Adam RN  Outcome: Ongoing  2/23/2020 0033 by Royal Adam RN  Outcome: Ongoing     Problem: Pain:  Goal: Pain level will decrease  Description  Pain level will decrease  2/23/2020 0047 by Royal Adam RN  Outcome: Ongoing  2/23/2020 0033 by Royal Adam RN  Outcome: Ongoing  Goal: Control of acute pain  Description  Control of acute pain  2/23/2020 0047 by Royal Adam RN  Outcome: Ongoing  2/23/2020 0033 by Royal Adam RN  Outcome: Ongoing  Goal: Control of chronic pain  Description  Control of chronic pain  2/23/2020 0047 by Royal Adam RN  Outcome: Ongoing  2/23/2020 0033 by Royal Adam RN  Outcome: Ongoing

## 2020-02-23 NOTE — PROGRESS NOTES
asking for breakfast, feels hungry. Objective: Intake/Output Summary (Last 24 hours) at 2/23/2020 1013  Last data filed at 2/22/2020 1800  Gross per 24 hour   Intake 240 ml   Output --   Net 240 ml      Vitals:   Vitals:    02/23/20 0745   BP: 113/74   Pulse: 76   Resp: 16   Temp: 97.4 °F (36.3 °C)   SpO2: 98%     Physical Exam:  Gen:  awake, alert, cooperative, no apparent distress   EYES:  Lids and lashes normal, pupils equal, round and reactive to light, extra ocular muscles intact, sclera clear, conjunctiva normal  ENT:  Normocephalic, oral pharynx with moist mucus membranes, tonsils without erythema or exudates,  NECK:  Supple, symmetrical, trachea midline, no adenopathy,  LUNGS:  Clear to auscultate bilaterally, no rales ronchi or wheezing noted. CARDIOVASCULAR:  regular rate and rhythm, normal S1 and S2, no S3 or S4, and no murmur noted  ABDOMEN: Normal BS, Non tender, non distended, no HSM noted. MUSCULOSKELETAL:  ROM of all extremities grossly wnl, ambulating by wheelchair  NEUROLOGIC: Cranial nerves II-XII are grossly intact. Motor is 5 out of 5 bilaterally. Sensory is intact  SKIN:  no bruising or bleeding, normal skin color, texture, turgor and no redness, warmth, or swelling    Ct Abdomen Pelvis Wo Contrast Additional Contrast? None    Result Date: 1/31/2020  EXAMINATION: CT OF THE ABDOMEN AND PELVIS WITHOUT CONTRAST 1/28/2020 9:04 am TECHNIQUE: CT of the abdomen and pelvis was performed without the administration of intravenous contrast. Multiplanar reformatted images are provided for review. Dose modulation, iterative reconstruction, and/or weight based adjustment of the mA/kV was utilized to reduce the radiation dose to as low as reasonably achievable. COMPARISON: None.  HISTORY: ORDERING SYSTEM PROVIDED HISTORY: AMS TECHNOLOGIST PROVIDED HISTORY: Reason for exam:->AMS Additional Contrast?->None Reason for Exam: ams  MRDD  /// ABD PAIN Acuity: Acute Type of Exam: Initial FINDINGS: Lower collection. The gray-white differentiation is maintained without evidence of an acute infarct. There is no evidence of hydrocephalus. Cerebellar atrophy noted, stable from previous examination, which can be seen in the setting of chronic alcoholism or seizure medication use. ORBITS: The visualized portion of the orbits demonstrate no acute abnormality. SINUSES: The visualized paranasal sinuses and mastoid air cells demonstrate no acute abnormality. SOFT TISSUES/SKULL:  No acute abnormality of the visualized skull or soft tissues. No acute intracranial abnormality. Persistent cerebellar atrophy. Xr Chest Portable    Result Date: 1/28/2020  EXAMINATION: ONE XRAY VIEW OF THE CHEST 1/28/2020 8:29 am COMPARISON: 01/07/2020 HISTORY: ORDERING SYSTEM PROVIDED HISTORY: ams TECHNOLOGIST PROVIDED HISTORY: Reason for exam:->ams Reason for Exam: ams Acuity: Unknown Type of Exam: Unknown Relevant Medical/Surgical History: cad    dementia FINDINGS: Hypoinflated lungs. Coronary artery stent. Heart size and mediastinal contours are within normal limits. The pulmonary vascularity is normal.  No focal airspace consolidation. No significant pleural effusion or pneumothorax. Hypoinflated lungs. Otherwise, no acute cardiopulmonary abnormality. Mri Brain Wo Contrast    Result Date: 1/28/2020  EXAMINATION: MRI OF THE BRAIN WITHOUT CONTRAST  1/28/2020 4:08 pm TECHNIQUE: Multiplanar multisequence MRI of the brain was performed without the administration of intravenous contrast. COMPARISON: 12/22/2019 HISTORY: ORDERING SYSTEM PROVIDED HISTORY: AMS TECHNOLOGIST PROVIDED HISTORY: Reason for exam:->AMS Reason for Exam: AMS, possible fall FINDINGS: INTRACRANIAL STRUCTURES/VENTRICLES: There is no acute infarct or mass. There is stable cerebellar predominant parenchymal volume loss. No focal parenchymal signal abnormality is seen. No mass effect or midline shift. No evidence of an acute intracranial hemorrhage.   The

## 2020-02-24 ENCOUNTER — APPOINTMENT (OUTPATIENT)
Dept: GENERAL RADIOLOGY | Age: 65
DRG: 885 | End: 2020-02-24
Attending: PSYCHIATRY & NEUROLOGY
Payer: MEDICARE

## 2020-02-24 LAB
GLUCOSE BLD-MCNC: 104 MG/DL (ref 70–99)
GLUCOSE BLD-MCNC: 105 MG/DL (ref 70–99)
GLUCOSE BLD-MCNC: 152 MG/DL (ref 70–99)
GLUCOSE BLD-MCNC: 96 MG/DL (ref 70–99)

## 2020-02-24 PROCEDURE — 6370000000 HC RX 637 (ALT 250 FOR IP): Performed by: NURSE PRACTITIONER

## 2020-02-24 PROCEDURE — 99233 SBSQ HOSP IP/OBS HIGH 50: CPT | Performed by: NURSE PRACTITIONER

## 2020-02-24 PROCEDURE — 6370000000 HC RX 637 (ALT 250 FOR IP): Performed by: INTERNAL MEDICINE

## 2020-02-24 PROCEDURE — 6370000000 HC RX 637 (ALT 250 FOR IP): Performed by: PSYCHIATRY & NEUROLOGY

## 2020-02-24 PROCEDURE — 6370000000 HC RX 637 (ALT 250 FOR IP): Performed by: HOSPITALIST

## 2020-02-24 PROCEDURE — 1240000000 HC EMOTIONAL WELLNESS R&B

## 2020-02-24 PROCEDURE — 82962 GLUCOSE BLOOD TEST: CPT

## 2020-02-24 RX ADMIN — LEVOTHYROXINE SODIUM 75 MCG: 0.07 TABLET ORAL at 06:07

## 2020-02-24 RX ADMIN — DIVALPROEX SODIUM 500 MG: 500 TABLET, EXTENDED RELEASE ORAL at 20:30

## 2020-02-24 RX ADMIN — ZIPRASIDONE HYDROCHLORIDE 60 MG: 20 CAPSULE ORAL at 20:27

## 2020-02-24 RX ADMIN — LAMOTRIGINE 125 MG: 100 TABLET ORAL at 08:05

## 2020-02-24 RX ADMIN — METOPROLOL SUCCINATE 50 MG: 50 TABLET, EXTENDED RELEASE ORAL at 08:05

## 2020-02-24 RX ADMIN — LACOSAMIDE 200 MG: 100 TABLET, FILM COATED ORAL at 20:28

## 2020-02-24 RX ADMIN — AMLODIPINE BESYLATE 5 MG: 5 TABLET ORAL at 08:06

## 2020-02-24 RX ADMIN — TAMSULOSIN HYDROCHLORIDE 0.4 MG: 0.4 CAPSULE ORAL at 08:06

## 2020-02-24 RX ADMIN — BENZTROPINE MESYLATE 1 MG: 1 TABLET ORAL at 08:06

## 2020-02-24 RX ADMIN — DULOXETINE HYDROCHLORIDE 90 MG: 30 CAPSULE, DELAYED RELEASE ORAL at 08:06

## 2020-02-24 RX ADMIN — LACOSAMIDE 200 MG: 100 TABLET, FILM COATED ORAL at 08:12

## 2020-02-24 RX ADMIN — ASPIRIN AND DIPYRIDAMOLE 1 CAPSULE: 25; 200 CAPSULE, EXTENDED RELEASE ORAL at 20:27

## 2020-02-24 RX ADMIN — ATORVASTATIN CALCIUM 80 MG: 40 TABLET, FILM COATED ORAL at 08:06

## 2020-02-24 RX ADMIN — BENZTROPINE MESYLATE 1 MG: 1 TABLET ORAL at 20:27

## 2020-02-24 RX ADMIN — LISINOPRIL 10 MG: 10 TABLET ORAL at 08:05

## 2020-02-24 RX ADMIN — LORAZEPAM 0.5 MG: 0.5 TABLET ORAL at 20:28

## 2020-02-24 RX ADMIN — LORAZEPAM 0.5 MG: 0.5 TABLET ORAL at 08:06

## 2020-02-24 RX ADMIN — MELATONIN TAB 3 MG 3 MG: 3 TAB at 20:28

## 2020-02-24 RX ADMIN — ZIPRASIDONE HYDROCHLORIDE 20 MG: 20 CAPSULE ORAL at 08:06

## 2020-02-24 RX ADMIN — LAMOTRIGINE 125 MG: 100 TABLET ORAL at 20:27

## 2020-02-24 ASSESSMENT — PAIN SCALES - PAIN ASSESSMENT IN ADVANCED DEMENTIA (PAINAD)
TOTALSCORE: 0
FACIALEXPRESSION: 0
NEGVOCALIZATION: 0
BODYLANGUAGE: 0
CONSOLABILITY: 0
BREATHING: 0

## 2020-02-24 ASSESSMENT — PAIN SCALES - GENERAL: PAINLEVEL_OUTOF10: 0

## 2020-02-24 ASSESSMENT — PAIN SCALES - WONG BAKER: WONGBAKER_NUMERICALRESPONSE: 0

## 2020-02-24 NOTE — PROGRESS NOTES
Psychiatric Progress Note    Maryse Stallworth  [id number]  02/23/20    CHIEF COMPLAINT: unchanged    HPI: Maryse Stallworth  is a 61 yo [facility] American male who presents for exacerbation of schizophrenia with suicidal ideation and depression severe. Pt noted recent exacerbation of psychosis and mood. Pt noted he currently feels safe and comfortable on the unit. Pt was in agreement with treatment team.  Pt was polite and cordial during the interview process. Pt agreed to start Depakote requiring Lamictal to be cut in half due to medication titration. Pt was in agreement.     Patient was alert and lucid today during the interview. Speech continues to become clearer. Pt noted he is doing [place] today. \"  Pt noted he is sleeping \"alright. \"  Pt noted his appetite is \"alright. \" Pt denied depression. He endorsed \"a little bit\" of anxiety. Pt  Denied any thoughts to harm himself or anyone else. Pt denied both auditory and visual hallucinations. Hospitalist following - appreciate their assistance. Parameters added to BP meds. Review of the chart notes patient participating in Psychotherapy groups. Physical and Occupational Therapy continue to work with the patient. SW is working on obtaining placement for the patient once ready for discharge. Plan is to discharge to [facility] once they are no longer quarantined - possibly quarantine will be lifted Monday.     AIMS=0  NO TD noted    Allergies   Allergen Reactions    Zyprexa [Olanzapine] Other (See Comments)     Developed severe tardive dyskinesia       Medications Prior to Admission: ziprasidone (GEODON) 60 MG capsule, Take 1 capsule by mouth 2 times daily (with meals)  metoprolol succinate (TOPROL XL) 100 MG extended release tablet, Take 1 tablet by mouth daily  amLODIPine (NORVASC) 10 MG tablet, Take 0.5 tablets by mouth daily  levothyroxine (SYNTHROID) 75 MCG tablet, Take 1 tablet by mouth Daily  melatonin 3 MG TABS tablet, Take 3 mg by mouth daily  VIMPAT no treatment.     Electronically signed by JOSE Farr CNP on 2/23/2020 at 7:08 PM

## 2020-02-24 NOTE — PROGRESS NOTES
Psychiatric Progress Note    Maryse Stallworth  [id number]  02/24/20    CHIEF COMPLAINT: unchanged    HPI: Maryse Stallworth  is a 59 yo [facility] [place] male who presents for exacerbation of schizophrenia with suicidal ideation and depression severe. Pt noted recent exacerbation of psychosis and mood. Pt noted he currently feels safe and comfortable on the unit. Pt was in agreement with treatment team.  Pt was polite and cordial during the interview process. Pt agreed to start Depakote requiring Lamictal to be cut in half due to medication titration. Pt was in agreement.     Patient was alert and lucid today during the interview. Speech continues to become clearer. Pt noted he is doing [place] today. \"  Pt noted he is sleeping \"alright. \"  Pt noted his appetite is \"alright. \" Pt endorsed depression and anxiety as \"a little bit. \" Pt  Denied any thoughts to harm himself or anyone else. Pt denied both auditory and visual hallucinations. Hospitalist following - appreciate their assistance. Parameters added to BP meds. Review of the chart notes patient participating in Psychotherapy groups. Physical and Occupational Therapy continue to work with the patient. SW is working on obtaining placement for the patient once ready for discharge. Plan is to discharge to [facility] once they are no longer quarantined - possibly quarantine will be lifted Monday.     AIMS=0  NO TD noted    Allergies   Allergen Reactions    Zyprexa [Olanzapine] Other (See Comments)     Developed severe tardive dyskinesia       Medications Prior to Admission: ziprasidone (GEODON) 60 MG capsule, Take 1 capsule by mouth 2 times daily (with meals)  metoprolol succinate (TOPROL XL) 100 MG extended release tablet, Take 1 tablet by mouth daily  amLODIPine (NORVASC) 10 MG tablet, Take 0.5 tablets by mouth daily  levothyroxine (SYNTHROID) 75 MCG tablet, Take 1 tablet by mouth Daily  melatonin 3 MG TABS tablet, Take 3 mg by mouth daily  VIMPAT 200 MG tablet, TAKE ONE (1) TABLET BY MOUTH TWO TIMES DAILY  DULoxetine (CYMBALTA) 30 MG extended release capsule, Take 1 capsule by mouth daily  lisinopril (PRINIVIL;ZESTRIL) 10 MG tablet, Take 1 tablet by mouth daily  aspirin-dipyridamole (AGGRENOX)  MG per extended release capsule, Take 1 capsule by mouth nightly   lamoTRIgine (LAMICTAL) 100 MG tablet, Take 250 mg by mouth 2 times daily  atorvastatin (LIPITOR) 80 MG tablet, Take 1 tablet by mouth daily  famotidine (PEPCID) 20 MG tablet, Take 20 mg by mouth nightly  acetaminophen (TYLENOL) 500 MG tablet, Take 1,000 mg by mouth every 4 hours as needed for Pain or Fever  metFORMIN (GLUCOPHAGE) 500 MG tablet, Take 2 tablets by mouth 2 times daily (with meals)  loperamide (IMODIUM) 2 MG capsule, Take 2 mg by mouth every 6 hours as needed for Diarrhea     Past Medical History:   Diagnosis Date    Avascular necrosis of bone (HCC)     right hip    CAD (coronary artery disease)     Coronary atherosclerosis     Dementia     [facility])     Dizziness     cerebelar atrophy    Environmental allergies     Essential hypertension     H/O Doppler ultrasound 07/11/2016    carotid - normal    IBS (irritable bowel syndrome)     Mental disability     Recurrent falls     S/P PTCA (percutaneous transluminal coronary angioplasty) 06/16/2016    Cx OM stent    Seizure disorder ([facility])     Urge incontinence of urine     Wrist joint pain     chronic left        Patient Active Problem List   Diagnosis    Obsessive-compulsive disorder    IBS (irritable bowel syndrome)    Mental disability    Uncontrolled type 2 diabetes mellitus with diabetic polyneuropathy, without long-term current use of insulin (HCC)    Coronary artery disease involving native coronary artery of native heart with unstable angina pectoris (HCC)    Essential hypertension    Dyslipidemia    Ataxia    Late effects of CVA (cerebrovascular accident)    Chronic idiopathic constipation    [facility]izure [facility] ([facility]    Peripheral polyneuropathy    Hypoglycemia associated with diabetes ([facility]    Dysmetria    Coronary atherosclerosis    Urge incontinence of urine    Dementia (HCC)    Recurrent falls    Wrist joint pain    Avascular necrosis of bone (HCC)    AMS (altered mental status)    Acute exacerbation of chronic schizophrenia (HCC)    Schizophrenia ([facility]       Review of Systems    OBJECTIVE  Vital Signs:  Vitals:    02/24/20 1002   BP: 126/66   Pulse: 70   Resp: 14   Temp: 97.2 °F (36.2 °C)   SpO2: 99%       Labs:  Recent Results (from the past 48 hour(s))   POCT Glucose    Collection Time: 02/22/20  5:14 PM   Result Value Ref Range    POC Glucose 132 (H) 70 - 99 MG/DL   POCT Glucose    Collection Time: 02/22/20  7:58 PM   Result Value Ref Range    POC Glucose 156 (H) 70 - 99 MG/DL   POCT Glucose    Collection Time: 02/23/20  8:02 AM   Result Value Ref Range    POC Glucose 111 (H) 70 - 99 MG/DL   POCT Glucose    Collection Time: 02/23/20 11:50 AM   Result Value Ref Range    POC Glucose 104 (H) 70 - 99 MG/DL   POCT Glucose    Collection Time: 02/23/20  4:54 PM   Result Value Ref Range    POC Glucose 132 (H) 70 - 99 MG/DL   POCT Glucose    Collection Time: 02/23/20  8:03 PM   Result Value Ref Range    POC Glucose 114 (H) 70 - 99 MG/DL   POCT Glucose    Collection Time: 02/24/20  7:43 AM   Result Value Ref Range    POC Glucose 104 (H) 70 - 99 MG/DL   POCT Glucose    Collection Time: 02/24/20 11:50 AM   Result Value Ref Range    POC Glucose 96 70 - 99 MG/DL       PSYCHIATRIC ASSESSMENT / MENTAL STATUS EXAM:   Vitals: Blood pressure 126/66, pulse 70, temperature 97.2 °F (36.2 °C), temperature source Temporal, resp. rate 14, height 6' 2\" (1.88 m), weight 195 lb (88.5 kg), SpO2 99 %. CONSTITUTIONAL:    Appearance: appears stated age. Alert and oriented to person only. No acute distress. Adequate grooming and hygiene. Good eye contact. No prominent physical abnormalities. Attitude:  Manner is cooperative and pleasant  Motor: No psychomotor agitation, retardation or abnormal movements noted  Speech: less slurred; normal rate, volume, tone & amount. Language: intact understanding and production  Mood: euthymic  Affect: euthymic, improved range, non-labile, congruent with mood and content of speech  Thought Production: Spontaneous. Thought Form: Noted tangentiality and circumstantiality with flight of ideas and loosening of associations. Thought Content/Perceptions: No WILLIAM, noted AVH, no delusion noted  Insight: questionable  Judgment: questionable  Memory: Immediate, recent, and remote appear intact, though not formally tested. Attention: maintained throughout interview  Fund of knowledge: Average  Gait/Balance: not assessed today    IMPRESSION:   No change in diagnosis        PLAN:  Psychiatric management:medication initiation and titration, group and individual therapy, safe and therapeutic environment. Status of problem/condition: Improving  Medical co-morbidities: Management per [facility] group, appreciate assistance  Legal Status: Pending  Disposition:estimated LOS: Pending. The treatment team reviewed with the patient the diagnosis and treatment recommendations to include the risks, benefits, and side effects of chosen medications. The patient verbalized understanding and agreed with the treatment regimen as outlined above. The patient was encouraged to participate in groups. Medical records, Labs, Diagnotic tests reviewed  q15 min safety checks for safety  Interval History. Review current labs  Continue current medications - appears to be responding favorably to recent medication changes  Supportive Therapy Provided  Pt had an opportunity to ask questions and address concerns  Pt encouraged to continue therapy group or individual.  Pt was in agreement with treatment plan.   The risks benefits and side effects of medications were discussed with the patient, including alternatives and no

## 2020-02-24 NOTE — GROUP NOTE
Group Therapy Note    Date: 2/24/2020    Group Start Time: 1632  Group End Time: 4697  Group Topic: Psychoeducation    [address] [place], [place]        Group Therapy Note    Attendees: 6/8      Notes:  Pts participated in recreational therapy group; Self-Care Discussion. Pts and therapist reviewed a handout that discussed ways to increase positive self-care. These tips included sleep hygiene, healthy eating, importance of exercise, taking time for themselves each day, speaking up for themselves, and the benefits of leisure. Discussion centered around how each self-care tip can help with their mental health . Pt attended group, but spent majority of time sleeping. Status After Intervention:  Unchanged    Participation Level: None    Participation Quality: Lethargic      Speech:  normal      Thought Process/Content: Logical      Affective Functioning: Blunted      Mood: Blunted      Level of consciousness:  Drowsy      Response to Learning: Pt unable to demonstrate response to learning at this time.        Endings: None Reported    Modes of Intervention: Education, Support and Socialization      Discipline Responsible: Certified Therapeutic Recreation Specialist      Signature:  Jeff Macias, [address], [address] [place]

## 2020-02-24 NOTE — PROGRESS NOTES
Patient has been sleepy today due to not getting a good nights sleep last night. There were several patients arguing and yelling which kept patient awake. Patient has been pleasant working on his ADL's.

## 2020-02-24 NOTE — CARE COORDINATION
LSW checked HENS website and PASRR determination still not uploaded. LSW called Pastora with [facility] ([phone]) to get update on status. Voicemail left   LSW then called Melissa with [facility] ([phone]). They will be able to take patients in the morning and would like to come on site patient today. 9:45 AM  LSW received voicemail from [facility] that they were waiting on the HUBER recommendation. LSW was told to call them at [phone]. LSW called and voicemail left on the confidential voicemail of [facility]. LSW awaits call back. 11:30 AM  LSW received call back from [facility]. Nilton Kenyon states that CECILE was not satisfied with the initial report she did, so additional information was provided today. Nilton Kenyon states that hopefully that will meet requirements so they can finish patient's PASRR. LSW is to call her back with any additional questions.

## 2020-02-25 ENCOUNTER — APPOINTMENT (OUTPATIENT)
Dept: GENERAL RADIOLOGY | Age: 65
DRG: 885 | End: 2020-02-25
Attending: PSYCHIATRY & NEUROLOGY
Payer: MEDICARE

## 2020-02-25 VITALS
TEMPERATURE: 97 F | DIASTOLIC BLOOD PRESSURE: 50 MMHG | RESPIRATION RATE: 16 BRPM | BODY MASS INDEX: 25.03 KG/M2 | WEIGHT: 195 LBS | HEIGHT: 74 IN | SYSTOLIC BLOOD PRESSURE: 94 MMHG | OXYGEN SATURATION: 94 % | HEART RATE: 68 BPM

## 2020-02-25 PROBLEM — R41.82 AMS (ALTERED MENTAL STATUS): Status: RESOLVED | Noted: 2019-12-17 | Resolved: 2020-02-25

## 2020-02-25 PROBLEM — F20.9 ACUTE EXACERBATION OF CHRONIC SCHIZOPHRENIA (HCC): Chronic | Status: RESOLVED | Noted: 2020-01-31 | Resolved: 2020-02-25

## 2020-02-25 LAB
GLUCOSE BLD-MCNC: 101 MG/DL (ref 70–99)
GLUCOSE BLD-MCNC: 104 MG/DL (ref 70–99)

## 2020-02-25 PROCEDURE — 99239 HOSP IP/OBS DSCHRG MGMT >30: CPT | Performed by: NURSE PRACTITIONER

## 2020-02-25 PROCEDURE — 6370000000 HC RX 637 (ALT 250 FOR IP): Performed by: INTERNAL MEDICINE

## 2020-02-25 PROCEDURE — 97530 THERAPEUTIC ACTIVITIES: CPT

## 2020-02-25 PROCEDURE — 82962 GLUCOSE BLOOD TEST: CPT

## 2020-02-25 PROCEDURE — 6370000000 HC RX 637 (ALT 250 FOR IP): Performed by: PSYCHIATRY & NEUROLOGY

## 2020-02-25 PROCEDURE — 6370000000 HC RX 637 (ALT 250 FOR IP): Performed by: NURSE PRACTITIONER

## 2020-02-25 RX ORDER — LAMOTRIGINE 150 MG/1
150 TABLET ORAL DAILY
Qty: 30 TABLET | Refills: 1 | Status: SHIPPED | OUTPATIENT
Start: 2020-02-25

## 2020-02-25 RX ORDER — DIVALPROEX SODIUM 500 MG/1
500 TABLET, EXTENDED RELEASE ORAL NIGHTLY
Qty: 30 TABLET | Refills: 1 | Status: SHIPPED | OUTPATIENT
Start: 2020-02-25

## 2020-02-25 RX ORDER — DULOXETIN HYDROCHLORIDE 30 MG/1
90 CAPSULE, DELAYED RELEASE ORAL DAILY
Qty: 90 CAPSULE | Refills: 1 | Status: SHIPPED | OUTPATIENT
Start: 2020-02-26

## 2020-02-25 RX ORDER — AMLODIPINE BESYLATE 5 MG/1
5 TABLET ORAL DAILY
Qty: 30 TABLET | Refills: 0 | Status: SHIPPED | OUTPATIENT
Start: 2020-02-26

## 2020-02-25 RX ORDER — LACOSAMIDE 200 MG/1
200 TABLET ORAL 2 TIMES DAILY
Qty: 60 TABLET | Refills: 0 | Status: SHIPPED | OUTPATIENT
Start: 2020-02-25 | End: 2021-02-24

## 2020-02-25 RX ORDER — LAMOTRIGINE 100 MG/1
100 TABLET ORAL NIGHTLY
Qty: 30 TABLET | Refills: 1 | Status: SHIPPED | OUTPATIENT
Start: 2020-02-25

## 2020-02-25 RX ORDER — BENZTROPINE MESYLATE 1 MG/1
1 TABLET ORAL 2 TIMES DAILY
Qty: 60 TABLET | Refills: 0 | Status: SHIPPED | OUTPATIENT
Start: 2020-02-25

## 2020-02-25 RX ORDER — TAMSULOSIN HYDROCHLORIDE 0.4 MG/1
0.4 CAPSULE ORAL DAILY
Qty: 30 CAPSULE | Refills: 0 | Status: SHIPPED | OUTPATIENT
Start: 2020-02-26

## 2020-02-25 RX ORDER — METOPROLOL SUCCINATE 50 MG/1
50 TABLET, EXTENDED RELEASE ORAL DAILY
Qty: 30 TABLET | Refills: 0 | Status: SHIPPED | OUTPATIENT
Start: 2020-02-26

## 2020-02-25 RX ORDER — LORAZEPAM 0.5 MG/1
0.5 TABLET ORAL 2 TIMES DAILY PRN
Status: DISCONTINUED | OUTPATIENT
Start: 2020-02-25 | End: 2020-02-25 | Stop reason: HOSPADM

## 2020-02-25 RX ORDER — ZIPRASIDONE HYDROCHLORIDE 60 MG/1
60 CAPSULE ORAL NIGHTLY
Qty: 30 CAPSULE | Refills: 1 | Status: SHIPPED | OUTPATIENT
Start: 2020-02-25

## 2020-02-25 RX ORDER — TRAZODONE HYDROCHLORIDE 50 MG/1
50 TABLET ORAL NIGHTLY PRN
Qty: 30 TABLET | Refills: 1 | Status: SHIPPED | OUTPATIENT
Start: 2020-02-25

## 2020-02-25 RX ORDER — ZIPRASIDONE HYDROCHLORIDE 20 MG/1
20 CAPSULE ORAL
Qty: 30 CAPSULE | Refills: 1 | Status: SHIPPED | OUTPATIENT
Start: 2020-02-26

## 2020-02-25 RX ORDER — ERGOCALCIFEROL 1.25 MG/1
50000 CAPSULE ORAL WEEKLY
Qty: 5 CAPSULE | Refills: 0 | Status: SHIPPED | OUTPATIENT
Start: 2020-02-27

## 2020-02-25 RX ADMIN — ZIPRASIDONE HYDROCHLORIDE 20 MG: 20 CAPSULE ORAL at 08:07

## 2020-02-25 RX ADMIN — BENZTROPINE MESYLATE 1 MG: 1 TABLET ORAL at 08:07

## 2020-02-25 RX ADMIN — LEVOTHYROXINE SODIUM 75 MCG: 0.07 TABLET ORAL at 08:07

## 2020-02-25 RX ADMIN — DULOXETINE HYDROCHLORIDE 90 MG: 30 CAPSULE, DELAYED RELEASE ORAL at 08:08

## 2020-02-25 RX ADMIN — LAMOTRIGINE 125 MG: 100 TABLET ORAL at 08:06

## 2020-02-25 RX ADMIN — AMLODIPINE BESYLATE 5 MG: 5 TABLET ORAL at 08:07

## 2020-02-25 RX ADMIN — LISINOPRIL 10 MG: 10 TABLET ORAL at 08:08

## 2020-02-25 RX ADMIN — LORAZEPAM 0.5 MG: 0.5 TABLET ORAL at 08:07

## 2020-02-25 RX ADMIN — TAMSULOSIN HYDROCHLORIDE 0.4 MG: 0.4 CAPSULE ORAL at 08:07

## 2020-02-25 RX ADMIN — LACOSAMIDE 200 MG: 100 TABLET, FILM COATED ORAL at 08:07

## 2020-02-25 RX ADMIN — ATORVASTATIN CALCIUM 80 MG: 40 TABLET, FILM COATED ORAL at 08:07

## 2020-02-25 RX ADMIN — METOPROLOL SUCCINATE 50 MG: 50 TABLET, EXTENDED RELEASE ORAL at 08:07

## 2020-02-25 ASSESSMENT — PAIN SCALES - GENERAL: PAINLEVEL_OUTOF10: 0

## 2020-02-25 NOTE — GROUP NOTE
Group Therapy Note    Date: 2/25/2020    Group Start Time: 0830  Group End Time: 0900    Number of Participants: 6/9    Type: Morning Goals Group/ Community Meeting    Group Topic/Objective: Set Goal For The Day and to review Unit Rules and Regulations. Patient's Goal:  Pt states his goal today is \"Listen to music. \"    Notes:  Pt presented as lethargic during group and spent majority of time sleeping. Pt expressed feeling tired and feeling grateful for \"I'm here. \"  Pt struggled to provide numerical values when asked. Depression (0-10): Pt states \"a little. \"    Anxiety (0-10): 0    Irritability/Aggitation (0-10): 0    Status After Intervention:  Unchanged    Participation Level: Minimal    Participation Quality: Lethargic    Speech:  normal    Thought Process/Content: Logical    Affective Functioning: Blunted    Mood: Blunted    Level of consciousness:  Drowsy    Response to Learning: Able to verbalize current knowledge/experience    Endings: None Reported    Modes of Intervention: Education, Support and Socialization    Discipline Responsible: Certified Therapeutic Recreation Specialist     Electronically signed by Shade Mason0 ZACHERY Vinod Adan MA on 2/25/2020 at 9:13 AM

## 2020-02-25 NOTE — PROGRESS NOTES
Patient transported by stretcher to [facility] per [facility], all belongings sent with patient. Patient was alert and aware of transport and why he was going.

## 2020-02-25 NOTE — PROGRESS NOTES
Night shift reported patient did not sleep well last night and has been up since around 6am.  Patient ate all of his breakfast, participated in morning goals groups and watched a little TV. Patient then stated he had a headache. I took patient back to his room and encouraged him to take a nap and see if that helped the headache,  Patient was agreeable and is now laying down in bed.

## 2020-02-25 NOTE — BH NOTE
Focus Note    General Observations: Pt appears to be Cooperative and Pleasant during the shift today. Pt has been compliant with medication and denies side effects. Pt denies pain this shift. Pt transfers with assistance of one staff member, pt is unsteady and forgets limitations at times. Chair and bed alarms are utilized. Pt incontinent of urine. Pt is currently resting in bed, will continue to monitor.          Appetite: normal    Suicidal Ideations: No    Homicidal Ideations: No    Hallucinations:  absent     Delusions:  absent       Allyson Mckoy LPN

## 2020-02-25 NOTE — DISCHARGE SUMMARY
follow up with out mental health. Pt was very optimistic about his D/C and going to [facility]. Pt stated that he was doing \"good,\" today. Pt stated that he slept \"alright\" last night. Pt stated that his appetite is \"good. \"  Pt stated that he rates his depression a  \"0,\" on a scale of 0-10 with 10 being the worst and 0 being none. Pt stated  that he rates his anxiety an \"1/10,\" on the same scale. Pt denies any auditory  or visual hallucinations. Pt denied any thoughts to harm himself or anyone else. Pt felt safe and comfortable for D/C. The Pt was educated primarily by verbal means about his diagnoses and their  manifestations in his life. The option for treatment including group individual  therapy programming was offered to him and the use of medications with all their  potential risks, benefits, and side-effects were discussed with the pt at  length. Pt was given the opportunity to ask questions and he participated in the  treatment and planning process. Pt felt ready and eager to be discharged from  the from the [facility] unit. Pt felt he was safe for this disposition. Pt was considered to be able to  participate in informed consent and decision-making with respect to medical,  legal and financial issues at the time of his discharge from the [facility]. Complications: none    Treatment options, medications and alternatives reviewed with Lynne Swenson and they agree with the plan to discharge. Discharge on diabetic diet, continue activity as tolerated. Patient appears to be in stable condition and close to their baseline functioning. The patient denies suicidal or homicidal ideations and is showing future orientation. Patient no longer presented an imminent risk of danger to themselves and/or others. At the time of discharge it appears that the patient has received the maximum medical benefit from this hospitalization and can be appropriately managed with community treatment. capsule  · ziprasidone 60 MG capsule         Social History     Socioeconomic History    Marital status: Single     Spouse name: Not on file    Number of children: Not on file    Years of education: Not on file    Highest education level: Not on file   Occupational History    Not on file   Social Needs    Financial resource strain: Not on file    Food insecurity:     Worry: Not on file     Inability: Not on file    Transportation needs:     Medical: Not on file     Non-medical: Not on file   Tobacco Use    Smoking status: Never Smoker    Smokeless tobacco: Never Used   Substance and Sexual Activity    Alcohol use: No    Drug use: No    Sexual activity: Not Currently     Partners: Female   Lifestyle    Physical activity:     Days per week: Not on file     Minutes per session: Not on file    Stress: Not on file   Relationships    Social connections:     Talks on phone: Not on file     Gets together: Not on file     Attends Worship service: Not on file     Active member of club or organization: Not on file     Attends meetings of clubs or organizations: Not on file     Relationship status: Not on file    Intimate partner violence:     Fear of current or ex partner: Not on file     Emotionally abused: Not on file     Physically abused: Not on file     Forced sexual activity: Not on file   Other Topics Concern    Not on file   Social History Narrative    Not on file       Past Medical History:   Diagnosis Date    Avascular necrosis of bone ([facility] [address]     right hip    CAD (coronary artery disease)     Coronary atherosclerosis     Dementia     Dementia ([facility] [address]     Dizziness     cerebelar atrophy    Environmental allergies     Essential hypertension     H/O Doppler ultrasound 07/11/2016    carotid - normal    IBS (irritable bowel syndrome)     Mental disability     Recurrent falls     S/P PTCA (percutaneous transluminal coronary angioplasty) 06/16/2016    Cx OM stent    Seizure disorder ([facility] [address] Allergies   Allergen Reactions    Zyprexa [Olanzapine] Other (See Comments)     Developed severe tardive dyskinesia        The patient verbalized understanding and agreement with the above information    LEGAL STATUS ON DISCHARGE:  Voluntary    DISCHARGE DISPOSITION:  [facility]    DISCHARGE CONDITION:  Stable for outpatient treatment    DISCHARGE DIET:  Resume previous home diet    ACTI VITES:  As tolerated    FOLLOW UP APPOINTMENT(S):  As noted in the After Visit Summary (AVS)    CONSULTS:  [address] Directive POA was offered and reviewed with pt:      Talked to pts poa,went over patients benefits and other matters. Reviewed financial options /programs etc.... CORE MEASURES    -Was the patient discharged on two or more Antipsychotics? NO  -If multiple Antipsychotic medications prescribed,is tapering recommended? NA    ? If yes, document plan: NA  ?  -If multiple Antipsychotic medications prescribed at discharge, is cross tapering in process at D/C? NA  -Have there been 3 or more failed attempts of mono therapy? NA  -If yes, list at least 3 of the failed Antipsychotic medications with the reason why each one failed: ?NA    -Was Hemoglobin A1C or fasting Glucose measure done within the last 12 months? YES  -Lipid Panel measure done within the last 12 months? YES  -Pt was offered an FDA approved medication for Chemical Dependency: (offered refused/ordered) NA  -Pt was offered for discharged on tobacco/nicotine cessation and/or codependency cessation via medication assistance: (offered refused/ordered) NA    More than 30 mins was spent face to face with the patient to discuss the diagnosis, treatment recommendations, and prognosis. Safety planning was also reviewed. The patient agreed to go to the nearest ER or call 911 if they experienced an emergency    The patient was admitted to the psychiatric unit and monitored for stabilization.  A multidisciplinary team met with the patient on a daily basis. The diagnosis, treatment recommendations, and prognosis were reviewed with the patient.       Objective:  Vital signs in last 24 hours:  Vitals:    02/25/20 0745   BP: (!) 94/50   Pulse: 68   Resp: 16   Temp: 97 °F (36.1 °C)   SpO2: 94%       Labs:      Recent Results (from the past 504 hour(s))   POCT Glucose    Collection Time: 02/04/20 11:33 AM   Result Value Ref Range    POC Glucose 125 (H) 70 - 99 MG/DL   POCT Glucose    Collection Time: 02/04/20 11:45 AM   Result Value Ref Range    POC Glucose 122 (H) 70 - 99 MG/DL   POCT Glucose    Collection Time: 02/04/20  4:41 PM   Result Value Ref Range    POC Glucose 109 (H) 70 - 99 MG/DL   POCT Glucose    Collection Time: 02/04/20  8:08 PM   Result Value Ref Range    POC Glucose 134 (H) 70 - 99 MG/DL   Comprehensive Metabolic Panel w/ Reflex to MG    Collection Time: 02/05/20  6:00 AM   Result Value Ref Range    Sodium 136 135 - 145 MMOL/L    Potassium 4.4 3.5 - 5.1 MMOL/L    Chloride 99 99 - 110 mMol/L    CO2 22 21 - 32 MMOL/L    BUN 15 6 - 23 MG/DL    CREATININE 1.2 0.9 - 1.3 MG/DL    Glucose 135 (H) 70 - 99 MG/DL    Calcium 9.2 8.3 - 10.6 MG/DL    Alb 4.1 3.4 - 5.0 GM/DL    Total Protein 6.6 6.4 - 8.2 GM/DL    Total Bilirubin 0.6 0.0 - 1.0 MG/DL    ALT 38 10 - 40 U/L    AST 22 15 - 37 IU/L    Alkaline Phosphatase 45 40 - 129 IU/L    GFR Non-African American >60 >60 mL/min/1.73m2    GFR African American >60 >60 mL/min/1.73m2    Anion Gap 15 4 - 16   Amylase    Collection Time: 02/05/20  6:00 AM   Result Value Ref Range    Amylase 109 25 - 115 U/L   Lipase    Collection Time: 02/05/20  6:00 AM   Result Value Ref Range    Lipase 61 (H) 13 - 60 IU/L   POCT Glucose    Collection Time: 02/05/20  7:46 AM   Result Value Ref Range    POC Glucose 128 (H) 70 - 99 MG/DL   POCT Glucose    Collection Time: 02/05/20 12:19 PM   Result Value Ref Range    POC Glucose 155 (H) 70 - 99 MG/DL   POCT Glucose    Collection Time: 02/05/20  5:01 PM   Result Value Ref Glucose    Collection Time: 02/09/20 11:58 AM   Result Value Ref Range    POC Glucose 102 (H) 70 - 99 MG/DL   POCT Glucose    Collection Time: 02/09/20  5:02 PM   Result Value Ref Range    POC Glucose 111 (H) 70 - 99 MG/DL   POCT Glucose    Collection Time: 02/09/20  7:49 PM   Result Value Ref Range    POC Glucose 146 (H) 70 - 99 MG/DL   POCT Glucose    Collection Time: 02/10/20  7:54 AM   Result Value Ref Range    POC Glucose 93 70 - 99 MG/DL   POCT Glucose    Collection Time: 02/10/20 11:56 AM   Result Value Ref Range    POC Glucose 113 (H) 70 - 99 MG/DL   POCT Glucose    Collection Time: 02/10/20  5:12 PM   Result Value Ref Range    POC Glucose 120 (H) 70 - 99 MG/DL   Comprehensive Metabolic Panel w/ Reflex to MG    Collection Time: 02/10/20  5:30 PM   Result Value Ref Range    Sodium 140 135 - 145 MMOL/L    Potassium 4.6 3.5 - 5.1 MMOL/L    Chloride 99 99 - 110 mMol/L    CO2 23 21 - 32 MMOL/L    BUN 13 6 - 23 MG/DL    CREATININE 1.1 0.9 - 1.3 MG/DL    Glucose 144 (H) 70 - 99 MG/DL    Calcium 9.9 8.3 - 10.6 MG/DL    Alb 4.5 3.4 - 5.0 GM/DL    Total Protein 7.1 6.4 - 8.2 GM/DL    Total Bilirubin 0.3 0.0 - 1.0 MG/DL    ALT 25 10 - 40 U/L    AST 21 15 - 37 IU/L    Alkaline Phosphatase 52 40 - 129 IU/L    GFR Non-African American >60 >60 mL/min/1.73m2    GFR African American >60 >60 mL/min/1.73m2    Anion Gap 18 (H) 4 - 16   Valproic acid level, total and free    Collection Time: 02/10/20  5:30 PM   Result Value Ref Range    Valproic Acid, Free 24 (H) 7 - 23 ug/mL    Valproic Acid, Total 108 50 - 125 ug/mL    Valproic Acid % Free 22 (H) 5 - 18 %    Valproic Acid % Free (H) 5 - 18 %     (NOTE)  INTERPRETIVE INFORMATION: VPA-percent Free  Valproic Acid, Total   Therapeutic Range:  ug/mL  Toxic: Greater than 150 ug/mL  Valproic Acid, Free   Therapeutic Range: 7-23 ug/mL  Toxic: Greater than 30 ug/mL  VPA-percent Free   Therapeutic Range: 5-18 percent  Free valproic acid may be important to monitor in patients with altered or unpredictable protein binding capacity because valproic   acid exhibits variable, dose-dependent protein binding. Valproic   acid is also subject to drug-drug interactions due to displacement   of protein binding. Calculating percent free attempts to minimize   differences in test cross-reactivity and may be useful in dose   optimization. Adverse effects may include headache, somnolence and   dizziness. Performed by [address], [id number] [facility] [phone]  www. Deb Flynn MD, Lab. Director     Lamotrigine level    Collection Time: 02/10/20  5:30 PM   Result Value Ref Range    Lamotrigine Lvl 12.5 2.5 - 15.0 ug/mL    Lamotrigine Lvl  2.5 - 15.0 ug/mL     (NOTE)  INTERPRETIVE INFORMATION:  Lamotrigine  Therapeutic Range:  2.5-15.0 ug/mL             Toxic:  Not well established  Pharmacokinetics varies widely, particularly with co-medications   and/or compromised renal function. Adverse effects may include   dizziness, somnolence, nausea and vomiting. Performed by [address], [address] [facility] [phone]  www. Deb Flynn MD, Lab.  Director     Ammonia    Collection Time: 02/10/20  5:35 PM   Result Value Ref Range    Ammonia 32 16 - 60 UMOL/L   POCT Glucose    Collection Time: 02/10/20  8:15 PM   Result Value Ref Range    POC Glucose 129 (H) 70 - 99 MG/DL   POCT Glucose    Collection Time: 02/11/20  7:37 AM   Result Value Ref Range    POC Glucose 100 (H) 70 - 99 MG/DL   POCT Glucose    Collection Time: 02/11/20 11:52 AM   Result Value Ref Range    POC Glucose 121 (H) 70 - 99 MG/DL   POCT Glucose    Collection Time: 02/11/20  5:01 PM   Result Value Ref Range    POC Glucose 124 (H) 70 - 99 MG/DL   POCT Glucose    Collection Time: 02/11/20  8:21 PM   Result Value Ref Range    POC Glucose 147 (H) 70 - 99 MG/DL   CBC auto differential    Collection Time: 02/12/20  6:00 AM   Result Value Ref Range    WBC 5.9 4.0 - 10.5 K/CU MM    RBC 4. 81 4.6 - 6.2 M/CU MM    Hemoglobin 14.9 13.5 - 18.0 GM/DL    Hematocrit 45.3 42 - 52 %    MCV 94.2 78 - 100 FL    MCH 31.0 27 - 31 PG    MCHC 32.9 32.0 - 36.0 %    RDW 12.2 11.7 - 14.9 %    Platelets 935 286 - 502 K/CU MM    MPV 9.7 7.5 - 11.1 FL    Differential Type AUTOMATED DIFFERENTIAL     Segs Relative 59.5 36 - 66 %    Lymphocytes % 30.9 24 - 44 %    Monocytes % 8.9 (H) 0 - 4 %    Eosinophils % 0.2 0 - 3 %    Basophils % 0.2 0 - 1 %    Segs Absolute 3.5 K/CU MM    Lymphocytes Absolute 1.8 K/CU MM    Monocytes Absolute 0.5 K/CU MM    Eosinophils Absolute 0.0 K/CU MM    Basophils Absolute 0.0 K/CU MM    TRC 0.0462 K/CU MM    Immature Neutrophil % 0.3 0 - 0.43 %    Total Immature Neutrophil 0.02 K/CU MM   POCT Glucose    Collection Time: 02/12/20  7:29 AM   Result Value Ref Range    POC Glucose 129 (H) 70 - 99 MG/DL   POCT Glucose    Collection Time: 02/12/20 12:13 PM   Result Value Ref Range    POC Glucose 124 (H) 70 - 99 MG/DL   POCT Glucose    Collection Time: 02/12/20  4:56 PM   Result Value Ref Range    POC Glucose 110 (H) 70 - 99 MG/DL   POCT Glucose    Collection Time: 02/12/20  8:27 PM   Result Value Ref Range    POC Glucose 105 (H) 70 - 99 MG/DL   POCT Glucose    Collection Time: 02/13/20  7:46 AM   Result Value Ref Range    POC Glucose 118 (H) 70 - 99 MG/DL   POCT Glucose    Collection Time: 02/13/20 11:49 AM   Result Value Ref Range    POC Glucose 100 (H) 70 - 99 MG/DL   POCT Glucose    Collection Time: 02/13/20  4:18 PM   Result Value Ref Range    POC Glucose 122 (H) 70 - 99 MG/DL   POCT Glucose    Collection Time: 02/13/20  7:37 PM   Result Value Ref Range    POC Glucose 152 (H) 70 - 99 MG/DL   POCT Glucose    Collection Time: 02/14/20 12:18 PM   Result Value Ref Range    POC Glucose 110 (H) 70 - 99 MG/DL   POCT Glucose    Collection Time: 02/14/20  4:37 PM   Result Value Ref Range    POC Glucose 119 (H) 70 - 99 MG/DL   POCT Glucose    Collection Time: 02/14/20  8:04 PM   Result Value Ref Range POC Glucose 149 (H) 70 - 99 MG/DL   POCT Glucose    Collection Time: 02/15/20  7:31 AM   Result Value Ref Range    POC Glucose 106 (H) 70 - 99 MG/DL   POCT Glucose    Collection Time: 02/15/20 12:10 PM   Result Value Ref Range    POC Glucose 81 70 - 99 MG/DL   POCT Glucose    Collection Time: 02/15/20  4:40 PM   Result Value Ref Range    POC Glucose 109 (H) 70 - 99 MG/DL   POCT Glucose    Collection Time: 02/15/20  7:41 PM   Result Value Ref Range    POC Glucose 120 (H) 70 - 99 MG/DL   POCT Glucose    Collection Time: 02/16/20  7:18 AM   Result Value Ref Range    POC Glucose 89 70 - 99 MG/DL   POCT Glucose    Collection Time: 02/16/20 11:48 AM   Result Value Ref Range    POC Glucose 92 70 - 99 MG/DL   POCT Glucose    Collection Time: 02/16/20  4:40 PM   Result Value Ref Range    POC Glucose 123 (H) 70 - 99 MG/DL   POCT Glucose    Collection Time: 02/16/20  7:34 PM   Result Value Ref Range    POC Glucose 157 (H) 70 - 99 MG/DL   POCT Glucose    Collection Time: 02/17/20  7:28 AM   Result Value Ref Range    POC Glucose 98 70 - 99 MG/DL   POCT Glucose    Collection Time: 02/17/20 11:51 AM   Result Value Ref Range    POC Glucose 104 (H) 70 - 99 MG/DL   POCT Glucose    Collection Time: 02/17/20  4:47 PM   Result Value Ref Range    POC Glucose 105 (H) 70 - 99 MG/DL   POCT Glucose    Collection Time: 02/17/20  7:55 PM   Result Value Ref Range    POC Glucose 157 (H) 70 - 99 MG/DL   Valproic acid level, total and free    Collection Time: 02/18/20  6:50 AM   Result Value Ref Range    Valproic Acid, Free <7 (L) 7 - 23 ug/mL    Valproic Acid, Total 28 (L) 50 - 125 ug/mL    Valproic Acid % Free Not Applicable 5 - 18 %    Valproic Acid % Free  5 - 18 %     (NOTE)  INTERPRETIVE INFORMATION: VPA-percent Free  Valproic Acid, Total   Therapeutic Range:  ug/mL  Toxic: Greater than 150 ug/mL  Valproic Acid, Free   Therapeutic Range: 7-23 ug/mL  Toxic: Greater than 30 ug/mL  VPA-percent Free   Therapeutic Range: 5-18 percent  Free valproic acid may be important to monitor in patients with   altered or unpredictable protein binding capacity because valproic   acid exhibits variable, dose-dependent protein binding. Valproic   acid is also subject to drug-drug interactions due to displacement   of protein binding. Calculating percent free attempts to minimize   differences in test cross-reactivity and may be useful in dose   optimization. Adverse effects may include headache, somnolence and   dizziness. Performed by [facility] [address]en , [address] [facility] [phone]  www. Darwin Nicholson MD, Lab. Director     POCT Glucose    Collection Time: 02/18/20  7:27 AM   Result Value Ref Range    POC Glucose 103 (H) 70 - 99 MG/DL   POCT Glucose    Collection Time: 02/18/20 11:59 AM   Result Value Ref Range    POC Glucose 91 70 - 99 MG/DL   POCT Glucose    Collection Time: 02/18/20  4:59 PM   Result Value Ref Range    POC Glucose 142 (H) 70 - 99 MG/DL   POCT Glucose    Collection Time: 02/18/20  8:12 PM   Result Value Ref Range    POC Glucose 102 (H) 70 - 99 MG/DL   POCT Glucose    Collection Time: 02/19/20  1:57 AM   Result Value Ref Range    POC Glucose 159 (H) 70 - 99 MG/DL   POCT Glucose    Collection Time: 02/19/20  7:33 AM   Result Value Ref Range    POC Glucose 97 70 - 99 MG/DL   POCT Glucose    Collection Time: 02/19/20 11:56 AM   Result Value Ref Range    POC Glucose 84 70 - 99 MG/DL   Valproic acid level, total    Collection Time: 02/19/20  3:00 PM   Result Value Ref Range    Valproic Acid Lvl 34.5 (L) 50 - 100 UG/ML    DOSE AMOUNT DOSE AMT.  GIVEN - 500 mg     DOSE TIME DOSE TIME GIVEN - hs    Comprehensive Metabolic Panel w/ Reflex to MG    Collection Time: 02/19/20  3:00 PM   Result Value Ref Range    Sodium 139 135 - 145 MMOL/L    Potassium 4.2 3.5 - 5.1 MMOL/L    Chloride 99 99 - 110 mMol/L    CO2 30 21 - 32 MMOL/L    BUN 10 6 - 23 MG/DL    CREATININE 1.2 0.9 - 1.3 MG/DL    Glucose 162 (H) 70 - 99 MG/DL    Calcium 9.7 Glucose    Collection Time: 02/23/20 11:50 AM   Result Value Ref Range    POC Glucose 104 (H) 70 - 99 MG/DL   POCT Glucose    Collection Time: 02/23/20  4:54 PM   Result Value Ref Range    POC Glucose 132 (H) 70 - 99 MG/DL   POCT Glucose    Collection Time: 02/23/20  8:03 PM   Result Value Ref Range    POC Glucose 114 (H) 70 - 99 MG/DL   POCT Glucose    Collection Time: 02/24/20  7:43 AM   Result Value Ref Range    POC Glucose 104 (H) 70 - 99 MG/DL   POCT Glucose    Collection Time: 02/24/20 11:50 AM   Result Value Ref Range    POC Glucose 96 70 - 99 MG/DL   POCT Glucose    Collection Time: 02/24/20  4:35 PM   Result Value Ref Range    POC Glucose 105 (H) 70 - 99 MG/DL   POCT Glucose    Collection Time: 02/24/20  7:40 PM   Result Value Ref Range    POC Glucose 152 (H) 70 - 99 MG/DL   POCT Glucose    Collection Time: 02/25/20  7:30 AM   Result Value Ref Range    POC Glucose 104 (H) 70 - 99 MG/DL       40 Minutes    Electronically signed by JOSE Maldonado CNP on 2/25/2020 at 11:13 AM

## 2020-02-25 NOTE — DISCHARGE INSTR - COC
Continuity of Care Form    Patient Name: Junior Aguilar   :  1955  MRN:  [id number]    Admit date:  2020  Discharge date:  2020    Code Status Order: Full Code   Advance Directives:   Advance Care Flowsheet Documentation     Date/Time Healthcare Directive Type of Healthcare Directive Copy in [address] Agent's Name Healthcare Agent's Phone Number    20 1005  No, patient does not have an advance directive for healthcare treatment -- -- -- -- --    20 0047  No, patient does not have an advance directive for healthcare treatment -- -- -- -- --          Admitting Physician:  Deneen West DO  PCP: Jeff Ty MD    Discharging Nurse: [facility]/Room#: 4608/2348-19  Discharging Unit Phone Number: [phone]    Emergency Contact:   Extended Emergency Contact Information  Primary Emergency Contact: Zohra Blandon   [address] Phone: [phone]  Relation: Other  Secondary Emergency Contact: Imelda Norton [facility] Phone: [phone]  Relation: Lay Caregiver    Past Surgical History:  Past Surgical History:   Procedure Laterality Date    CARDIAC SURGERY      JOINT REPLACEMENT      hip replacement left    ORIF FEMUR DECOMPRESSION  2007    left     PTCA  2016    Cx OM stent       Immunization History:   Immunization History   Administered Date(s) Administered    Influenza, High Dose (Fluzone 65 yrs and older) 10/06/2011, 2016, 10/09/2017    Influenza, Quadv, IM, PF (6 mo and older Fluzone, Flulaval, Fluarix, and 3 yrs and older Afluria) 2018, 2019    Pneumococcal Polysaccharide ([id number]) 10/06/2011, 2016       Active Problems:  Patient Active Problem List   Diagnosis Code    Obsessive-compulsive disorder F42.9    IBS (irritable bowel syndrome) K58.9    Mental disability F79    Uncontrolled type 2 diabetes mellitus with diabetic polyneuropathy, without

## 2020-02-25 NOTE — PLAN OF CARE
Problem: Falls - Risk of:  Goal: Will remain free from falls  Description  Will remain free from falls  2/25/2020 1232 by Glen Leigh LPN  Outcome: Completed  2/25/2020 0108 by Flores Rivera LPN  Outcome: Ongoing  Goal: Absence of physical injury  Description  Absence of physical injury  2/25/2020 1232 by Glen Leigh LPN  Outcome: Completed  2/25/2020 0108 by Flores Rivera LPN  Outcome: Ongoing     Problem: Altered Mood, Psychotic Behavior:  Goal: Able to demonstrate trust by eating, participating in treatment and following staff's direction  Description  Able to demonstrate trust by eating, participating in treatment and following staff's direction  2/25/2020 1232 by lGen Leigh LPN  Outcome: Completed  2/25/2020 0108 by Flores Rivera LPN  Outcome: Ongoing  Goal: Able to verbalize decrease in frequency and intensity of hallucinations  Description  Able to verbalize decrease in frequency and intensity of hallucinations  2/25/2020 1232 by Glen Leigh LPN  Outcome: Completed  2/25/2020 0108 by Flores Rivera LPN  Outcome: Ongoing  Goal: Able to verbalize reality based thinking  Description  Able to verbalize reality based thinking  2/25/2020 1232 by Glen Leigh LPN  Outcome: Completed  2/25/2020 0108 by Flores Rivera LPN  Outcome: Ongoing  Goal: Absence of self-harm  Description  Absence of self-harm  2/25/2020 1232 by Glen Leigh LPN  Outcome: Completed  2/25/2020 0108 by Flores Rivera LPN  Outcome: Ongoing  Goal: Ability to achieve adequate nutritional intake will improve  Description  Ability to achieve adequate nutritional intake will improve  2/25/2020 1232 by Glen Leigh LPN  Outcome: Completed  2/25/2020 0108 by Flores Rivera LPN  Outcome: Ongoing  Goal: Ability to interact with others will improve  Description  Ability to interact with others will improve  2/25/2020 1232 by Glen Leigh LPN  Outcome: Completed  2/25/2020 0108 by Flores Rivera LPN  Outcome: Ongoing  Goal: Compliance with

## 2020-02-25 NOTE — CARE COORDINATION
8:15am - Faxed PASRR results to [facility] and [address] [place] [address] and Rehab to confirm they received the PASRR. Spoke with Amado Jordan and she stated that they did receive PASRR and that Health system would be calling to confirm if they will accept patient. Awaiting call back from Health system. 10:00am- Scheduled transportation to & for 4:00pm today. Left a message for Health system at [facility] with the time. 10:05am- Health system returned call and requested Nurses call report to ([phone]) and to fax discharge summary to ([phone]). 10:21am- Called and spoke with Zohra townsend emergency contact to notify of upcoming discharge. No follow up appointments set at this time because patient's facility will maintain medication management. 2:30pm Faxed discharge summary to [facility] and Rehab.

## 2020-02-25 NOTE — PROGRESS NOTES
Report called to Kamilah Song at [facility]. All questions answered. Patient's belongings are packed and ready for . Patient reports no SI/HI or AVH and rates his anxiety and depression 2/10.

## 2020-03-12 ENCOUNTER — HOSPITAL ENCOUNTER (EMERGENCY)
Age: 65
Discharge: HOME | End: 2020-03-12
Attending: FAMILY MEDICINE
Payer: MEDICARE

## 2020-03-12 ENCOUNTER — APPOINTMENT (OUTPATIENT)
Dept: CT IMAGING | Age: 65
End: 2020-03-12
Payer: MEDICARE

## 2020-03-12 VITALS
RESPIRATION RATE: 18 BRPM | OXYGEN SATURATION: 92 % | HEART RATE: 91 BPM | BODY MASS INDEX: 30.01 KG/M2 | DIASTOLIC BLOOD PRESSURE: 71 MMHG | TEMPERATURE: 99.1 F | WEIGHT: 198 LBS | HEIGHT: 68 IN | SYSTOLIC BLOOD PRESSURE: 106 MMHG

## 2020-03-12 PROCEDURE — 70450 CT HEAD/BRAIN W/O DYE: CPT

## 2020-03-12 PROCEDURE — 4500000027

## 2020-03-12 PROCEDURE — 72125 CT NECK SPINE W/O DYE: CPT

## 2020-03-12 PROCEDURE — 99284 EMERGENCY DEPT VISIT MOD MDM: CPT

## 2020-03-12 NOTE — ED NOTES
Call Hugo Duran at [phone] and report given. Checking with their transport will call back.       Whit Sanders RN  03/12/20 6215

## 2020-03-12 NOTE — ED PROVIDER NOTES
[address] y 76 E  [address] [address]  Dept: [phone]  Dept Fax: [phone]  Loc: [address] [place] [address] Road COMPLAINT    Chief Complaint   Patient presents with    Fall     Hit head lac above right eye brow       HPI    Susana Young is a 60 y.o. male who presents with a head injury. Onset was this morning. Patient is a frequent fall risk at his nursing home where he is a patient. Today's fall is different and that he had a head and he had a forehead laceration so he was sent in for evaluation. Patient himself is without complaint. He denies headache. He denies neck pain. He denies arm weakness or radicular symptoms. He denies chest pain palpitations. He denies loss of consciousness. He denies nausea or vomiting. He states \"I feel fine\". REVIEW OF SYSTEMS    Neurologic: see HPI, No extremity weakness, no LOC  Musculoskeletal: see HPI  Cardiac: No chest pain or chest injury  Respiratory: No difficulty breathing  GI: No abdominal pain or abdominal injury  : No dysuria or hematuria  General: No fever  All other systems reviewed and are negative.      PAST MEDICAL & SURGICAL HISTORY    Past Medical History:   Diagnosis Date    Avascular necrosis of bone ([address]     right hip    CAD (coronary artery disease)     Coronary atherosclerosis     Dementia     Dementia (HCC)     Dizziness     cerebelar atrophy    Environmental allergies     Essential hypertension     H/O Doppler ultrasound 07/11/2016    carotid - normal    IBS (irritable bowel syndrome)     Mental disability     Recurrent falls     S/P PTCA (percutaneous transluminal coronary angioplasty) 06/16/2016    Cx OM stent    Seizure disorder (HCC)     Urge incontinence of urine     Wrist joint pain     chronic left     Past Surgical History:   Procedure Laterality Date    CARDIAC SURGERY      JOINT REPLACEMENT      hip replacement left    ORIF FEMUR DECOMPRESSION  06/2007    left     PTCA  06/16/2016    Cx OM stent       CURRENT MEDICATIONS  (may include discharge medications prescribed in the ED)  Current Outpatient Rx   Medication Sig Dispense Refill    divalproex (DEPAKOTE ER) 500 MG extended release tablet Take 1 tablet by mouth nightly 30 tablet 1    lamoTRIgine (LAMICTAL) 150 MG tablet Take 1 tablet by mouth daily 30 tablet 1    lacosamide (VIMPAT) 200 MG tablet Take 1 tablet by mouth 2 times daily.  60 tablet 0    DULoxetine (CYMBALTA) 30 MG extended release capsule Take 3 capsules by mouth daily 90 capsule 1    traZODone (DESYREL) 50 MG tablet Take 1 tablet by mouth nightly as needed for Sleep 30 tablet 1    benztropine (COGENTIN) 1 MG tablet Take 1 tablet by mouth 2 times daily 60 tablet 0    ziprasidone (GEODON) 20 MG capsule Take 1 capsule by mouth daily (with breakfast) 30 capsule 1    ziprasidone (GEODON) 60 MG capsule Take 1 capsule by mouth nightly 30 capsule 1    metoprolol succinate (TOPROL XL) 50 MG extended release tablet Take 1 tablet by mouth daily 30 tablet 0    amLODIPine (NORVASC) 5 MG tablet Take 1 tablet by mouth daily 30 tablet 0    tamsulosin (FLOMAX) 0.4 MG capsule Take 1 capsule by mouth daily 30 capsule 0    vitamin D (ERGOCALCIFEROL) 1.25 MG (62530 UT) CAPS capsule Take 1 capsule by mouth once a week 5 capsule 0    lamoTRIgine (LAMICTAL) 100 MG tablet Take 1 tablet by mouth nightly 30 tablet 1    levothyroxine (SYNTHROID) 75 MCG tablet Take 1 tablet by mouth Daily 30 tablet 3    melatonin 3 MG TABS tablet Take 3 mg by mouth daily      lisinopril (PRINIVIL;ZESTRIL) 10 MG tablet Take 1 tablet by mouth daily 30 tablet 0    aspirin-dipyridamole (AGGRENOX)  MG per extended release capsule Take 1 capsule by mouth nightly       metFORMIN (GLUCOPHAGE) 500 MG tablet Take 2 tablets by mouth 2 times daily (with meals) 120 tablet 3    atorvastatin (LIPITOR) 80 MG tablet Take 1 tablet by mouth daily 90 tablet 3       ALLERGIES    Allergies   Allergen Reactions    Zyprexa [Olanzapine] Other (See Comments)     Developed severe tardive dyskinesia       SOCIAL & FAMILY HISTORY    Social History     Socioeconomic History    Marital status: Single     Spouse name: None    Number of children: None    Years of education: None    Highest education level: None   Occupational History    None   Social Needs    Financial resource strain: None    Food insecurity     Worry: None     Inability: None    Transportation needs     Medical: None     Non-medical: None   Tobacco Use    Smoking status: Never Smoker    Smokeless tobacco: Never Used   Substance and Sexual Activity    Alcohol use: No    Drug use: No    Sexual activity: Not Currently     Partners: Female   Lifestyle    Physical activity     Days per week: None     Minutes per session: None    Stress: None   Relationships    Social connections     Talks on phone: None     Gets together: None     Attends [employer] service: None     Active member of club or organization: None     Attends meetings of clubs or organizations: None     Relationship status: None    Intimate partner violence     Fear of current or ex partner: None     Emotionally abused: None     Physically abused: None     Forced sexual activity: None   Other Topics Concern    None   Social History Narrative    None     Family History   Problem Relation Age of Onset    Cancer Mother     No Known Problems Father         strange to father    Cancer Sister        PHYSICAL EXAM    VITAL SIGNS: /71   Pulse 91   Temp 99.1 °F (37.3 °C) (Oral)   Resp 18   Ht 5' 8\" (1.727 m)   Wt 198 lb (89.8 kg)   SpO2 92%   BMI 30.11 kg/m²    Constitutional:  Well developed, well nourished, no acute distress   Scalp: no scalp hematoma, no palpable skull fractures  Neck: no posterior midline neck tenderness, no JVD   Eyes: Pupils equally round and react to light, extraocular movement are

## 2020-03-26 ENCOUNTER — CARE COORDINATION (OUTPATIENT)
Dept: CASE MANAGEMENT | Age: 65
End: 2020-03-26

## 2020-06-01 ENCOUNTER — APPOINTMENT (OUTPATIENT)
Dept: GENERAL RADIOLOGY | Age: 65
End: 2020-06-01
Payer: MEDICARE

## 2020-06-01 ENCOUNTER — APPOINTMENT (OUTPATIENT)
Dept: CT IMAGING | Age: 65
End: 2020-06-01
Payer: MEDICARE

## 2020-06-01 ENCOUNTER — HOSPITAL ENCOUNTER (EMERGENCY)
Age: 65
Discharge: HOME | End: 2020-06-01
Attending: EMERGENCY MEDICINE
Payer: MEDICARE

## 2020-06-01 VITALS
RESPIRATION RATE: 27 BRPM | HEART RATE: 90 BPM | TEMPERATURE: 98.5 F | DIASTOLIC BLOOD PRESSURE: 82 MMHG | HEIGHT: 72 IN | OXYGEN SATURATION: 94 % | WEIGHT: 200 LBS | SYSTOLIC BLOOD PRESSURE: 151 MMHG | BODY MASS INDEX: 27.09 KG/M2

## 2020-06-01 LAB
ALBUMIN SERPL-MCNC: 4.2 GM/DL (ref 3.4–5)
ALP BLD-CCNC: 39 IU/L (ref 40–129)
ALT SERPL-CCNC: 18 U/L (ref 10–40)
ANION GAP SERPL CALCULATED.3IONS-SCNC: 6 MMOL/L (ref 4–16)
AST SERPL-CCNC: 16 IU/L (ref 15–37)
BACTERIA: ABNORMAL /HPF
BASOPHILS ABSOLUTE: 0 K/CU MM
BASOPHILS RELATIVE PERCENT: 0.2 % (ref 0–1)
BILIRUB SERPL-MCNC: 0.6 MG/DL (ref 0–1)
BILIRUBIN URINE: NEGATIVE MG/DL
BLOOD, URINE: ABNORMAL
BUN BLDV-MCNC: 10 MG/DL (ref 6–23)
CALCIUM SERPL-MCNC: 10 MG/DL (ref 8.3–10.6)
CAST TYPE: ABNORMAL /HPF
CHLORIDE BLD-SCNC: 97 MMOL/L (ref 99–110)
CHP ED QC CHECK: YES
CLARITY: ABNORMAL
CO2: 36 MMOL/L (ref 21–32)
COLOR: YELLOW
CREAT SERPL-MCNC: 0.9 MG/DL (ref 0.9–1.3)
CRYSTAL TYPE: NEGATIVE /HPF
DIFFERENTIAL TYPE: ABNORMAL
EOSINOPHILS ABSOLUTE: 0 K/CU MM
EOSINOPHILS RELATIVE PERCENT: 0.2 % (ref 0–3)
EPITHELIAL CELLS, UA: 1 /HPF
GFR AFRICAN AMERICAN: >60 ML/MIN/1.73M2
GFR NON-AFRICAN AMERICAN: >60 ML/MIN/1.73M2
GLUCOSE BLD-MCNC: 109 MG/DL
GLUCOSE BLD-MCNC: 109 MG/DL (ref 70–99)
GLUCOSE BLD-MCNC: 117 MG/DL (ref 70–99)
GLUCOSE, URINE: NEGATIVE MG/DL
HCT VFR BLD CALC: 47.6 % (ref 42–52)
HEMOGLOBIN: 15.6 GM/DL (ref 13.5–18)
IMMATURE NEUTROPHIL %: 0.2 % (ref 0–0.43)
KETONES, URINE: NEGATIVE MG/DL
LEUKOCYTE ESTERASE, URINE: ABNORMAL
LIPASE: 63 IU/L (ref 13–60)
LYMPHOCYTES ABSOLUTE: 1.2 K/CU MM
LYMPHOCYTES RELATIVE PERCENT: 21.1 % (ref 24–44)
MCH RBC QN AUTO: 30.7 PG (ref 27–31)
MCHC RBC AUTO-ENTMCNC: 32.8 % (ref 32–36)
MCV RBC AUTO: 93.7 FL (ref 78–100)
MONOCYTES ABSOLUTE: 0.6 K/CU MM
MONOCYTES RELATIVE PERCENT: 10.3 % (ref 0–4)
NITRITE URINE, QUANTITATIVE: NEGATIVE
PDW BLD-RTO: 12.7 % (ref 11.7–14.9)
PH, URINE: 6.5 (ref 5–8)
PLATELET # BLD: 240 K/CU MM (ref 140–440)
PMV BLD AUTO: 9.6 FL (ref 7.5–11.1)
POTASSIUM SERPL-SCNC: 4.6 MMOL/L (ref 3.5–5.1)
PROTEIN UA: NEGATIVE MG/DL
RBC # BLD: 5.08 M/CU MM (ref 4.6–6.2)
RBC URINE: 3 /HPF (ref 0–3)
SEGMENTED NEUTROPHILS ABSOLUTE COUNT: 4 K/CU MM
SEGMENTED NEUTROPHILS RELATIVE PERCENT: 68 % (ref 36–66)
SODIUM BLD-SCNC: 139 MMOL/L (ref 135–145)
SPECIFIC GRAVITY UA: 1.01 (ref 1–1.03)
TOTAL IMMATURE NEUTOROPHIL: 0.01 K/CU MM
TOTAL PROTEIN: 6.8 GM/DL (ref 6.4–8.2)
TROPONIN T: <0.01 NG/ML
UROBILINOGEN, URINE: 1 MG/DL (ref 0.2–1)
WBC # BLD: 5.8 K/CU MM (ref 4–10.5)
WBC UA: 10 /HPF (ref 0–2)

## 2020-06-01 PROCEDURE — 71045 X-RAY EXAM CHEST 1 VIEW: CPT

## 2020-06-01 PROCEDURE — 85025 COMPLETE CBC W/AUTO DIFF WBC: CPT

## 2020-06-01 PROCEDURE — 87086 URINE CULTURE/COLONY COUNT: CPT

## 2020-06-01 PROCEDURE — 84484 ASSAY OF TROPONIN QUANT: CPT

## 2020-06-01 PROCEDURE — 70450 CT HEAD/BRAIN W/O DYE: CPT

## 2020-06-01 PROCEDURE — 36415 COLL VENOUS BLD VENIPUNCTURE: CPT

## 2020-06-01 PROCEDURE — 99285 EMERGENCY DEPT VISIT HI MDM: CPT

## 2020-06-01 PROCEDURE — 83690 ASSAY OF LIPASE: CPT

## 2020-06-01 PROCEDURE — 87186 SC STD MICRODIL/AGAR DIL: CPT

## 2020-06-01 PROCEDURE — 81001 URINALYSIS AUTO W/SCOPE: CPT

## 2020-06-01 PROCEDURE — 93010 ELECTROCARDIOGRAM REPORT: CPT | Performed by: INTERNAL MEDICINE

## 2020-06-01 PROCEDURE — 82962 GLUCOSE BLOOD TEST: CPT

## 2020-06-01 PROCEDURE — 93005 ELECTROCARDIOGRAM TRACING: CPT | Performed by: EMERGENCY MEDICINE

## 2020-06-01 PROCEDURE — 80053 COMPREHEN METABOLIC PANEL: CPT

## 2020-06-01 PROCEDURE — 87088 URINE BACTERIA CULTURE: CPT

## 2020-06-01 RX ORDER — CEPHALEXIN 500 MG/1
500 CAPSULE ORAL 2 TIMES DAILY
Qty: 14 CAPSULE | Refills: 0 | Status: SHIPPED | OUTPATIENT
Start: 2020-06-01 | End: 2020-06-08

## 2020-06-01 NOTE — ED NOTES
Pt incontinent of urine, pericare given, pt entire buttock discolored,dark red in color. Dr. Calderon Doctor in room to assess skin, pt does not appear painful when touching and no skin breakdown.        Renée Ospina RN  06/01/20 1474

## 2020-06-01 NOTE — ED PROVIDER NOTES
eMERGENCY dEPARTMENT eNCOUnter      PCP: Dayan Pereira MD    CHIEF COMPLAINT    Chief Complaint   Patient presents with    Altered Mental Status     Pt arrives ambulatory, nurse at [facility] re[facility] state pt was found on floor this at approx 0745, unwitnessed and staff felt pt may have had a seizure d/t seizure disorder. Pt arrives with tongue out of mouth, occasionally rolling from side to side in bed, ems states staff report not his normal behavior. Pt asked about pain and 4 times reported pain in different locations. HPI    Erick Russo is a 63 y.o. male who presents with altered mental status. Patient tells me that he had a seizure this morning. He states he cannot remember the details of it but he thinks that he had a seizure. He does report history of seizure disorder. He tells me he is compliant with his medications. He is a poor historian otherwise, thus history from him is difficult to obtain. When asked where he has pain he points to his ears, he also told medics it was his abdomen, also told the nurse it was his arm. It was reported from nursing staff that he was found sitting on the floor by his bed. REVIEW OF SYSTEMS    Unable to obtain as the patient is a poor historian.     All other review of systems are negative  See HPI and nursing notes for additional information     PAST MEDICAL AND SURGICAL HISTORY    Past Medical History:   Diagnosis Date    Avascular necrosis of bone ([address]     right hip    CAD (coronary artery disease)     Coronary atherosclerosis     Dementia     Dementia ([address]     Dizziness     cerebelar atrophy    Environmental allergies     Essential hypertension     H/O Doppler ultrasound 07/11/2016    carotid - normal    IBS (irritable bowel syndrome)     Mental disability     Recurrent falls     S/P PTCA (percutaneous transluminal coronary angioplasty) 06/16/2016    Cx OM stent    Seizure disorder ([address]     Urge incontinence of urine  Wrist joint pain     chronic left     Past Surgical History:   Procedure Laterality Date    CARDIAC SURGERY      JOINT REPLACEMENT      hip replacement left    ORIF FEMUR DECOMPRESSION  06/2007    left     PTCA  06/16/2016    Cx OM stent       CURRENT MEDICATIONS    Current Outpatient Rx   Medication Sig Dispense Refill    divalproex (DEPAKOTE ER) 500 MG extended release tablet Take 1 tablet by mouth nightly 30 tablet 1    lamoTRIgine (LAMICTAL) 150 MG tablet Take 1 tablet by mouth daily 30 tablet 1    lacosamide (VIMPAT) 200 MG tablet Take 1 tablet by mouth 2 times daily.  60 tablet 0    DULoxetine (CYMBALTA) 30 MG extended release capsule Take 3 capsules by mouth daily 90 capsule 1    traZODone (DESYREL) 50 MG tablet Take 1 tablet by mouth nightly as needed for Sleep 30 tablet 1    benztropine (COGENTIN) 1 MG tablet Take 1 tablet by mouth 2 times daily 60 tablet 0    ziprasidone (GEODON) 20 MG capsule Take 1 capsule by mouth daily (with breakfast) 30 capsule 1    ziprasidone (GEODON) 60 MG capsule Take 1 capsule by mouth nightly 30 capsule 1    metoprolol succinate (TOPROL XL) 50 MG extended release tablet Take 1 tablet by mouth daily 30 tablet 0    amLODIPine (NORVASC) 5 MG tablet Take 1 tablet by mouth daily 30 tablet 0    tamsulosin (FLOMAX) 0.4 MG capsule Take 1 capsule by mouth daily 30 capsule 0    vitamin D (ERGOCALCIFEROL) 1.25 MG (30903 UT) CAPS capsule Take 1 capsule by mouth once a week 5 capsule 0    lamoTRIgine (LAMICTAL) 100 MG tablet Take 1 tablet by mouth nightly 30 tablet 1    levothyroxine (SYNTHROID) 75 MCG tablet Take 1 tablet by mouth Daily 30 tablet 3    melatonin 3 MG TABS tablet Take 3 mg by mouth daily      lisinopril (PRINIVIL;ZESTRIL) 10 MG tablet Take 1 tablet by mouth daily 30 tablet 0    aspirin-dipyridamole (AGGRENOX)  MG per extended release capsule Take 1 capsule by mouth nightly       metFORMIN (GLUCOPHAGE) 500 MG tablet Take 2 tablets by mouth 2 without acute focal process. There is no effusion or pneumothorax. The cardiomediastinal silhouette is stable. The osseous structures are stable. No acute process. ED COURSE & MEDICAL DECISION MAKING       Vital signs and nursing notes reviewed during ED course. All pertinent Lab data and radiographic results reviewed with patient at bedside. The patient and/or the family were informed of the results of any tests/labs/imaging, the treatment plan, and time was allotted to answer questions. This is a 26-year-old male with mental disability, schizophrenia, seizure disorder who presented from his nursing facility due to altered mental status. He reported to me that he had a seizure this morning. On evaluation he was able to provide history, but did seem like a poor historian. Work-up was initiated. Labs are reassuring, urinalysis does show evidence of UTI, nursing facility was concerned about the possibility of a UTI. We will treat this with antibiotics. A slightly elevated, this appears to be slightly elevated on multiple other occasions. Troponin negative. Chest x-ray nonacute, CT scan of the head without acute findings. Patient is awake and alert, answering questions, no focal deficits. I do feel that he can be discharged back to nursing facility at this point with antibiotics for UTI, continue observation and care with instructions to return with new or worsening signs or symptoms.       Clinical  IMPRESSION    UTI, seizure disorder          (Please note the MDM and HPI sections of this note were completed with a voice recognition program.  Efforts were made to edit the dictations but occasionally words are mis-transcribed.)     Ambika Lal DO  06/01/20 [id number]

## 2020-06-05 LAB
CULTURE: ABNORMAL
Lab: ABNORMAL
SPECIMEN: ABNORMAL
TOTAL COLONY COUNT: ABNORMAL

## 2020-06-10 LAB
EKG ATRIAL RATE: 82 BPM
EKG DIAGNOSIS: NORMAL
EKG P AXIS: 68 DEGREES
EKG P-R INTERVAL: 176 MS
EKG Q-T INTERVAL: 354 MS
EKG QRS DURATION: 78 MS
EKG QTC CALCULATION (BAZETT): 413 MS
EKG R AXIS: -15 DEGREES
EKG T AXIS: 54 DEGREES
EKG VENTRICULAR RATE: 82 BPM

## 2023-07-21 ENCOUNTER — OFFICE VISIT (OUTPATIENT)
Dept: CARDIOLOGY CLINIC | Age: 68
End: 2023-07-21
Payer: MEDICARE

## 2023-07-21 ENCOUNTER — NURSE ONLY (OUTPATIENT)
Dept: CARDIOLOGY CLINIC | Age: 68
End: 2023-07-21

## 2023-07-21 VITALS
HEIGHT: 72 IN | SYSTOLIC BLOOD PRESSURE: 122 MMHG | HEART RATE: 67 BPM | DIASTOLIC BLOOD PRESSURE: 70 MMHG | WEIGHT: 215 LBS | BODY MASS INDEX: 29.12 KG/M2 | OXYGEN SATURATION: 95 %

## 2023-07-21 DIAGNOSIS — R42 DIZZINESS AND GIDDINESS: ICD-10-CM

## 2023-07-21 DIAGNOSIS — R94.31 ABNORMAL FINDING ON EKG: Primary | ICD-10-CM

## 2023-07-21 DIAGNOSIS — R06.02 SHORTNESS OF BREATH: ICD-10-CM

## 2023-07-21 PROCEDURE — 1036F TOBACCO NON-USER: CPT | Performed by: INTERNAL MEDICINE

## 2023-07-21 PROCEDURE — G8419 CALC BMI OUT NRM PARAM NOF/U: HCPCS | Performed by: INTERNAL MEDICINE

## 2023-07-21 PROCEDURE — 3074F SYST BP LT 130 MM HG: CPT | Performed by: INTERNAL MEDICINE

## 2023-07-21 PROCEDURE — 99204 OFFICE O/P NEW MOD 45 MIN: CPT | Performed by: INTERNAL MEDICINE

## 2023-07-21 PROCEDURE — 93000 ELECTROCARDIOGRAM COMPLETE: CPT | Performed by: INTERNAL MEDICINE

## 2023-07-21 PROCEDURE — 3078F DIAST BP <80 MM HG: CPT | Performed by: INTERNAL MEDICINE

## 2023-07-21 PROCEDURE — 1123F ACP DISCUSS/DSCN MKR DOCD: CPT | Performed by: INTERNAL MEDICINE

## 2023-07-21 PROCEDURE — G8428 CUR MEDS NOT DOCUMENT: HCPCS | Performed by: INTERNAL MEDICINE

## 2023-07-21 PROCEDURE — 3017F COLORECTAL CA SCREEN DOC REV: CPT | Performed by: INTERNAL MEDICINE

## 2023-07-21 RX ORDER — OLANZAPINE 5 MG/1
5 TABLET ORAL NIGHTLY
COMMUNITY

## 2023-07-21 RX ORDER — FLUOXETINE HYDROCHLORIDE 20 MG/1
20 CAPSULE ORAL DAILY
COMMUNITY

## 2023-07-21 RX ORDER — ACETAMINOPHEN 325 MG/1
650 TABLET ORAL EVERY 6 HOURS PRN
COMMUNITY

## 2023-07-21 RX ORDER — LACOSAMIDE 10 MG/ML
50 INJECTION, SOLUTION INTRAVENOUS 2 TIMES DAILY
COMMUNITY

## 2023-07-21 NOTE — PROGRESS NOTES
PT/INR:  No results found for: PTINR  Lab Results   Component Value Date    LABA1C 7.8 (H) 12/18/2019    LABA1C 7.8 10/18/2019     Lab Results   Component Value Date    CHOL 100 02/07/2020    TRIG 66 02/07/2020    HDL 41 02/07/2020    LDLCALC 116 (H) 04/04/2014    LDLDIRECT 53 02/07/2020     Lab Results   Component Value Date    WBC 5.8 06/01/2020    HGB 15.6 06/01/2020    HCT 47.6 06/01/2020    MCV 93.7 06/01/2020     06/01/2020     TSH: No results found for: TSH  Lab Results   Component Value Date    AST 16 06/01/2020    ALT 18 06/01/2020    BILIDIR 0.2 12/18/2019    BILITOT 0.6 06/01/2020    ALKPHOS 39 (L) 06/01/2020         All labs, medications and tests reviewed by myself including data and history from outside source , patient and available family . 1. Abnormal finding on EKG    2. Dizziness and giddiness    3. Shortness of breath         Impression and Plan:      ?  Abnormal EKG: EKG obtained in office today shows sinus rhythm without any  abnormality, nonspecific ST changes  Shortness of breath at the nursing facility  Dizziness reported per staff  Essential hypertension  Diabetes mellitus    Obtain 48-hour Holter monitor to rule out arrhythmias  Echocardiogram to rule out structural heart disease  Carotid ultrasound    Continue with metoprolol, lisinopril, statins  Patient is also on psychiatric and neurological, antiseizure medication, recommend outpatient follow-up with psychiatry and neurology        Return in about 1 month (around 8/21/2023).

## 2023-07-28 ENCOUNTER — HOSPITAL ENCOUNTER (OUTPATIENT)
Dept: MRI IMAGING | Age: 68
Discharge: HOME | End: 2023-07-28

## 2023-07-28 DIAGNOSIS — R27.0 ATAXIA: ICD-10-CM

## 2023-07-28 DIAGNOSIS — Z13.89: ICD-10-CM

## 2023-08-10 ENCOUNTER — PROCEDURE VISIT (OUTPATIENT)
Dept: CARDIOLOGY CLINIC | Age: 68
End: 2023-08-10
Payer: MEDICARE

## 2023-08-10 DIAGNOSIS — R94.31 ABNORMAL FINDING ON EKG: ICD-10-CM

## 2023-08-10 DIAGNOSIS — R06.02 SHORTNESS OF BREATH: ICD-10-CM

## 2023-08-10 DIAGNOSIS — R42 DIZZINESS AND GIDDINESS: ICD-10-CM

## 2023-08-10 LAB
LV EF: 58 %
LVEF MODALITY: NORMAL

## 2023-08-10 PROCEDURE — 93306 TTE W/DOPPLER COMPLETE: CPT | Performed by: INTERNAL MEDICINE

## 2023-08-10 PROCEDURE — 93880 EXTRACRANIAL BILAT STUDY: CPT | Performed by: INTERNAL MEDICINE

## 2023-08-14 ENCOUNTER — TELEPHONE (OUTPATIENT)
Dept: CARDIOLOGY CLINIC | Age: 68
End: 2023-08-14

## 2023-08-14 NOTE — TELEPHONE ENCOUNTER
Attempted calling patient to give him the results of his recent testing. He is in a NH and I called and they would transfer me to nurse and no one would answer.

## 2023-08-24 ENCOUNTER — TELEPHONE (OUTPATIENT)
Dept: CARDIOLOGY CLINIC | Age: 68
End: 2023-08-24

## 2023-08-24 NOTE — TELEPHONE ENCOUNTER
Called facility and spoke with patients nurse regarding Holter results. 48-hour Holter monitor     diagnostic time 13 hours 48-minute, artifacts 1 day 10 hours  Indication dizziness  Minimum heart rate 51 bpm  Average heart rate 66 bpm  Maximum heart rate 91 bpm  No atrial fibrillation  No arrhythmias noted  PVCs 255  SVTs 11     Conclusion:     Normal sinus rhythm  No atrial fibrillation  Normal study. Will follow up in office.

## 2023-09-08 ENCOUNTER — OFFICE VISIT (OUTPATIENT)
Dept: CARDIOLOGY CLINIC | Age: 68
End: 2023-09-08
Payer: MEDICARE

## 2023-09-08 VITALS
HEIGHT: 72 IN | SYSTOLIC BLOOD PRESSURE: 118 MMHG | DIASTOLIC BLOOD PRESSURE: 64 MMHG | HEART RATE: 62 BPM | BODY MASS INDEX: 29.12 KG/M2 | WEIGHT: 215 LBS

## 2023-09-08 DIAGNOSIS — R42 DIZZINESS AND GIDDINESS: ICD-10-CM

## 2023-09-08 DIAGNOSIS — R06.02 SHORTNESS OF BREATH: Primary | ICD-10-CM

## 2023-09-08 DIAGNOSIS — R94.31 ABNORMAL EKG: ICD-10-CM

## 2023-09-08 DIAGNOSIS — I10 ESSENTIAL HYPERTENSION: ICD-10-CM

## 2023-09-08 PROCEDURE — 3074F SYST BP LT 130 MM HG: CPT | Performed by: INTERNAL MEDICINE

## 2023-09-08 PROCEDURE — G8427 DOCREV CUR MEDS BY ELIG CLIN: HCPCS | Performed by: INTERNAL MEDICINE

## 2023-09-08 PROCEDURE — 3078F DIAST BP <80 MM HG: CPT | Performed by: INTERNAL MEDICINE

## 2023-09-08 PROCEDURE — 1036F TOBACCO NON-USER: CPT | Performed by: INTERNAL MEDICINE

## 2023-09-08 PROCEDURE — 3017F COLORECTAL CA SCREEN DOC REV: CPT | Performed by: INTERNAL MEDICINE

## 2023-09-08 PROCEDURE — G8419 CALC BMI OUT NRM PARAM NOF/U: HCPCS | Performed by: INTERNAL MEDICINE

## 2023-09-08 PROCEDURE — 99214 OFFICE O/P EST MOD 30 MIN: CPT | Performed by: INTERNAL MEDICINE

## 2023-09-08 PROCEDURE — 1123F ACP DISCUSS/DSCN MKR DOCD: CPT | Performed by: INTERNAL MEDICINE

## 2023-09-08 NOTE — PROGRESS NOTES
Rosa Francis MD                                  CARDIOLOGY  NOTE           Chief Complaint:    Chief Complaint   Patient presents with    Results     Patient here today to review results of carotid doppler, echo and 48 hour holter monitor. Coronary Artery Disease    Hypertension    Follow-up     No chest pain, , SOB dizziness swelling or palpitations        48-hour Holter monitor     diagnostic time 13 hours 48-minute, artifacts 1 day 10 hours  Indication dizziness  Minimum heart rate 51 bpm  Average heart rate 66 bpm  Maximum heart rate 91 bpm  No atrial fibrillation  No arrhythmias noted  PVCs 255  PACs 11     Conclusion:     Normal sinus rhythm  No atrial fibrillation  Normal study. Echo 8/10/2023     Left ventricular systolic function is normal with an ejection fraction of   55-60%. Mild concentric left ventricular hypertrophy. Essentially normal chamber sizes. No significant valvular disease noted. Normal pulmonary artery pressure. No evidence of pericardial effusion. Prior HPI:     Edis Victoria is a 79y.o. year old -American male with history of slow mental growth, who currently resides in a nursing home is brought to the clinic for abnormal EKG. EKG not available for review. New EKG was obtained in the office today which shows sinus rhythm with nonspecific ST changes  Patient is a poor historian, as per staff from the nursing home he has been feeling dizzy, uses a walker to ambulate. Has occasional shortness of breath. Current Outpatient Medications   Medication Sig Dispense Refill    OLANZapine (ZYPREXA) 5 MG tablet Take 1 tablet by mouth nightly      lacosamide (VIMPAT) 200 MG/20ML SOLN Infuse 5 mLs intravenously 2 times daily.       FLUoxetine (PROZAC) 20 MG capsule Take 1 capsule by mouth daily      divalproex (DEPAKOTE ER) 500 MG extended release tablet Take 1 tablet by mouth nightly (Patient taking differently: Take 1 tablet by mouth nightly 125mg) 30 tablet 1

## 2024-04-25 ENCOUNTER — OFFICE VISIT (OUTPATIENT)
Dept: NEUROLOGY | Age: 69
End: 2024-04-25
Payer: MEDICARE

## 2024-04-25 VITALS
SYSTOLIC BLOOD PRESSURE: 138 MMHG | HEART RATE: 83 BPM | HEIGHT: 72 IN | WEIGHT: 219.6 LBS | DIASTOLIC BLOOD PRESSURE: 80 MMHG | BODY MASS INDEX: 29.74 KG/M2 | OXYGEN SATURATION: 95 %

## 2024-04-25 DIAGNOSIS — G40.909 NONINTRACTABLE EPILEPSY WITHOUT STATUS EPILEPTICUS, UNSPECIFIED EPILEPSY TYPE (HCC): Primary | ICD-10-CM

## 2024-04-25 DIAGNOSIS — G31.9 CEREBELLAR ATROPHY (HCC): ICD-10-CM

## 2024-04-25 PROCEDURE — 1123F ACP DISCUSS/DSCN MKR DOCD: CPT | Performed by: STUDENT IN AN ORGANIZED HEALTH CARE EDUCATION/TRAINING PROGRAM

## 2024-04-25 PROCEDURE — G8427 DOCREV CUR MEDS BY ELIG CLIN: HCPCS | Performed by: STUDENT IN AN ORGANIZED HEALTH CARE EDUCATION/TRAINING PROGRAM

## 2024-04-25 PROCEDURE — G8419 CALC BMI OUT NRM PARAM NOF/U: HCPCS | Performed by: STUDENT IN AN ORGANIZED HEALTH CARE EDUCATION/TRAINING PROGRAM

## 2024-04-25 PROCEDURE — 3079F DIAST BP 80-89 MM HG: CPT | Performed by: STUDENT IN AN ORGANIZED HEALTH CARE EDUCATION/TRAINING PROGRAM

## 2024-04-25 PROCEDURE — 3017F COLORECTAL CA SCREEN DOC REV: CPT | Performed by: STUDENT IN AN ORGANIZED HEALTH CARE EDUCATION/TRAINING PROGRAM

## 2024-04-25 PROCEDURE — 3075F SYST BP GE 130 - 139MM HG: CPT | Performed by: STUDENT IN AN ORGANIZED HEALTH CARE EDUCATION/TRAINING PROGRAM

## 2024-04-25 PROCEDURE — 99205 OFFICE O/P NEW HI 60 MIN: CPT | Performed by: STUDENT IN AN ORGANIZED HEALTH CARE EDUCATION/TRAINING PROGRAM

## 2024-04-25 PROCEDURE — 1036F TOBACCO NON-USER: CPT | Performed by: STUDENT IN AN ORGANIZED HEALTH CARE EDUCATION/TRAINING PROGRAM

## 2024-04-25 NOTE — PROGRESS NOTES
Readings from Last 3 Encounters:   04/25/24 85   12/17/23 64   09/08/23 64          Gen: A&O x person and \"doctors office\", NAD, cooperative  HEENT: NC/AT, EOMI, PERRL, mmm, neck supple, no meningeal signs;    Heart: without central or acrocyanosis  Lungs: Without signs of respiratory distress  Ext: no edema, no calf tenderness b/l  Psych: Calm, pleasant  Skin: no rashes or lesions    NEUROLOGIC EXAM:     Mental Status: A&O to self, \"doctors office\", NAD, speech clear, language fluent, repetition and naming intact, follows simple commands appropriately    Cranial Nerve Exam:   CN II-XII: PERRL, VFF to threat BL, no nystagmus, no gaze paresis, sensation V1-V3 intact b/l, muscles of facial expression symmetric; hearing intact to conversational tone, palate elevates symmetrically, shoulder elevation symmetric and tongue protrudes midline with movement side to side.    Motor Exam:        strength 5/5 BL, knee extension 4/5 BL   No pronator drift    Deep Tendon Reflexes: 1/4 brachioradialis, trace patellar, and absent achilles b/l     Sensation: Intact light touch UE's/LE's b/l    Coordination/Cerebellum:       Tremors--none      Finger-to-Nose: + dysmetria b/l    Gait and stance:      Gait: Ataxic      LABS:        CBC: No results for input(s): \"WBC\", \"RBC\", \"HGB\", \"HCT\", \"PLT\", \"MCV\" in the last 72 hours.  BMP:  No results for input(s): \"NA\", \"K\", \"CL\", \"CO2\", \"BUN\", \"CREATININE\", \"GLUCOSE\", \"CALCIUM\" in the last 72 hours.    IMAGING:    MRI brain without contrast 2020  1. No acute intracranial abnormality. 2. Stable cerebellar atrophy.     CT head without contrast 12/2023  IMPRESSION:  No acute intracranial abnormality.     Stable cerebral and cerebellar atrophy.    Above images and reports personally reviewed in detail.   ASSESSMENT/PLAN:       ICD-10-CM    1. Nonintractable epilepsy without status epilepticus, unspecified epilepsy type (HCC)  G40.909 Comprehensive Metabolic Panel     CBC      2. Cerebellar

## 2024-07-29 ENCOUNTER — TRANSCRIBE ORDERS (OUTPATIENT)
Dept: ADMINISTRATIVE | Age: 69
End: 2024-07-29

## 2024-07-29 DIAGNOSIS — R53.1 WEAKNESS: ICD-10-CM

## 2024-07-29 DIAGNOSIS — G62.9 POLYNEUROPATHY: Primary | ICD-10-CM

## 2024-08-06 ENCOUNTER — HOSPITAL ENCOUNTER (OUTPATIENT)
Dept: MRI IMAGING | Age: 69
Discharge: HOME | End: 2024-08-06
Attending: FAMILY MEDICINE
Payer: MEDICARE

## 2024-08-06 DIAGNOSIS — R53.1 WEAKNESS: ICD-10-CM

## 2024-08-06 DIAGNOSIS — G62.9 POLYNEUROPATHY: ICD-10-CM

## 2024-08-06 PROCEDURE — 72141 MRI NECK SPINE W/O DYE: CPT

## 2024-08-06 PROCEDURE — 70551 MRI BRAIN STEM W/O DYE: CPT

## 2024-12-05 LAB — LAMOTRIGINE: 7.3

## 2025-03-21 ENCOUNTER — TELEPHONE (OUTPATIENT)
Age: 70
End: 2025-03-21

## 2025-03-21 NOTE — TELEPHONE ENCOUNTER
Called and spoke with Anel from [facility], who was advised patient will need to get labs done before his upcoming appointment. Anel states she andreina the same labs last week and will be faxing in. I gave her our fax number.

## 2025-04-02 ENCOUNTER — OFFICE VISIT (OUTPATIENT)
Age: 70
End: 2025-04-02
Payer: MEDICARE

## 2025-04-02 VITALS — OXYGEN SATURATION: 96 % | SYSTOLIC BLOOD PRESSURE: 112 MMHG | DIASTOLIC BLOOD PRESSURE: 62 MMHG | HEART RATE: 70 BPM

## 2025-04-02 DIAGNOSIS — G31.9 CEREBELLAR ATROPHY: ICD-10-CM

## 2025-04-02 DIAGNOSIS — G40.909 NONINTRACTABLE EPILEPSY WITHOUT STATUS EPILEPTICUS, UNSPECIFIED EPILEPSY TYPE (HCC): Primary | ICD-10-CM

## 2025-04-02 PROCEDURE — 3017F COLORECTAL CA SCREEN DOC REV: CPT | Performed by: NURSE PRACTITIONER

## 2025-04-02 PROCEDURE — 3078F DIAST BP <80 MM HG: CPT | Performed by: NURSE PRACTITIONER

## 2025-04-02 PROCEDURE — 99214 OFFICE O/P EST MOD 30 MIN: CPT | Performed by: NURSE PRACTITIONER

## 2025-04-02 PROCEDURE — 1036F TOBACCO NON-USER: CPT | Performed by: NURSE PRACTITIONER

## 2025-04-02 PROCEDURE — 1159F MED LIST DOCD IN RCRD: CPT | Performed by: NURSE PRACTITIONER

## 2025-04-02 PROCEDURE — 99213 OFFICE O/P EST LOW 20 MIN: CPT | Performed by: NURSE PRACTITIONER

## 2025-04-02 PROCEDURE — G8427 DOCREV CUR MEDS BY ELIG CLIN: HCPCS | Performed by: NURSE PRACTITIONER

## 2025-04-02 PROCEDURE — G8419 CALC BMI OUT NRM PARAM NOF/U: HCPCS | Performed by: NURSE PRACTITIONER

## 2025-04-02 PROCEDURE — 1123F ACP DISCUSS/DSCN MKR DOCD: CPT | Performed by: NURSE PRACTITIONER

## 2025-04-02 PROCEDURE — 3074F SYST BP LT 130 MM HG: CPT | Performed by: NURSE PRACTITIONER

## 2025-04-02 RX ORDER — SODIUM PHOSPHATE, DIBASIC AND SODIUM PHOSPHATE, MONOBASIC 3.5; 9.5 G/66ML; G/66ML
1 ENEMA RECTAL ONCE
COMMUNITY

## 2025-04-02 RX ORDER — MULTIVIT-MIN/FERROUS GLUCONATE 9 MG/15 ML
15 LIQUID (ML) ORAL DAILY
COMMUNITY

## 2025-04-02 RX ORDER — FLUOXETINE 10 MG/1
10 CAPSULE ORAL DAILY
COMMUNITY

## 2025-04-02 RX ORDER — BISACODYL 10 MG
10 SUPPOSITORY, RECTAL RECTAL DAILY
COMMUNITY

## 2025-04-02 RX ORDER — INSULIN GLARGINE 100 [IU]/ML
INJECTION, SOLUTION SUBCUTANEOUS NIGHTLY
COMMUNITY

## 2025-04-02 NOTE — PROGRESS NOTES
4/2/25    Elroy Figueroa  1955    Chief Complaint   Patient presents with    Follow-up     6m follow up for Nonintractable epilepsy without status epilepticus, unspecified epilepsy type.       History of Present Illness  Elroy is a 69 y.o. male presenting today for follow-up of: Cerebellar ataxia, seizure    He resides at [facility]. He is accompanied by transportation during the visit but she is unsure if his medical history so she called his nurse on the phone during the visit.     Per his nurse Holly, Last seizure was not recent.  She has witnessed 1 long time ago-she reports he goes unresponsive but she can't remember the details.     On his medication list, he has Vimpat 200 mg twice daily, Depakote 250 mg ER in the morning and lamotrigine 150 mg at bedtime.    Holly reported to his most recent valproic acid level was on 2/21/25 and the level was 22, lamotrigine level 6.5.  There was no lacosamide level.    CBC/CMP was ordered last visit by Dr. Pathak, LFTs were slightly elevated.  Kidney function was okay.  CBC normal on 3/17/25.    He has cerebellar ataxia and he is in a wheelchair today but he is very active at [facility].      Current Outpatient Medications   Medication Sig Dispense Refill    bisacodyl (DULCOLAX) 10 MG suppository Place 1 suppository rectally daily      sodium phosphate (FLEET) 3.5-9.5 GM/59ML enema Place 1 enema rectally once      glucagon 1 MG injection Inject 1 mg into the muscle once      insulin glargine (LANTUS) 100 UNIT/ML injection vial Inject into the skin nightly (Patient taking differently: Inject 5 Units into the skin nightly)      magnesium hydroxide (MILK OF MAGNESIA) 400 MG/5ML suspension Take 30 mLs by mouth daily as needed for Constipation      Multiple Vitamins-Minerals (CENTRUM/CERTA-MORIAH WITH MINERALS ORAL) solution Take 15 mLs by mouth daily      FLUoxetine (PROZAC) 10 MG capsule Take 1 capsule by mouth daily      OLANZapine (ZYPREXA) 5 MG